# Patient Record
Sex: FEMALE | NOT HISPANIC OR LATINO | Employment: OTHER | URBAN - METROPOLITAN AREA
[De-identification: names, ages, dates, MRNs, and addresses within clinical notes are randomized per-mention and may not be internally consistent; named-entity substitution may affect disease eponyms.]

---

## 2021-02-13 ENCOUNTER — IMMUNIZATION (OUTPATIENT)
Dept: VACCINE CLINIC | Facility: HOSPITAL | Age: 81
End: 2021-02-13

## 2021-02-13 PROCEDURE — 91300 HC SARSCOV02 VAC 30MCG/0.3ML IM: CPT | Performed by: INTERNAL MEDICINE

## 2021-02-13 PROCEDURE — 0001A: CPT | Performed by: INTERNAL MEDICINE

## 2021-03-06 ENCOUNTER — IMMUNIZATION (OUTPATIENT)
Dept: VACCINE CLINIC | Facility: HOSPITAL | Age: 81
End: 2021-03-06

## 2021-03-06 PROCEDURE — 0002A: CPT | Performed by: INTERNAL MEDICINE

## 2021-03-06 PROCEDURE — 91300 HC SARSCOV02 VAC 30MCG/0.3ML IM: CPT | Performed by: INTERNAL MEDICINE

## 2023-10-05 ENCOUNTER — OFFICE VISIT (OUTPATIENT)
Dept: NEUROSURGERY | Facility: CLINIC | Age: 83
End: 2023-10-05
Payer: MEDICARE

## 2023-10-05 VITALS
TEMPERATURE: 96.6 F | HEIGHT: 61 IN | DIASTOLIC BLOOD PRESSURE: 82 MMHG | OXYGEN SATURATION: 95 % | WEIGHT: 164 LBS | SYSTOLIC BLOOD PRESSURE: 135 MMHG | BODY MASS INDEX: 30.96 KG/M2 | HEART RATE: 64 BPM

## 2023-10-05 DIAGNOSIS — M48.062 SPINAL STENOSIS OF LUMBAR REGION WITH NEUROGENIC CLAUDICATION: Primary | ICD-10-CM

## 2023-10-05 DIAGNOSIS — M43.16 SPONDYLOLISTHESIS OF LUMBAR REGION: ICD-10-CM

## 2023-10-05 PROBLEM — M48.061 LUMBAR SPINAL STENOSIS: Status: ACTIVE | Noted: 2023-10-05

## 2023-10-05 PROCEDURE — 1160F RVW MEDS BY RX/DR IN RCRD: CPT | Performed by: NEUROLOGICAL SURGERY

## 2023-10-05 PROCEDURE — 99204 OFFICE O/P NEW MOD 45 MIN: CPT | Performed by: NEUROLOGICAL SURGERY

## 2023-10-05 PROCEDURE — 1159F MED LIST DOCD IN RCRD: CPT | Performed by: NEUROLOGICAL SURGERY

## 2023-10-05 RX ORDER — ACETAMINOPHEN 500 MG
500 TABLET ORAL
COMMUNITY

## 2023-10-05 RX ORDER — FOLIC ACID 1 MG/1
TABLET ORAL
COMMUNITY

## 2023-10-05 RX ORDER — SIMVASTATIN 40 MG
1 TABLET ORAL DAILY
COMMUNITY

## 2023-10-05 RX ORDER — ALPRAZOLAM 0.5 MG/1
TABLET ORAL
COMMUNITY

## 2023-10-05 RX ORDER — ESCITALOPRAM OXALATE 10 MG/1
1 TABLET ORAL DAILY
COMMUNITY

## 2023-10-05 RX ORDER — METOPROLOL TARTRATE AND HYDROCHLOROTHIAZIDE 100; 25 MG/1; MG/1
1 TABLET ORAL DAILY
COMMUNITY

## 2023-10-05 RX ORDER — ASPIRIN 81 MG/1
TABLET ORAL
COMMUNITY

## 2023-10-05 RX ORDER — AMOXICILLIN 500 MG
CAPSULE ORAL
COMMUNITY

## 2023-10-05 NOTE — PROGRESS NOTES
"Subjective   Patient ID: Kristyn Lugo is a 83 y.o. female is being seen for consultation today at the request of Sean Fisher MD for spinal stenosis lumbar region. Patient had a MRI L-spine on 08/24/2023 @ [facility]    Treatment: No recent treatment    Today patient states that she has low back pain that radiates to B/L legs, along with numbness in B/L Feet. Patient states that she has issues issues with standing    History of Present Illness    This patient has been having pain in her back with radiation down her legs and numbness and tingling particularly in her feet for about 5 years now.  The problem has been getting steadily worse.  She was told 5 years ago that she would be a candidate for surgery but did not have time for it at that time.  Subsequent to that she has gotten some numbness and tingling particularly in her feet.  She has no difficulty with bowel bladder control but she does have difficulty standing and walking.    The following portions of the patient's history were reviewed and updated as appropriate: allergies, current medications, past family history, past medical history, past social history, past surgical history, and problem list.    Review of Systems   Constitutional:  Negative for chills and fever.   HENT:  Negative for congestion.    Genitourinary:  Negative for difficulty urinating and dysuria.   Musculoskeletal:  Positive for back pain, gait problem and myalgias.   Neurological:  Positive for numbness. Negative for weakness.     I reviewed the review of systems listed by the patient and discussed by my MA    Objective     Vitals:    10/05/23 1505   BP: 135/82   Cuff Size: Adult   Pulse: 64   Temp: 96.6 °F (35.9 °C)   SpO2: 95%   Weight: 74.4 kg (164 lb)   Height: 154.9 cm (61\")     Body mass index is 30.99 kg/m².    Tobacco Use: Medium Risk    Smoking Tobacco Use: Former    Smokeless Tobacco Use: Never    Passive Exposure: Not on file          Physical " Exam  Constitutional:       Appearance: She is well-developed.   HENT:      Head: Normocephalic and atraumatic.   Eyes:      Extraocular Movements: EOM normal.      Conjunctiva/sclera: Conjunctivae normal.      Pupils: Pupils are equal, round, and reactive to light.   Neck:      Vascular: No carotid bruit.   Neurological:      Mental Status: She is oriented to person, place, and time.      Coordination: Finger-Nose-Finger Test and Heel to Shin Test normal.      Gait: Gait is intact.      Deep Tendon Reflexes:      Reflex Scores:       Tricep reflexes are 2+ on the right side and 2+ on the left side.       Bicep reflexes are 2+ on the right side and 2+ on the left side.       Brachioradialis reflexes are 2+ on the right side and 2+ on the left side.       Patellar reflexes are 2+ on the right side and 2+ on the left side.       Achilles reflexes are 2+ on the right side and 2+ on the left side.  Psychiatric:         Speech: Speech normal.     Neurologic Exam     Mental Status   Oriented to person, place, and time.   Registration of memory: Good recent and remote memory.   Attention: normal. Concentration: normal.   Speech: speech is normal   Level of consciousness: alert  Knowledge: consistent with education.     Cranial Nerves     CN II   Visual fields full to confrontation.   Visual acuity: normal    CN III, IV, VI   Pupils are equal, round, and reactive to light.  Extraocular motions are normal.     CN V   Facial sensation intact.   Right corneal reflex: normal  Left corneal reflex: normal    CN VII   Facial expression full, symmetric.   Right facial weakness: none  Left facial weakness: none    CN VIII   Hearing: intact    CN IX, X   Palate: symmetric    CN XI   Right sternocleidomastoid strength: normal  Left sternocleidomastoid strength: normal    CN XII   Tongue: not atrophic  Tongue deviation: none    Motor Exam   Muscle bulk: normal  Right arm tone: normal  Left arm tone: normal  Right leg tone: normal  Left  leg tone: normal    Strength   Strength 5/5 except as noted.     Sensory Exam   Light touch normal.     Gait, Coordination, and Reflexes     Gait  Gait: normal    Coordination   Finger to nose coordination: normal  Heel to shin coordination: normal    Reflexes   Right brachioradialis: 2+  Left brachioradialis: 2+  Right biceps: 2+  Left biceps: 2+  Right triceps: 2+  Left triceps: 2+  Right patellar: 2+  Left patellar: 2+  Right achilles: 2+  Left achilles: 2+  Right : 2+  Left : 2+        Assessment & Plan   Independent Review of Radiographic Studies:      I personally reviewed the images from the following studies.    I reviewed an MRI of the lumbar spine done in August of this year.  This shows a widely patent canal and neuroforamina at L5-S1.  There is a grade 2 spondylolisthesis at L4-5 with severe stenosis.  L3-4 is fairly open with just a little narrowing centrally.  L2-3 shows some right lateral recess stenosis and foraminal stenosis and some central narrowing but not severe at L1-2 is widely open and the conus ends above the level.    Medical Decision Making:      I told the patient and her daughter that any surgery would be quite a major surgery and she is in her mid 80s and morbidly obese.  Consequently she is not a great candidate for surgery.  I recommended that we try an epidural block just to see if that helps calm things down.  I will have her call the office once that has been completed.    Diagnoses and all orders for this visit:    1. Spinal stenosis of lumbar region with neurogenic claudication (Primary)  -     Epidural Block    2. Spondylolisthesis of lumbar region      Return for Recheck and call after treatment or consultation.

## 2023-10-16 ENCOUNTER — HOSPITAL ENCOUNTER (OUTPATIENT)
Dept: PAIN MEDICINE | Facility: HOSPITAL | Age: 83
Discharge: HOME | End: 2023-10-16
Payer: MEDICARE

## 2023-10-16 ENCOUNTER — HOSPITAL ENCOUNTER (OUTPATIENT)
Dept: GENERAL RADIOLOGY | Facility: HOSPITAL | Age: 83
Discharge: HOME | End: 2023-10-16
Payer: MEDICARE

## 2023-10-16 ENCOUNTER — ANESTHESIA EVENT (OUTPATIENT)
Dept: PAIN MEDICINE | Facility: HOSPITAL | Age: 83
End: 2023-10-16
Payer: MEDICARE

## 2023-10-16 ENCOUNTER — ANESTHESIA (OUTPATIENT)
Dept: PAIN MEDICINE | Facility: HOSPITAL | Age: 83
End: 2023-10-16
Payer: MEDICARE

## 2023-10-16 VITALS
RESPIRATION RATE: 16 BRPM | SYSTOLIC BLOOD PRESSURE: 139 MMHG | WEIGHT: 165 LBS | HEIGHT: 61 IN | OXYGEN SATURATION: 95 % | DIASTOLIC BLOOD PRESSURE: 70 MMHG | BODY MASS INDEX: 31.15 KG/M2 | HEART RATE: 60 BPM

## 2023-10-16 DIAGNOSIS — M48.062 SPINAL STENOSIS OF LUMBAR REGION WITH NEUROGENIC CLAUDICATION: Primary | ICD-10-CM

## 2023-10-16 DIAGNOSIS — R52 PAIN: ICD-10-CM

## 2023-10-16 PROCEDURE — 77003 FLUOROGUIDE FOR SPINE INJECT: CPT

## 2023-10-16 PROCEDURE — 25010000002 MIDAZOLAM PER 1 MG: Performed by: ANESTHESIOLOGY

## 2023-10-16 PROCEDURE — 25010000002 METHYLPREDNISOLONE PER 80 MG: Performed by: ANESTHESIOLOGY

## 2023-10-16 RX ORDER — IOPAMIDOL 408 MG/ML
10 INJECTION, SOLUTION INTRATHECAL
Status: DISCONTINUED | OUTPATIENT
Start: 2023-10-16 | End: 2023-10-17 | Stop reason: HOSPADM

## 2023-10-16 RX ORDER — METHYLPREDNISOLONE ACETATE 80 MG/ML
80 INJECTION, SUSPENSION INTRA-ARTICULAR; INTRALESIONAL; INTRAMUSCULAR; SOFT TISSUE ONCE
Status: COMPLETED | OUTPATIENT
Start: 2023-10-16 | End: 2023-10-16

## 2023-10-16 RX ORDER — LIDOCAINE HYDROCHLORIDE 10 MG/ML
1 INJECTION, SOLUTION INFILTRATION; PERINEURAL ONCE
Status: DISCONTINUED | OUTPATIENT
Start: 2023-10-16 | End: 2023-10-17 | Stop reason: HOSPADM

## 2023-10-16 RX ORDER — SODIUM CHLORIDE 0.9 % (FLUSH) 0.9 %
10 SYRINGE (ML) INJECTION AS NEEDED
Status: DISCONTINUED | OUTPATIENT
Start: 2023-10-16 | End: 2023-10-17 | Stop reason: HOSPADM

## 2023-10-16 RX ORDER — SODIUM CHLORIDE 9 MG/ML
40 INJECTION, SOLUTION INTRAVENOUS AS NEEDED
Status: DISCONTINUED | OUTPATIENT
Start: 2023-10-16 | End: 2023-10-17 | Stop reason: HOSPADM

## 2023-10-16 RX ORDER — SODIUM CHLORIDE 0.9 % (FLUSH) 0.9 %
10 SYRINGE (ML) INJECTION EVERY 12 HOURS SCHEDULED
Status: DISCONTINUED | OUTPATIENT
Start: 2023-10-16 | End: 2023-10-17 | Stop reason: HOSPADM

## 2023-10-16 RX ORDER — MIDAZOLAM HYDROCHLORIDE 1 MG/ML
1 INJECTION INTRAMUSCULAR; INTRAVENOUS ONCE AS NEEDED
Status: COMPLETED | OUTPATIENT
Start: 2023-10-16 | End: 2023-10-16

## 2023-10-16 RX ORDER — FENTANYL CITRATE 50 UG/ML
50 INJECTION, SOLUTION INTRAMUSCULAR; INTRAVENOUS ONCE
Status: DISCONTINUED | OUTPATIENT
Start: 2023-10-16 | End: 2023-10-17 | Stop reason: HOSPADM

## 2023-10-16 RX ADMIN — MIDAZOLAM 1 MG: 1 INJECTION INTRAMUSCULAR; INTRAVENOUS at 10:03

## 2023-10-16 RX ADMIN — METHYLPREDNISOLONE ACETATE 80 MG: 80 INJECTION, SUSPENSION INTRA-ARTICULAR; INTRALESIONAL; INTRAMUSCULAR; SOFT TISSUE at 10:10

## 2023-10-16 NOTE — ANESTHESIA PROCEDURE NOTES
PAIN Epidural block    Pre-sedation assessment completed: 10/16/2023 9:49 AM    Patient reassessed immediately prior to procedure    Patient location during procedure: pain clinic  Start Time: 10/16/2023 9:49 AM  Stop Time: 10/16/2023 10:15 AM  Indication:procedure for pain  Performed By  Anesthesiologist: Ria Augustine MD  Preanesthetic Checklist  Completed: patient identified, IV checked, site marked, risks and benefits discussed, surgical consent, monitors and equipment checked, pre-op evaluation and timeout performed  Additional Notes  Fluoro used.    Prep:  Pt Position:prone  Sterile Tech:cap, gloves, mask and sterile barrier  Prep:chlorhexidine gluconate and isopropyl alcohol  Monitoring:blood pressure monitoring, continuous pulse oximetry and EKG  Procedure:Sedation: yes     Approach:midline  Guidance: fluoroscopy  Location:lumbar  Level:L5-S1  Needle Type:Tuohy  Needle Gauge:20  Aspiration:negative  Medications:  Preservative Free Saline:3mL  Comments:No dye d/t CRI  Dx: lumbar spinal stenosis w/ neurogenic claudication  Attempted access via LPM approach @ L4/5 w/o success  Sedation times: 1003-1015Depomedrol:80  Post Assessment:  Dressing:occlusive dressing applied  Pt Tolerance:patient tolerated the procedure well with no apparent complications  Complications:no

## 2023-10-16 NOTE — DISCHARGE INSTRUCTIONS

## 2023-10-16 NOTE — H&P
"  [facility]    History and Physical    Patient Name: Kristyn Lugo  :  1940  MRN:  [id number]  Date of Admission: 10/16/2023    Subjective     Patient is a 83 y.o. female presents with chief complaint of chronic, moderate low back, foot: bilateral, and leg: bilateral pain.  + numbness/tingling b/l feet.  Onset of symptoms was gradual starting several years ago.  Symptoms are associated/aggravated by nothing in particular or activity. Symptoms improve with nothing.  Ms. Lugo has been seen by Dr. Barr & referred for LESI which she presents for today.    Per Dr. Barr's note:   \"I reviewed an MRI of the lumbar spine done in August of this year.  This shows a widely patent canal and neuroforamina at L5-S1.  There is a grade 2 spondylolisthesis at L4-5 with severe stenosis.  L3-4 is fairly open with just a little narrowing centrally.  L2-3 shows some right lateral recess stenosis and foraminal stenosis and some central narrowing but not severe at L1-2 is widely open and the conus ends above the level.\"     The following portions of the patients history were reviewed and updated as appropriate: current medications, allergies, past medical history, past surgical history, past family history, past social history, and problem list                Objective     Past Medical History:   Past Medical History:   Diagnosis Date   • Aortic valve regurgitation    • Chronic kidney disease    • Low back pain      Past Surgical History: History reviewed. No pertinent surgical history.  Family History: History reviewed. No pertinent family history.  Social History:   Social History     Socioeconomic History   • Marital status:    Tobacco Use   • Smoking status: Former     Types: Cigarettes   • Smokeless tobacco: Never   Substance and Sexual Activity   • Drug use: Never   • Sexual activity: Defer       Vital Signs Range for the last 24 hours  Temperature:     Temp Source:     BP: BP: (180)/(85) 180/85 " "  Pulse: Heart Rate:  [61] 61   Respirations: Resp:  [16] 16   SPO2: SpO2:  [97 %] 97 %   O2 Amount (l/min):     O2 Devices Device (Oxygen Therapy): room air   Weight: Weight:  [74.8 kg (165 lb)] 74.8 kg (165 lb)     Flowsheet Rows      Flowsheet Row First Filed Value   Admission Height 154.9 cm (61\") Documented at 10/16/2023 0945   Admission Weight 74.8 kg (165 lb) Documented at 10/16/2023 0945            --------------------------------------------------------------------------------    Current Outpatient Medications   Medication Sig Dispense Refill   • aspirin 81 MG EC tablet aspirin 81 mg tablet,delayed release   Take 1 tablet every day by oral route.     • Omega-3 Fatty Acids (fish oil) 1200 MG capsule capsule Fish Oil     • acetaminophen (TYLENOL) 500 MG tablet Take 1 tablet by mouth.     • ALPRAZolam (XANAX) 0.5 MG tablet Xanax 0.5 mg tablet   1- 2 tabs po 30 minutes before procedure     • escitalopram (LEXAPRO) 10 MG tablet Take 1 tablet by mouth Daily.     • folic acid (FOLVITE) 1 MG tablet Take 1 tablet every day by oral route.     • metoprolol-hydrochlorothiazide (LOPRESSOR HCT) 100-25 MG per tablet Take 1 tablet by mouth Daily.     • simvastatin (ZOCOR) 40 MG tablet Take 1 tablet by mouth Daily.       Current Facility-Administered Medications   Medication Dose Route Frequency Provider Last Rate Last Admin   • fentaNYL citrate (PF) (SUBLIMAZE) injection 50 mcg  50 mcg Intravenous Once Ria Augustine MD       • iopamidol (ISOVUE-M 200) injection 41%  10 mL Epidural Once in imaging Ria Augustine MD       • lidocaine (XYLOCAINE) 1 % injection 1 mL  1 mL Intradermal Once Ria Augustine MD       • methylPREDNISolone acetate (DEPO-medrol) injection 80 mg  80 mg Epidural Once Ria Augustine MD       • midazolam (VERSED) injection 1 mg  1 mg Intravenous Once PRN Protzer, Ria Curiel MD     "       --------------------------------------------------------------------------------  Assessment & Plan      Anesthesia Evaluation     Patient summary reviewed and Nursing notes reviewed   no history of anesthetic complications:                Airway   Mallampati: II  Dental      Pulmonary - negative pulmonary ROS   Cardiovascular     (+) valvular problems/murmurs AI      Neuro/Psych- negative ROS  GI/Hepatic/Renal/Endo    (+) renal disease CRI    Musculoskeletal     (+) back pain, chronic pain, radiculopathy Right lower extremity and Left lower extremity  Abdominal    Substance History - negative use     OB/GYN negative ob/gyn ROS         Other               Diagnosis and Plan    Treatment Plan  ASA 3      Procedures: Lumbar Epidural Steroid Injection(LESI), With fluoroscopy,      Anesthetic plan and risks discussed with patient.      Diagnosis     * Spondylolisthesis, lumbar region [M43.16]     * Lumbar radiculopathy [M54.16]     * Spinal stenosis of lumbar region with neurogenic claudication [M48.062]

## 2023-10-30 ENCOUNTER — TELEPHONE (OUTPATIENT)
Dept: NEUROSURGERY | Facility: CLINIC | Age: 83
End: 2023-10-30
Payer: MEDICARE

## 2023-10-30 NOTE — TELEPHONE ENCOUNTER
"  Caller: Kristyn Lugo    Relationship: Self    Best call back number: [phone]    What is the best time to reach you: anytime before end of day Wednesday; out for [employer] 12-1p daily; ok to lvm @ home #.     Who are you requesting to speak with (clinical staff, provider,  specific staff member): DR BERNARD CLINICAL/ANY CLINICAL     What was the call regarding: NEW ALLERGY ADDED TO PATIENT CHART W/ NOTE 'PATIENT UNAWARE' AND PATIENT READ THE SUBSTANCE TO ME, \"fenofibrate?\"     ADDED TO ALLERGIES BY P.M. ANESTHESIOLOGY INJECTION PROVIDER: \"FenofibrateNot SpecifiedUnknown - High SeverityPt is unaware \"    PATIENT IS NEEDING TO KNOW WHAT THIS ALLERGY COULD POSSIBLY MEAN OR WHAT IT EVEN IS. IF SHE NEEDS TO BE AWARE OF IT FOR FUTURE, WHY IT WAS ADDED? SHE THOUGHT EVERYTHING WENT 'NORMALLY.'     I ATTEMPTED TO CONNECT PATIENT W/ HOSPITAL PAIN MGMT, WHICH STATED UNABLE TO CONNECT TO UNLESS SHE'S ESTABLISHED W/ PAIN MEDICINE SPECIALTY; SO REQUESTING C/B FROM CLINICAL RE NEW, UNDISCLOSED ALLERGY ID'D AT INJECTION.     Is it okay if the provider responds through [employer]: NO - 'DON'T USE COMPUTERS OR SMARTPHONE LIKE THAT.'  "

## 2023-10-30 NOTE — TELEPHONE ENCOUNTER
LM for patient explaining that our office did not add that allergy to her chart, it was added on 07/27/2022 and on that date it looks like she was seen T Dr. Walsh's office and she may want to call their office and fine out why it was placed in her chart

## 2023-11-22 ENCOUNTER — OFFICE VISIT (OUTPATIENT)
Dept: NEUROSURGERY | Facility: CLINIC | Age: 83
End: 2023-11-22
Payer: MEDICARE

## 2023-11-22 DIAGNOSIS — M48.062 SPINAL STENOSIS OF LUMBAR REGION WITH NEUROGENIC CLAUDICATION: Primary | ICD-10-CM

## 2023-11-22 DIAGNOSIS — M43.16 SPONDYLOLISTHESIS OF LUMBAR REGION: ICD-10-CM

## 2023-11-22 PROCEDURE — 1160F RVW MEDS BY RX/DR IN RCRD: CPT | Performed by: NEUROLOGICAL SURGERY

## 2023-11-22 PROCEDURE — 99213 OFFICE O/P EST LOW 20 MIN: CPT | Performed by: NEUROLOGICAL SURGERY

## 2023-11-22 PROCEDURE — 1159F MED LIST DOCD IN RCRD: CPT | Performed by: NEUROLOGICAL SURGERY

## 2023-11-22 NOTE — PROGRESS NOTES
Subjective   Patient ID: Kristyn Lugo is a 83 y.o. female is here today for follow-up post COCO.    Today patient states that she had an COCO October 2023, but she is having continuous numbness in her feet, she is also mild low back pain. Patient also has L leg pain     History of Present Illness    This patient had an epidural block on October 16.  This did help her back pain some.  It also got rid of the pain in her leg but then the pain in the left leg came back.  She still has numbness in her feet.  She has no other particular complaints at this point.  Does feel that the pain is tolerable as it is now.    The following portions of the patient's history were reviewed and updated as appropriate: allergies, current medications, past family history, past medical history, past social history, past surgical history, and problem list.    Review of Systems   Constitutional:  Negative for chills and fever.   HENT:  Negative for congestion.    Musculoskeletal:  Positive for back pain, gait problem and myalgias.   Neurological:  Positive for weakness and numbness.       I reviewed the review of systems listed by the patient and discussed by my MA    Objective     There were no vitals filed for this visit.  There is no height or weight on file to calculate BMI.    Tobacco Use: Medium Risk (11/22/2023)    Patient History     Smoking Tobacco Use: Former     Smokeless Tobacco Use: Never     Passive Exposure: Not on file          Physical Exam  Neurological:      Mental Status: She is alert and oriented to person, place, and time.       Neurologic Exam     Mental Status   Oriented to person, place, and time.           Assessment & Plan   Independent Review of Radiographic Studies:      I personally reviewed the images from the following studies.    And reviewed her MRI from August.  This does show severe stenosis at L4-5.  The other levels are relatively open but somewhat narrowed in a couple of spots.    Medical  Decision Making:      I told the patient I would leave this alone at this point.  There is nothing dangerous on the MRI and at 84 years old her chances of having a complication from surgery are far greater than her chances of having a complication from not doing surgery.  She agrees and will call if anything gets worse.    Diagnoses and all orders for this visit:    1. Spinal stenosis of lumbar region with neurogenic claudication (Primary)    2. Spondylolisthesis of lumbar region      Return if symptoms worsen or fail to improve.

## 2024-01-22 ENCOUNTER — ANESTHESIA EVENT (OUTPATIENT)
Dept: PAIN MEDICINE | Facility: HOSPITAL | Age: 84
End: 2024-01-22

## 2024-01-23 ENCOUNTER — ANESTHESIA (OUTPATIENT)
Dept: PAIN MEDICINE | Facility: HOSPITAL | Age: 84
End: 2024-01-23

## 2024-01-25 ENCOUNTER — TELEPHONE (OUTPATIENT)
Dept: NEUROSURGERY | Facility: CLINIC | Age: 84
End: 2024-01-25
Payer: MEDICARE

## 2024-01-25 NOTE — TELEPHONE ENCOUNTER
Caller: Kristyn Lugo    Relationship: Self    Best call back number: [phone]    Additional information or concerns: PATIENT RECEIVED A LETTER DATED 1/17/24 FROM [employer] ON BEHALF OF MEDICARE, REGARDING AUTHORIZATION FOR PREVIOUS EPIDURAL INJECTION ON 10/16/23 @ [facility]. SHE DID NOT UNDERGO 2ND INJECTION ON 1/23/24, AS SHE IS CONCERNED IT WILL NOT BE COVERED BY INSURANCE.    PER PATIENT, THE LETTER STATES IT IS A COPY OF AN APPEAL DECISION LETTER PROVIDED TO  (DATE OF APPEAL 12/15/23). SHE HAS SPOKEN WITH THE PAIN CLINIC AND BILLING OFFICE, BUT THEY ARE UNABLE TO GIVE HER ANY ANSWERS ABOUT WHY SHE RECEIVED THIS LETTER, OR WHAT TO DO MOVING FORWARD (LETTER STATES NO ACTION IS NEEDED ON HER PART, BUT PATIENT IS WORRIED FUTURE INJECTIONS WILL NOT BE COVERED).    I ADVISED BASED ON THE TEXT OF THE LETTER THAT THIS SOUNDS LIKE AN ISSUE BETWEEN  AND MEDICARE, BUT THAT I CANNOT BE CERTAIN. PLEASE CALL PATIENT TO ADVISE ON POSSIBLE NEXT STEPS.

## 2024-01-31 NOTE — TELEPHONE ENCOUNTER
Called and spoke with patient regarding situation. Patient stated she received a sedation at last minute due to not truly understanding what they would be doing to her for procedure. This is what was not authorized for patient. I explained to patient I sent email to our Revenue Integrity Department to appeal. She understood and thankful for help. I told her once I hear back I will contact her to let her know what was going on with   outcome.

## 2024-02-01 NOTE — TELEPHONE ENCOUNTER
Called patient to give update on situation. Spoke to Appeals this morning (Tammy Escalante). She stated first appeal denied now sent to nurse review and will be sending out a second appeal on behalf of patient and hospital. Tammy explained if bill does get accepted portion will be 80/20 with 20% being billed to patient's secondary medicare supplement [place].I explained to patient that we will be waiting sometime for second appeal due to many that are in front of her appeal. At this time patient does not have a bill due to this being in the appeal process. Patient understood and will be setting up second injection without sedation.

## 2024-02-12 ENCOUNTER — ANESTHESIA EVENT (OUTPATIENT)
Dept: PAIN MEDICINE | Facility: HOSPITAL | Age: 84
End: 2024-02-12
Payer: MEDICARE

## 2024-02-12 ENCOUNTER — ANESTHESIA (OUTPATIENT)
Dept: PAIN MEDICINE | Facility: HOSPITAL | Age: 84
End: 2024-02-12
Payer: MEDICARE

## 2024-02-12 ENCOUNTER — HOSPITAL ENCOUNTER (OUTPATIENT)
Dept: GENERAL RADIOLOGY | Facility: HOSPITAL | Age: 84
Discharge: HOME | End: 2024-02-12
Payer: MEDICARE

## 2024-02-12 ENCOUNTER — HOSPITAL ENCOUNTER (OUTPATIENT)
Dept: PAIN MEDICINE | Facility: HOSPITAL | Age: 84
Discharge: HOME | End: 2024-02-12
Payer: MEDICARE

## 2024-02-12 VITALS
HEART RATE: 64 BPM | SYSTOLIC BLOOD PRESSURE: 202 MMHG | OXYGEN SATURATION: 96 % | RESPIRATION RATE: 16 BRPM | DIASTOLIC BLOOD PRESSURE: 93 MMHG

## 2024-02-12 DIAGNOSIS — R52 PAIN: ICD-10-CM

## 2024-02-12 DIAGNOSIS — M43.16 SPONDYLOLISTHESIS OF LUMBAR REGION: Primary | ICD-10-CM

## 2024-02-12 DIAGNOSIS — M48.062 SPINAL STENOSIS OF LUMBAR REGION WITH NEUROGENIC CLAUDICATION: ICD-10-CM

## 2024-02-12 PROCEDURE — 25010000002 METHYLPREDNISOLONE PER 80 MG: Performed by: STUDENT IN AN ORGANIZED HEALTH CARE EDUCATION/TRAINING PROGRAM

## 2024-02-12 PROCEDURE — 77003 FLUOROGUIDE FOR SPINE INJECT: CPT

## 2024-02-12 RX ORDER — METHYLPREDNISOLONE ACETATE 80 MG/ML
80 INJECTION, SUSPENSION INTRA-ARTICULAR; INTRALESIONAL; INTRAMUSCULAR; SOFT TISSUE ONCE
Status: COMPLETED | OUTPATIENT
Start: 2024-02-12 | End: 2024-02-12

## 2024-02-12 RX ORDER — IOPAMIDOL 408 MG/ML
10 INJECTION, SOLUTION INTRATHECAL
Status: DISCONTINUED | OUTPATIENT
Start: 2024-02-12 | End: 2024-02-13 | Stop reason: HOSPADM

## 2024-02-12 RX ORDER — LIDOCAINE HYDROCHLORIDE 10 MG/ML
1 INJECTION, SOLUTION INFILTRATION; PERINEURAL ONCE
Status: DISCONTINUED | OUTPATIENT
Start: 2024-02-12 | End: 2024-02-13 | Stop reason: HOSPADM

## 2024-02-12 RX ADMIN — METHYLPREDNISOLONE ACETATE 80 MG: 80 INJECTION, SUSPENSION INTRA-ARTICULAR; INTRALESIONAL; INTRAMUSCULAR; SOFT TISSUE at 15:53

## 2024-05-15 ENCOUNTER — TELEPHONE (OUTPATIENT)
Dept: NEUROSURGERY | Facility: CLINIC | Age: 84
End: 2024-05-15
Payer: MEDICARE

## 2024-05-15 NOTE — TELEPHONE ENCOUNTER
LM for patient informing that since her injection is already scheduled, more than likely its already been approved through medicare and  pain management already has the order    HUB ok to give message

## 2024-05-15 NOTE — TELEPHONE ENCOUNTER
Provider: DR BERNARD    Caller: ESTRELLA COATES      Phone Number: [phone]    Reason for Call: PT IS CALLING ABOUT HER EPIDURAL INJECTION SCHEDULED FOR 6-4-24.  SHE IS WONDERING WHO NEEDED TO ORDER THAT.  SHE STATES THERE IS A LOT OF CONFUSION ABOUT THESE INJECTIONS.  I STATED I THINK THEY MUST HAVE THE ORDER IF THE BLOCK IS SCHEDULED.  SHE WANTS TO BE SURE EVERYTHING IS GOING TO BE PAID BY MEDICARE.    When was the patient last seen: 11-22-23    PLEASE CALL PATIENT TO DISCUSS.

## 2024-06-04 ENCOUNTER — ANESTHESIA EVENT (OUTPATIENT)
Dept: PAIN MEDICINE | Facility: HOSPITAL | Age: 84
End: 2024-06-04
Payer: MEDICARE

## 2024-06-04 ENCOUNTER — HOSPITAL ENCOUNTER (OUTPATIENT)
Dept: GENERAL RADIOLOGY | Facility: HOSPITAL | Age: 84
Discharge: HOME | End: 2024-06-04
Payer: MEDICARE

## 2024-06-04 ENCOUNTER — HOSPITAL ENCOUNTER (OUTPATIENT)
Dept: PAIN MEDICINE | Facility: HOSPITAL | Age: 84
Discharge: HOME | End: 2024-06-04
Payer: MEDICARE

## 2024-06-04 ENCOUNTER — ANESTHESIA (OUTPATIENT)
Dept: PAIN MEDICINE | Facility: HOSPITAL | Age: 84
End: 2024-06-04
Payer: MEDICARE

## 2024-06-04 VITALS
DIASTOLIC BLOOD PRESSURE: 85 MMHG | SYSTOLIC BLOOD PRESSURE: 159 MMHG | OXYGEN SATURATION: 93 % | RESPIRATION RATE: 14 BRPM | TEMPERATURE: 97.8 F | HEART RATE: 62 BPM

## 2024-06-04 DIAGNOSIS — R52 PAIN: ICD-10-CM

## 2024-06-04 DIAGNOSIS — M48.062 SPINAL STENOSIS OF LUMBAR REGION WITH NEUROGENIC CLAUDICATION: Primary | ICD-10-CM

## 2024-06-04 PROCEDURE — 25010000002 METHYLPREDNISOLONE PER 80 MG: Performed by: ANESTHESIOLOGY

## 2024-06-04 PROCEDURE — 77003 FLUOROGUIDE FOR SPINE INJECT: CPT

## 2024-06-04 RX ORDER — MIDAZOLAM HYDROCHLORIDE 1 MG/ML
1 INJECTION INTRAMUSCULAR; INTRAVENOUS
Status: DISCONTINUED | OUTPATIENT
Start: 2024-06-04 | End: 2024-06-05 | Stop reason: HOSPADM

## 2024-06-04 RX ORDER — FENTANYL CITRATE 50 UG/ML
50 INJECTION, SOLUTION INTRAMUSCULAR; INTRAVENOUS AS NEEDED
Status: DISCONTINUED | OUTPATIENT
Start: 2024-06-04 | End: 2024-06-05 | Stop reason: HOSPADM

## 2024-06-04 RX ORDER — LIDOCAINE HYDROCHLORIDE 10 MG/ML
1 INJECTION, SOLUTION INFILTRATION; PERINEURAL ONCE
Status: DISCONTINUED | OUTPATIENT
Start: 2024-06-04 | End: 2024-06-05 | Stop reason: HOSPADM

## 2024-06-04 RX ORDER — IOPAMIDOL 408 MG/ML
3 INJECTION, SOLUTION INTRATHECAL
Status: DISCONTINUED | OUTPATIENT
Start: 2024-06-04 | End: 2024-06-05 | Stop reason: HOSPADM

## 2024-06-04 RX ORDER — METHYLPREDNISOLONE ACETATE 80 MG/ML
80 INJECTION, SUSPENSION INTRA-ARTICULAR; INTRALESIONAL; INTRAMUSCULAR; SOFT TISSUE ONCE
Status: COMPLETED | OUTPATIENT
Start: 2024-06-04 | End: 2024-06-04

## 2024-06-04 RX ADMIN — METHYLPREDNISOLONE ACETATE 80 MG: 80 INJECTION, SUSPENSION INTRA-ARTICULAR; INTRALESIONAL; INTRAMUSCULAR; SOFT TISSUE at 09:21

## 2024-06-04 NOTE — ANESTHESIA PROCEDURE NOTES
PAIN Epidural block    Pre-sedation assessment completed: 6/4/2024 9:17 AM    Patient reassessed immediately prior to procedure    Patient location during procedure: pain clinic  Start Time: 6/4/2024 9:17 AM  Stop Time: 6/4/2024 9:25 AM  Indication:at surgeon's request and procedure for pain  Performed By  Anesthesiologist: Anish Ayala MD  Preanesthetic Checklist  Completed: patient identified, IV checked, site marked, risks and benefits discussed, surgical consent, monitors and equipment checked, pre-op evaluation and timeout performed  Additional Notes  Dx:  Post-Op Diagnosis Codes:     * Spinal stenosis of lumbar region with neurogenic claudication [M48.062]     * Spondylolisthesis of lumbar region [M43.16]  80 mg depomedrol in epid    Plan : return to clinic as needed  Prep:  Pt Position:prone (prone)  Sterile Tech:cap, gloves, mask and sterile barrier  Prep:chlorhexidine gluconate and isopropyl alcohol  Monitoring:blood pressure monitoring, EKG and continuous pulse oximetry  Procedure:Sedation: no     Approach:midline  Guidance: fluoroscopy and c arm pa and lat and loss of resistance  Location:lumbar  Level:L5-S1 (interlaminar)  Needle Type:[employer]  Needle Gauge:20  Aspiration:negative  Medications:  Preservative Free Saline:3mL  Comments:No dye - renal pathologyDepomedrol:80 mg  Post Assessment:  Pt Tolerance:patient tolerated the procedure well with no apparent complications  Complications:no

## 2024-06-04 NOTE — DISCHARGE INSTRUCTIONS

## 2024-06-04 NOTE — H&P
INTERVAL HISTORY:    The patient returns for another Lumbar epidural steroid injection 3 today.  They have received 55% improvement since their last injection with a pain level of 5/10 at its worst recently.    Conservative measures tried in the interim. Daily activities are still affected by the pain.    Radiology reports and/or previous notes have been reviewed and are consistent with their diagnosis of Post-Op Diagnosis Codes:     * Spinal stenosis of lumbar region with neurogenic claudication [M48.062]     * Spondylolisthesis of lumbar region [M43.16]    Alert and oriented  MP - 2  Lungs - clear  CV - rrr    Diagnosis:  Post-Op Diagnosis Codes:     * Spinal stenosis of lumbar region with neurogenic claudication [M48.062]     * Spondylolisthesis of lumbar region [M43.16]      Plan:  Lumbar epidural steroid injection under fluoroscopic guidance        Target : L5S1    I have encouraged them to continue:  1.  Physical therapy exercises at home as prescribed by physical therapy or from the pain clinic handout (given to the patient).  Continuation of these exercises every day, or multiple times per week, even when the patient has good pain relief, was stressed to the patient as a preventative measure to decrease the frequency and severity of future pain episodes.  2.  Continue pain medicines as already prescribed.  If patient not currently taking any, it is recommended to begin Acetaminophen 1000 mg po q 8 hours.  If other medicines containing Acetaminophen are currently prescribed, maintain daily dose at 3000mg.    3.  If they can tolerate NSAIDS, it is recommended to take Ibuprofen 600 mg po q 6 hours for 7 days during pain exacerbations.   Alternatively, they may substitute an NSAID of their choice (e.g. Aleve)  4.  Heat and ice to the affected area as tolerated for pain control.  It was discussed that heating pads can cause burns.  5.  Low impact exercise such as walking or water exercise was recommended to  maintain overall health and aid in weight control.   6.  Follow up as needed for subsequent injections.  7.  Patient was counseled to abstain from tobacco products.    Time :  21  min

## 2024-09-09 ENCOUNTER — ANESTHESIA EVENT (OUTPATIENT)
Dept: PAIN MEDICINE | Facility: HOSPITAL | Age: 84
End: 2024-09-09
Payer: MEDICARE

## 2024-09-09 ENCOUNTER — ANESTHESIA (OUTPATIENT)
Dept: PAIN MEDICINE | Facility: HOSPITAL | Age: 84
End: 2024-09-09
Payer: MEDICARE

## 2024-09-09 ENCOUNTER — HOSPITAL ENCOUNTER (OUTPATIENT)
Dept: GENERAL RADIOLOGY | Facility: HOSPITAL | Age: 84
Discharge: HOME | End: 2024-09-09
Payer: MEDICARE

## 2024-09-09 ENCOUNTER — HOSPITAL ENCOUNTER (OUTPATIENT)
Dept: PAIN MEDICINE | Facility: HOSPITAL | Age: 84
Discharge: HOME | End: 2024-09-09
Payer: MEDICARE

## 2024-09-09 VITALS
OXYGEN SATURATION: 94 % | HEART RATE: 58 BPM | SYSTOLIC BLOOD PRESSURE: 155 MMHG | RESPIRATION RATE: 16 BRPM | TEMPERATURE: 97 F | DIASTOLIC BLOOD PRESSURE: 88 MMHG

## 2024-09-09 DIAGNOSIS — M48.062 SPINAL STENOSIS OF LUMBAR REGION WITH NEUROGENIC CLAUDICATION: ICD-10-CM

## 2024-09-09 DIAGNOSIS — R52 PAIN: ICD-10-CM

## 2024-09-09 PROCEDURE — 77003 FLUOROGUIDE FOR SPINE INJECT: CPT

## 2024-09-09 PROCEDURE — 25010000002 METHYLPREDNISOLONE PER 80 MG: Performed by: ANESTHESIOLOGY

## 2024-09-09 RX ORDER — FENTANYL CITRATE 50 UG/ML
50 INJECTION, SOLUTION INTRAMUSCULAR; INTRAVENOUS ONCE
Status: DISCONTINUED | OUTPATIENT
Start: 2024-09-09 | End: 2024-09-10 | Stop reason: HOSPADM

## 2024-09-09 RX ORDER — METHYLPREDNISOLONE ACETATE 80 MG/ML
80 INJECTION, SUSPENSION INTRA-ARTICULAR; INTRALESIONAL; INTRAMUSCULAR; SOFT TISSUE ONCE
Status: COMPLETED | OUTPATIENT
Start: 2024-09-09 | End: 2024-09-09

## 2024-09-09 RX ORDER — LIDOCAINE HYDROCHLORIDE 10 MG/ML
1 INJECTION, SOLUTION INFILTRATION; PERINEURAL ONCE
Status: DISCONTINUED | OUTPATIENT
Start: 2024-09-09 | End: 2024-09-10 | Stop reason: HOSPADM

## 2024-09-09 RX ORDER — IOPAMIDOL 408 MG/ML
10 INJECTION, SOLUTION INTRATHECAL
Status: DISCONTINUED | OUTPATIENT
Start: 2024-09-09 | End: 2024-09-10 | Stop reason: HOSPADM

## 2024-09-09 RX ORDER — MIDAZOLAM HYDROCHLORIDE 1 MG/ML
1 INJECTION INTRAMUSCULAR; INTRAVENOUS ONCE AS NEEDED
Status: DISCONTINUED | OUTPATIENT
Start: 2024-09-09 | End: 2024-09-10 | Stop reason: HOSPADM

## 2024-09-09 RX ADMIN — METHYLPREDNISOLONE ACETATE 80 MG: 80 INJECTION, SUSPENSION INTRA-ARTICULAR; INTRALESIONAL; INTRAMUSCULAR; SOFT TISSUE at 09:34

## 2024-09-09 NOTE — H&P
[facility]    History and Physical    Patient Name: Kristyn Lugo  :  1940  MRN:  [id number]  Date of Admission: 2024    Subjective     Patient is a 84 y.o. female presents with chief complaint of chronic low back pain.  Onset of symptoms was gradual starting unknown years ago.  Symptoms are associated/aggravated by nothing in particular, activity, or standing. Symptoms improve with injection    The following portions of the patients history were reviewed and updated as appropriate: current medications, allergies, past medical history, past surgical history, past family history, past social history, and problem list    She reports about 80% relief of back pain from her last injection.  This lasted about 2 months.  She has low back pain that radiates into her upper buttocks.  She also reports a numbness sensation in her buttocks that radiates down into her legs.  She has saddle numbness.  She also says that she is slightly incontinent of urine but that is not new.  She does not have any obvious focal motor weakness.  She says she is a little bit unbalanced at times but that is also not a new symptom.    She has seen Dr. Barr in the past and was told any surgical intervention would likely be a fairly significant surgery which he would advise against.  She seems to be doing fairly well with epidural injections.  I did tell her that she should contact neurosurgery if she ever notices any obvious weakness or has difficulty ambulating.        Objective     Past Medical History:   Past Medical History:   Diagnosis Date    Aortic valve regurgitation     Chronic kidney disease     Low back pain      Past Surgical History: History reviewed. No pertinent surgical history.  Family History: History reviewed. No pertinent family history.  Social History:   Social History     Socioeconomic History    Marital status:    Tobacco Use    Smoking status: Former     Types: Cigarettes    Smokeless  tobacco: Never   Substance and Sexual Activity    Drug use: Never    Sexual activity: Defer       Vital Signs Range for the last 24 hours  Temperature: Temp:  [36.1 °C (97 °F)] 36.1 °C (97 °F)   Temp Source: Temp src: Temporal   BP: BP: (171)/(91) 171/91   Pulse: Heart Rate:  [59] 59   Respirations: Resp:  [16] 16   SPO2: SpO2:  [97 %] 97 %   O2 Amount (l/min):     O2 Devices Device (Oxygen Therapy): room air   Weight:           --------------------------------------------------------------------------------    Current Outpatient Medications   Medication Sig Dispense Refill    acetaminophen (TYLENOL) 500 MG tablet Take 1 tablet by mouth.      aspirin 81 MG EC tablet aspirin 81 mg tablet,delayed release   Take 1 tablet every day by oral route.      escitalopram (LEXAPRO) 10 MG tablet Take 1 tablet by mouth Daily.      folic acid (FOLVITE) 1 MG tablet Take 1 tablet every day by oral route.      metoprolol-hydrochlorothiazide (LOPRESSOR HCT) 100-25 MG per tablet Take 1 tablet by mouth Daily.      Omega-3 Fatty Acids (fish oil) 1200 MG capsule capsule Fish Oil      simvastatin (ZOCOR) 40 MG tablet Take 1 tablet by mouth Daily.       Current Facility-Administered Medications   Medication Dose Route Frequency Provider Last Rate Last Admin    fentaNYL citrate (PF) (SUBLIMAZE) injection 50 mcg  50 mcg Intravenous Once RenderAnmol MD        iopamidol (ISOVUE-M 200) injection 41%  10 mL Epidural Once in imaging Anmol Pierson MD        lidocaine (XYLOCAINE) 1 % injection 1 mL  1 mL Intradermal Once RenderAnmol MD        methylPREDNISolone acetate (DEPO-medrol) injection 80 mg  80 mg Epidural Once RenderAnmol MD        midazolam (VERSED) injection 1 mg  1 mg Intravenous Once PRN Anmol Pierson MD           --------------------------------------------------------------------------------  Assessment & Plan      Anesthesia Evaluation           Pain impairs ability to perform ADLs: Ambulation and  "Exercise/Activity  Modalities previously tried to control pain with limited effectiveness within the last 4-6 weeks: OTC medications     Airway   Mallampati: II  TM distance: >3 FB  Neck ROM: full  No difficulty expected  Dental      Pulmonary    (+) COPD,  (-) wheezes  Cardiovascular     Rhythm: regular    (+) valvular problems/murmurs AI  (-) murmur      Neuro/Psych  (-) normal sensory deficit, left straight leg raise test, right straight leg raise test    PE Comment: She reports mild numbness and tingling in her saddle area as well as her upper buttocks.  It is not insensate to touch however  GI/Hepatic/Renal/Endo    (+) renal disease-    Musculoskeletal         PE comment: After flexors, dorsiflexors, quadriceps and hamstring muscles AB and adductor's in her lower extremities are intact without any obvious focal motor weakness.  Abdominal    Substance History      OB/GYN          Other                   Diagnosis and Plan    Treatment Plan  ASA 2   Patient has had previous injection/procedure with % improvement.   Procedures: Lumbar Epidural Steroid Injection(LESI), With fluoroscopy,      Anesthetic plan and risks discussed with patient.      Discussed with patient risk and benefits of COCO including but not limited to : Bleeding, infection, PDPH, inadvertant spinal anesthetic, worsening pain, hyperglycemia, hypertension, CHF, nerve damage, steroid \"toxicity\" and AVN of hips.  Pt agrees to proceed.  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

## 2024-09-09 NOTE — DISCHARGE INSTRUCTIONS

## 2024-09-09 NOTE — ANESTHESIA PROCEDURE NOTES
PAIN Epidural block    Pre-sedation assessment completed: 9/9/2024 9:33 AM    Patient reassessed immediately prior to procedure    Start Time: 9/9/2024 9:34 AM  Stop Time: 9/9/2024 9:39 AM  Indication:at surgeon's request and procedure for pain  Performed By  Anesthesiologist: Anmol Pierson MD  Preanesthetic Checklist  Completed: patient identified, risks and benefits discussed, surgical consent, monitors and equipment checked, pre-op evaluation and timeout performed  Additional Notes  Post-Op Diagnosis Codes:     * Spinal stenosis of lumbar region without neurogenic claudication [M48.061]     * 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

## 2024-09-12 ENCOUNTER — TELEPHONE (OUTPATIENT)
Dept: NEUROSURGERY | Facility: CLINIC | Age: 84
End: 2024-09-12
Payer: MEDICARE

## 2024-09-12 NOTE — TELEPHONE ENCOUNTER
PATIENT CALLED IN AND STATES PAIN MANAGEMENT IS TELLING HER SHE CANNOT GET ANY MORE INJECTIONS UNTIL SHE SEES DR. BERNARD AGAIN.  SHE HAS HAD 4 INJECTIONS AND THEY SEEM TO HELP PER PATIENT.     PATIENT DOES NOT KNOW WHAT TO DO NEXT, LAST INJECTION LASTED 2 MONTHS AND STILL HAS TROUBLE WHEN WANTING TO GET UP OR BEND DOWN     PATIENT STATES SHE STARTED HAVING URINARY INCONTINENCE SINCE FALL OF 2023 AND IT HAS WORSENED      ATTEMPTED WT TO THE OFFICE AFTER SHE MENTIONED URINARY INCONTINENCE - PER QING SENDING T/E     PATIENT ALSO MENTIONED HAVING NUMBNESS AROUND HER BUTTOCKS AREA AND FEET     PLEASE CALL PATIENT WITH ANY ADVICE     [phone] (home)     THANK YOU!

## 2024-09-13 NOTE — TELEPHONE ENCOUNTER
LM for patient to call the office and schedule an appt with Dr. Barr, advise to ask for me to get scheduled sooner rather than later    HUB ok to give message

## 2024-09-13 NOTE — TELEPHONE ENCOUNTER
PATIENT RETURNED REBEKA'S CALL, TRIED TO W/T TO OFFICE, NO ANSWER.  PLEASE CALL PATIENT TO SCHEDULE.  SHE IS IN FOR THE REST OF THE DAY.

## 2024-10-07 NOTE — PROGRESS NOTES
Subjective   Patient ID: Kristyn Lugo is a 84 y.o. female is here today for follow-up post COCO x4.    Today patient states that she has mild bladder incontinence, along with B/L leg numbness and weakness   History of Present Illness    This patient was last here in November of last year.  She had had an epidural block in October and it got rid of the pain in her leg but then it came back.  Currently she has numbness in both of her legs as well as weakness.  She has had further epidural blocks most recent 1 in early September.  She says for the most part she is tolerating her pain.  She says that the epidural blocks helped for a good while although not the full 3 months.  She does control her pain fairly well with Advil and Tylenol.    The following portions of the patient's history were reviewed and updated as appropriate: allergies, current medications, past family history, past medical history, past social history, past surgical history, and problem list.    Review of Systems   Constitutional:  Negative for chills and fever.   HENT:  Negative for congestion.    Genitourinary:  Positive for urgency. Negative for difficulty urinating and dysuria.   Musculoskeletal:  Negative for back pain.   Neurological:  Positive for weakness and numbness.        B/L leg weakness       I reviewed the review of systems listed by the patient and discussed by my MA    Objective     There were no vitals filed for this visit.  There is no height or weight on file to calculate BMI.    Tobacco Use: Medium Risk (10/8/2024)    Patient History     Smoking Tobacco Use: Former     Smokeless Tobacco Use: Never     Passive Exposure: Not on file          Physical Exam  Eyes:      Extraocular Movements: EOM normal.      Pupils: Pupils are equal, round, and reactive to light.   Neurological:      Mental Status: She is alert and oriented to person, place, and time.      Coordination: Finger-Nose-Finger Test and Heel to Shin Test normal.       Gait: Gait is intact.      Deep Tendon Reflexes:      Reflex Scores:       Tricep reflexes are 2+ on the right side and 2+ on the left side.       Bicep reflexes are 2+ on the right side and 2+ on the left side.       Brachioradialis reflexes are 2+ on the right side and 2+ on the left side.       Patellar reflexes are 2+ on the right side and 2+ on the left side.       Achilles reflexes are 2+ on the right side and 2+ on the left side.  Psychiatric:         Speech: Speech normal.       Neurologic Exam     Mental Status   Oriented to person, place, and time.   Registration of memory: Good recent and remote memory.   Attention: normal. Concentration: normal.   Speech: speech is normal   Level of consciousness: alert  Knowledge: consistent with education.     Cranial Nerves     CN II   Visual fields full to confrontation.   Visual acuity: normal    CN III, IV, VI   Pupils are equal, round, and reactive to light.  Extraocular motions are normal.     CN V   Facial sensation intact.   Right corneal reflex: normal  Left corneal reflex: normal    CN VII   Facial expression full, symmetric.   Right facial weakness: none  Left facial weakness: none    CN VIII   Hearing: intact    CN IX, X   Palate: symmetric    CN XI   Right sternocleidomastoid strength: normal  Left sternocleidomastoid strength: normal    CN XII   Tongue: not atrophic  Tongue deviation: none    Motor Exam   Muscle bulk: normal  Right arm tone: normal  Left arm tone: normal  Right leg tone: normal  Left leg tone: normal    Strength   Strength 5/5 except as noted.     Sensory Exam   Light touch normal.     Gait, Coordination, and Reflexes     Gait  Gait: normal    Coordination   Finger to nose coordination: normal  Heel to shin coordination: normal    Reflexes   Right brachioradialis: 2+  Left brachioradialis: 2+  Right biceps: 2+  Left biceps: 2+  Right triceps: 2+  Left triceps: 2+  Right patellar: 2+  Left patellar: 2+  Right achilles: 2+  Left  achilles: 2+  Right : 2+  Left : 2+          Assessment & Plan   Independent Review of Radiographic Studies:      I personally reviewed the images from the following studies.    I reviewed an MRI of her lumbar spine from August of last year.  This shows severe stenosis at L4-5.  The other levels are mostly open.    Medical Decision Making:      I told the patient and her daughter that we could certainly consider a myelogram and a  minimally invasive laminectomy but since she is able to control everything pretty well with just nonnarcotic medications I think the best bet here is just to continue on with the injections.  I recommended trying a new round of epidural blocks.    Diagnoses and all orders for this visit:    1. Spinal stenosis of lumbar region with neurogenic claudication (Primary)  -     Epidural Block      Return if symptoms worsen or fail to improve.

## 2024-10-08 ENCOUNTER — OFFICE VISIT (OUTPATIENT)
Dept: NEUROSURGERY | Facility: CLINIC | Age: 84
End: 2024-10-08
Payer: MEDICARE

## 2024-10-08 DIAGNOSIS — M48.062 SPINAL STENOSIS OF LUMBAR REGION WITH NEUROGENIC CLAUDICATION: Primary | ICD-10-CM

## 2024-10-08 PROCEDURE — 1160F RVW MEDS BY RX/DR IN RCRD: CPT | Performed by: NEUROLOGICAL SURGERY

## 2024-10-08 PROCEDURE — 99213 OFFICE O/P EST LOW 20 MIN: CPT | Performed by: NEUROLOGICAL SURGERY

## 2024-10-08 PROCEDURE — 1159F MED LIST DOCD IN RCRD: CPT | Performed by: NEUROLOGICAL SURGERY

## 2024-11-03 ENCOUNTER — APPOINTMENT (OUTPATIENT)
Dept: GENERAL RADIOLOGY | Facility: HOSPITAL | Age: 84
DRG: 853 | End: 2024-11-03
Payer: MEDICARE

## 2024-11-03 ENCOUNTER — ANESTHESIA (OUTPATIENT)
Dept: PERIOP | Facility: HOSPITAL | Age: 84
End: 2024-11-03
Payer: MEDICARE

## 2024-11-03 ENCOUNTER — HOSPITAL ENCOUNTER (INPATIENT)
Facility: HOSPITAL | Age: 84
LOS: 12 days | Discharge: NURSING, REHAB OR LONG-TERM CARE | DRG: 853 | End: 2024-11-15
Attending: EMERGENCY MEDICINE | Admitting: INTERNAL MEDICINE
Payer: MEDICARE

## 2024-11-03 ENCOUNTER — APPOINTMENT (OUTPATIENT)
Dept: CT IMAGING | Facility: HOSPITAL | Age: 84
DRG: 853 | End: 2024-11-03
Payer: MEDICARE

## 2024-11-03 ENCOUNTER — ANESTHESIA EVENT (OUTPATIENT)
Dept: PERIOP | Facility: HOSPITAL | Age: 84
End: 2024-11-03
Payer: MEDICARE

## 2024-11-03 DIAGNOSIS — A41.9 SEPTIC SHOCK: Primary | ICD-10-CM

## 2024-11-03 DIAGNOSIS — J96.01 ACUTE RESPIRATORY FAILURE WITH HYPOXIA: ICD-10-CM

## 2024-11-03 DIAGNOSIS — N17.9 ACUTE KIDNEY INJURY: ICD-10-CM

## 2024-11-03 DIAGNOSIS — N10 ACUTE PYELONEPHRITIS: ICD-10-CM

## 2024-11-03 DIAGNOSIS — R65.21 SHOCK, SEPTIC: ICD-10-CM

## 2024-11-03 DIAGNOSIS — A41.9 SHOCK, SEPTIC: ICD-10-CM

## 2024-11-03 DIAGNOSIS — R19.5 OCCULT GI BLEEDING: ICD-10-CM

## 2024-11-03 DIAGNOSIS — R65.21 SEPTIC SHOCK: Primary | ICD-10-CM

## 2024-11-03 DIAGNOSIS — I50.32 CHRONIC DIASTOLIC CHF (CONGESTIVE HEART FAILURE): ICD-10-CM

## 2024-11-03 DIAGNOSIS — N18.31 STAGE 3A CHRONIC KIDNEY DISEASE: ICD-10-CM

## 2024-11-03 DIAGNOSIS — N20.0 KIDNEY STONE ON RIGHT SIDE: ICD-10-CM

## 2024-11-03 LAB
ABO GROUP BLD: NORMAL
ALBUMIN SERPL-MCNC: 3.1 G/DL (ref 3.5–5.2)
ALBUMIN/GLOB SERPL: 1.7 G/DL
ALP SERPL-CCNC: 53 U/L (ref 39–117)
ALT SERPL W P-5'-P-CCNC: 18 U/L (ref 1–33)
ANION GAP SERPL CALCULATED.3IONS-SCNC: 13.1 MMOL/L (ref 5–15)
ARTERIAL PATENCY WRIST A: ABNORMAL
ARTERIAL PATENCY WRIST A: POSITIVE
AST SERPL-CCNC: 33 U/L (ref 1–32)
ATMOSPHERIC PRESS: 749.7 MMHG
ATMOSPHERIC PRESS: 750.7 MMHG
BACTERIA UR QL AUTO: ABNORMAL /HPF
BASE EXCESS BLDA CALC-SCNC: -5.9 MMOL/L (ref 0–2)
BASE EXCESS BLDA CALC-SCNC: -9.1 MMOL/L (ref 0–2)
BASOPHILS # BLD MANUAL: 0 10*3/MM3 (ref 0–0.2)
BASOPHILS NFR BLD MANUAL: 0 % (ref 0–1.5)
BDY SITE: ABNORMAL
BDY SITE: ABNORMAL
BILIRUB SERPL-MCNC: 0.4 MG/DL (ref 0–1.2)
BILIRUB UR QL STRIP: NEGATIVE
BLD GP AB SCN SERPL QL: NEGATIVE
BUN SERPL-MCNC: 30 MG/DL (ref 8–23)
BUN/CREAT SERPL: 13.8 (ref 7–25)
CALCIUM SPEC-SCNC: 8.4 MG/DL (ref 8.6–10.5)
CHLORIDE SERPL-SCNC: 107 MMOL/L (ref 98–107)
CLARITY UR: ABNORMAL
CO2 BLDA-SCNC: 17.7 MMOL/L (ref 23–27)
CO2 BLDA-SCNC: 19.1 MMOL/L (ref 23–27)
CO2 SERPL-SCNC: 19.9 MMOL/L (ref 22–29)
COLOR UR: YELLOW
CREAT SERPL-MCNC: 2.17 MG/DL (ref 0.57–1)
D-LACTATE SERPL-SCNC: 4.3 MMOL/L (ref 0.5–2)
D-LACTATE SERPL-SCNC: 4.9 MMOL/L (ref 0.5–2)
DEPRECATED RDW RBC AUTO: 43.5 FL (ref 37–54)
DEVICE COMMENT: ABNORMAL
DEVICE COMMENT: ABNORMAL
EGFRCR SERPLBLD CKD-EPI 2021: 21.8 ML/MIN/1.73
EOSINOPHIL # BLD MANUAL: 0 10*3/MM3 (ref 0–0.4)
EOSINOPHIL NFR BLD MANUAL: 0 % (ref 0.3–6.2)
ERYTHROCYTE [DISTWIDTH] IN BLOOD BY AUTOMATED COUNT: 13.3 % (ref 12.3–15.4)
EXPIRATION DATE: ABNORMAL
FECAL OCCULT BLOOD SCREEN, POC: POSITIVE
GAS FLOW AIRWAY: 3 LPM
GEN 5 2HR TROPONIN T REFLEX: 230 NG/L
GLOBULIN UR ELPH-MCNC: 1.8 GM/DL
GLUCOSE SERPL-MCNC: 97 MG/DL (ref 65–99)
GLUCOSE UR STRIP-MCNC: NEGATIVE MG/DL
HCO3 BLDA-SCNC: 16.7 MMOL/L (ref 22–28)
HCO3 BLDA-SCNC: 18.2 MMOL/L (ref 22–28)
HCT VFR BLD AUTO: 32.3 % (ref 34–46.6)
HEMODILUTION: NO
HEMODILUTION: NO
HGB BLD-MCNC: 10.9 G/DL (ref 12–15.9)
HGB UR QL STRIP.AUTO: NEGATIVE
HYALINE CASTS UR QL AUTO: ABNORMAL /LPF
INHALED O2 CONCENTRATION: 100 %
INR PPP: 1.24 (ref 0.9–1.1)
KETONES UR QL STRIP: ABNORMAL
LEUKOCYTE ESTERASE UR QL STRIP.AUTO: ABNORMAL
LYMPHOCYTES # BLD MANUAL: 0.17 10*3/MM3 (ref 0.7–3.1)
LYMPHOCYTES NFR BLD MANUAL: 5 % (ref 5–12)
Lab: 271
Lab: ABNORMAL
MAGNESIUM SERPL-MCNC: 1.5 MG/DL (ref 1.6–2.4)
MCH RBC QN AUTO: 29.9 PG (ref 26.6–33)
MCHC RBC AUTO-ENTMCNC: 33.7 G/DL (ref 31.5–35.7)
MCV RBC AUTO: 88.7 FL (ref 79–97)
METAMYELOCYTES NFR BLD MANUAL: 2 % (ref 0–0)
MODALITY: ABNORMAL
MODALITY: ABNORMAL
MONOCYTES # BLD: 0.86 10*3/MM3 (ref 0.1–0.9)
NEGATIVE CONTROL: NEGATIVE
NEUTROPHILS # BLD AUTO: 15.85 10*3/MM3 (ref 1.7–7)
NEUTROPHILS NFR BLD MANUAL: 92 % (ref 42.7–76)
NITRITE UR QL STRIP: NEGATIVE
NOTIFIED WHO: ABNORMAL
NRBC BLD AUTO-RTO: 0 /100 WBC (ref 0–0.2)
NT-PROBNP SERPL-MCNC: 5785 PG/ML (ref 0–1800)
O2 A-A PPRESDIFF RESPIRATORY: 0.4 MMHG
PCO2 BLDA: 29.6 MM HG (ref 35–45)
PCO2 BLDA: 34.7 MM HG (ref 35–45)
PEEP RESPIRATORY: 5 CM[H2O]
PH BLDA: 7.29 PH UNITS (ref 7.35–7.45)
PH BLDA: 7.4 PH UNITS (ref 7.35–7.45)
PH UR STRIP.AUTO: <=5 [PH] (ref 5–8)
PLAT MORPH BLD: NORMAL
PLATELET # BLD AUTO: 158 10*3/MM3 (ref 140–450)
PMV BLD AUTO: 11.9 FL (ref 6–12)
PO2 BLD: 299 MM[HG] (ref 0–500)
PO2 BLDA: 298.5 MM HG (ref 80–100)
PO2 BLDA: 88.1 MM HG (ref 80–100)
POSITIVE CONTROL: POSITIVE
POTASSIUM SERPL-SCNC: 3.6 MMOL/L (ref 3.5–5.2)
PROCALCITONIN SERPL-MCNC: 93.6 NG/ML (ref 0–0.25)
PROT SERPL-MCNC: 4.9 G/DL (ref 6–8.5)
PROT UR QL STRIP: ABNORMAL
PROTHROMBIN TIME: 15.8 SECONDS (ref 11.7–14.2)
QT INTERVAL: 375 MS
QTC INTERVAL: 487 MS
RBC # BLD AUTO: 3.64 10*6/MM3 (ref 3.77–5.28)
RBC # UR STRIP: ABNORMAL /HPF
RBC MORPH BLD: NORMAL
READ BACK: YES
REF LAB TEST METHOD: ABNORMAL
RH BLD: POSITIVE
SAO2 % BLDCOA: 96.9 % (ref 92–98.5)
SAO2 % BLDCOA: 99.9 % (ref 92–98.5)
SET MECH RESP RATE: 14
SODIUM SERPL-SCNC: 140 MMOL/L (ref 136–145)
SP GR UR STRIP: 1.01 (ref 1–1.03)
SQUAMOUS #/AREA URNS HPF: ABNORMAL /HPF
T&S EXPIRATION DATE: NORMAL
TOTAL RATE: 22 BREATHS/MINUTE
TOTAL RATE: 36 BREATHS/MINUTE
TROPONIN T DELTA: -44 NG/L
TROPONIN T SERPL HS-MCNC: 274 NG/L
UROBILINOGEN UR QL STRIP: ABNORMAL
VARIANT LYMPHS NFR BLD MANUAL: 1 % (ref 19.6–45.3)
VENTILATOR MODE: AC
VT ON VENT VENT: 400 ML
WBC # UR STRIP: ABNORMAL /HPF
WBC MORPH BLD: NORMAL
WBC NRBC COR # BLD AUTO: 17.23 10*3/MM3 (ref 3.4–10.8)

## 2024-11-03 PROCEDURE — 84484 ASSAY OF TROPONIN QUANT: CPT | Performed by: EMERGENCY MEDICINE

## 2024-11-03 PROCEDURE — 71045 X-RAY EXAM CHEST 1 VIEW: CPT

## 2024-11-03 PROCEDURE — 76000 FLUOROSCOPY <1 HR PHYS/QHP: CPT

## 2024-11-03 PROCEDURE — 94799 UNLISTED PULMONARY SVC/PX: CPT

## 2024-11-03 PROCEDURE — 36600 WITHDRAWAL OF ARTERIAL BLOOD: CPT | Performed by: EMERGENCY MEDICINE

## 2024-11-03 PROCEDURE — 85025 COMPLETE CBC W/AUTO DIFF WBC: CPT | Performed by: EMERGENCY MEDICINE

## 2024-11-03 PROCEDURE — 25010000002 MAGNESIUM SULFATE 2 GM/50ML SOLUTION: Performed by: EMERGENCY MEDICINE

## 2024-11-03 PROCEDURE — 87077 CULTURE AEROBIC IDENTIFY: CPT | Performed by: EMERGENCY MEDICINE

## 2024-11-03 PROCEDURE — 0BH17EZ INSERTION OF ENDOTRACHEAL AIRWAY INTO TRACHEA, VIA NATURAL OR ARTIFICIAL OPENING: ICD-10-PCS | Performed by: ANESTHESIOLOGY

## 2024-11-03 PROCEDURE — 85007 BL SMEAR W/DIFF WBC COUNT: CPT | Performed by: EMERGENCY MEDICINE

## 2024-11-03 PROCEDURE — 87186 SC STD MICRODIL/AGAR DIL: CPT | Performed by: EMERGENCY MEDICINE

## 2024-11-03 PROCEDURE — 87154 CUL TYP ID BLD PTHGN 6+ TRGT: CPT | Performed by: EMERGENCY MEDICINE

## 2024-11-03 PROCEDURE — C1758 CATHETER, URETERAL: HCPCS | Performed by: UROLOGY

## 2024-11-03 PROCEDURE — 83880 ASSAY OF NATRIURETIC PEPTIDE: CPT | Performed by: EMERGENCY MEDICINE

## 2024-11-03 PROCEDURE — 84145 PROCALCITONIN (PCT): CPT | Performed by: EMERGENCY MEDICINE

## 2024-11-03 PROCEDURE — 86901 BLOOD TYPING SEROLOGIC RH(D): CPT | Performed by: EMERGENCY MEDICINE

## 2024-11-03 PROCEDURE — 25810000003 SODIUM CHLORIDE 0.9 % SOLUTION: Performed by: EMERGENCY MEDICINE

## 2024-11-03 PROCEDURE — 93010 ELECTROCARDIOGRAM REPORT: CPT | Performed by: INTERNAL MEDICINE

## 2024-11-03 PROCEDURE — 80053 COMPREHEN METABOLIC PANEL: CPT | Performed by: EMERGENCY MEDICINE

## 2024-11-03 PROCEDURE — 93005 ELECTROCARDIOGRAM TRACING: CPT | Performed by: STUDENT IN AN ORGANIZED HEALTH CARE EDUCATION/TRAINING PROGRAM

## 2024-11-03 PROCEDURE — 93005 ELECTROCARDIOGRAM TRACING: CPT | Performed by: EMERGENCY MEDICINE

## 2024-11-03 PROCEDURE — 82803 BLOOD GASES ANY COMBINATION: CPT

## 2024-11-03 PROCEDURE — 82270 OCCULT BLOOD FECES: CPT | Performed by: EMERGENCY MEDICINE

## 2024-11-03 PROCEDURE — 82803 BLOOD GASES ANY COMBINATION: CPT | Performed by: EMERGENCY MEDICINE

## 2024-11-03 PROCEDURE — 36415 COLL VENOUS BLD VENIPUNCTURE: CPT | Performed by: EMERGENCY MEDICINE

## 2024-11-03 PROCEDURE — 94002 VENT MGMT INPAT INIT DAY: CPT

## 2024-11-03 PROCEDURE — 0T768DZ DILATION OF RIGHT URETER WITH INTRALUMINAL DEVICE, VIA NATURAL OR ARTIFICIAL OPENING ENDOSCOPIC: ICD-10-PCS | Performed by: UROLOGY

## 2024-11-03 PROCEDURE — 86900 BLOOD TYPING SEROLOGIC ABO: CPT | Performed by: EMERGENCY MEDICINE

## 2024-11-03 PROCEDURE — C1769 GUIDE WIRE: HCPCS | Performed by: UROLOGY

## 2024-11-03 PROCEDURE — 25810000003 LACTATED RINGERS PER 1000 ML: Performed by: ANESTHESIOLOGY

## 2024-11-03 PROCEDURE — 81001 URINALYSIS AUTO W/SCOPE: CPT | Performed by: EMERGENCY MEDICINE

## 2024-11-03 PROCEDURE — 51702 INSERT TEMP BLADDER CATH: CPT

## 2024-11-03 PROCEDURE — 25010000002 CEFTRIAXONE PER 250 MG: Performed by: EMERGENCY MEDICINE

## 2024-11-03 PROCEDURE — 83605 ASSAY OF LACTIC ACID: CPT | Performed by: EMERGENCY MEDICINE

## 2024-11-03 PROCEDURE — 25010000002 SUCCINYLCHOLINE PER 20 MG: Performed by: ANESTHESIOLOGY

## 2024-11-03 PROCEDURE — 25010000002 LIDOCAINE 2% SOLUTION: Performed by: ANESTHESIOLOGY

## 2024-11-03 PROCEDURE — 5A1945Z RESPIRATORY VENTILATION, 24-96 CONSECUTIVE HOURS: ICD-10-PCS | Performed by: INTERNAL MEDICINE

## 2024-11-03 PROCEDURE — 87040 BLOOD CULTURE FOR BACTERIA: CPT | Performed by: EMERGENCY MEDICINE

## 2024-11-03 PROCEDURE — 99291 CRITICAL CARE FIRST HOUR: CPT

## 2024-11-03 PROCEDURE — 25010000002 PROPOFOL 10 MG/ML EMULSION: Performed by: ANESTHESIOLOGY

## 2024-11-03 PROCEDURE — 86850 RBC ANTIBODY SCREEN: CPT | Performed by: EMERGENCY MEDICINE

## 2024-11-03 PROCEDURE — C2617 STENT, NON-COR, TEM W/O DEL: HCPCS | Performed by: UROLOGY

## 2024-11-03 PROCEDURE — 25810000003 SODIUM CHLORIDE 0.9 % SOLUTION: Performed by: INTERNAL MEDICINE

## 2024-11-03 PROCEDURE — 74176 CT ABD & PELVIS W/O CONTRAST: CPT

## 2024-11-03 PROCEDURE — 83735 ASSAY OF MAGNESIUM: CPT | Performed by: EMERGENCY MEDICINE

## 2024-11-03 PROCEDURE — 85610 PROTHROMBIN TIME: CPT | Performed by: EMERGENCY MEDICINE

## 2024-11-03 DEVICE — URETERAL STENT
Type: IMPLANTABLE DEVICE | Site: URETER | Status: FUNCTIONAL
Brand: CONTOUR™

## 2024-11-03 RX ORDER — ASPIRIN 81 MG/1
81 TABLET ORAL DAILY
Status: DISCONTINUED | OUTPATIENT
Start: 2024-11-03 | End: 2024-11-04

## 2024-11-03 RX ORDER — NITROGLYCERIN 0.4 MG/1
0.4 TABLET SUBLINGUAL
Status: DISCONTINUED | OUTPATIENT
Start: 2024-11-03 | End: 2024-11-15 | Stop reason: HOSPADM

## 2024-11-03 RX ORDER — PHENYLEPHRINE HCL IN 0.9% NACL 0.5 MG/5ML
.5-3 SYRINGE (ML) INTRAVENOUS
Status: DISCONTINUED | OUTPATIENT
Start: 2024-11-03 | End: 2024-11-08

## 2024-11-03 RX ORDER — ACETAMINOPHEN 325 MG/1
650 TABLET ORAL EVERY 4 HOURS PRN
Status: DISCONTINUED | OUTPATIENT
Start: 2024-11-03 | End: 2024-11-15 | Stop reason: HOSPADM

## 2024-11-03 RX ORDER — PROPOFOL 10 MG/ML
VIAL (ML) INTRAVENOUS AS NEEDED
Status: DISCONTINUED | OUTPATIENT
Start: 2024-11-03 | End: 2024-11-03 | Stop reason: SURG

## 2024-11-03 RX ORDER — SODIUM CHLORIDE 9 MG/ML
40 INJECTION, SOLUTION INTRAVENOUS AS NEEDED
Status: DISCONTINUED | OUTPATIENT
Start: 2024-11-03 | End: 2024-11-15 | Stop reason: HOSPADM

## 2024-11-03 RX ORDER — NAPROXEN SODIUM 220 MG/1
220 TABLET, FILM COATED ORAL DAILY PRN
COMMUNITY
End: 2024-11-15 | Stop reason: HOSPADM

## 2024-11-03 RX ORDER — SUCCINYLCHOLINE CHLORIDE 20 MG/ML
INJECTION INTRAMUSCULAR; INTRAVENOUS AS NEEDED
Status: DISCONTINUED | OUTPATIENT
Start: 2024-11-03 | End: 2024-11-03 | Stop reason: SURG

## 2024-11-03 RX ORDER — SODIUM CHLORIDE, SODIUM LACTATE, POTASSIUM CHLORIDE, CALCIUM CHLORIDE 600; 310; 30; 20 MG/100ML; MG/100ML; MG/100ML; MG/100ML
INJECTION, SOLUTION INTRAVENOUS CONTINUOUS PRN
Status: DISCONTINUED | OUTPATIENT
Start: 2024-11-03 | End: 2024-11-03 | Stop reason: SURG

## 2024-11-03 RX ORDER — SODIUM CHLORIDE 9 MG/ML
75 INJECTION, SOLUTION INTRAVENOUS CONTINUOUS
Status: DISCONTINUED | OUTPATIENT
Start: 2024-11-03 | End: 2024-11-04

## 2024-11-03 RX ORDER — LIDOCAINE HYDROCHLORIDE 20 MG/ML
INJECTION, SOLUTION INFILTRATION; PERINEURAL AS NEEDED
Status: DISCONTINUED | OUTPATIENT
Start: 2024-11-03 | End: 2024-11-03 | Stop reason: SURG

## 2024-11-03 RX ORDER — NOREPINEPHRINE BITARTRATE 0.03 MG/ML
.02-.3 INJECTION, SOLUTION INTRAVENOUS
Status: DISCONTINUED | OUTPATIENT
Start: 2024-11-04 | End: 2024-11-08

## 2024-11-03 RX ORDER — SODIUM CHLORIDE 0.9 % (FLUSH) 0.9 %
10 SYRINGE (ML) INJECTION EVERY 12 HOURS SCHEDULED
Status: DISCONTINUED | OUTPATIENT
Start: 2024-11-03 | End: 2024-11-15 | Stop reason: HOSPADM

## 2024-11-03 RX ORDER — SODIUM CHLORIDE 0.9 % (FLUSH) 0.9 %
10 SYRINGE (ML) INJECTION AS NEEDED
Status: DISCONTINUED | OUTPATIENT
Start: 2024-11-03 | End: 2024-11-15 | Stop reason: HOSPADM

## 2024-11-03 RX ORDER — PHENYLEPHRINE HCL IN 0.9% NACL 0.5 MG/5ML
SYRINGE (ML) INTRAVENOUS
Status: COMPLETED
Start: 2024-11-03 | End: 2024-11-03

## 2024-11-03 RX ORDER — MAGNESIUM SULFATE HEPTAHYDRATE 40 MG/ML
2 INJECTION, SOLUTION INTRAVENOUS ONCE
Status: COMPLETED | OUTPATIENT
Start: 2024-11-03 | End: 2024-11-03

## 2024-11-03 RX ORDER — CHOLECALCIFEROL (VITAMIN D3) 25 MCG
1000 TABLET ORAL DAILY
COMMUNITY

## 2024-11-03 RX ORDER — LANOLIN ALCOHOL/MO/W.PET/CERES
400 CREAM (GRAM) TOPICAL DAILY
Status: DISCONTINUED | OUTPATIENT
Start: 2024-11-03 | End: 2024-11-04

## 2024-11-03 RX ORDER — ESCITALOPRAM OXALATE 10 MG/1
10 TABLET ORAL DAILY
Status: DISCONTINUED | OUTPATIENT
Start: 2024-11-03 | End: 2024-11-04

## 2024-11-03 RX ADMIN — SODIUM CHLORIDE 2244 ML: 9 INJECTION, SOLUTION INTRAVENOUS at 15:15

## 2024-11-03 RX ADMIN — MUPIROCIN 1 APPLICATION: 20 OINTMENT TOPICAL at 20:18

## 2024-11-03 RX ADMIN — Medication 0.5 MCG/KG/MIN: at 22:28

## 2024-11-03 RX ADMIN — PROPOFOL INJECTABLE EMULSION 30 MCG/KG/MIN: 10 INJECTION, EMULSION INTRAVENOUS at 22:21

## 2024-11-03 RX ADMIN — Medication 0.02 MCG/KG/MIN: at 23:57

## 2024-11-03 RX ADMIN — PROPOFOL 50 MG: 10 INJECTION, EMULSION INTRAVENOUS at 21:54

## 2024-11-03 RX ADMIN — SODIUM CHLORIDE 75 ML/HR: 9 INJECTION, SOLUTION INTRAVENOUS at 20:18

## 2024-11-03 RX ADMIN — SODIUM CHLORIDE 1000 ML: 9 INJECTION, SOLUTION INTRAVENOUS at 16:52

## 2024-11-03 RX ADMIN — SODIUM CHLORIDE, POTASSIUM CHLORIDE, SODIUM LACTATE AND CALCIUM CHLORIDE: 600; 310; 30; 20 INJECTION, SOLUTION INTRAVENOUS at 21:45

## 2024-11-03 RX ADMIN — CEFTRIAXONE 2000 MG: 2 INJECTION, POWDER, FOR SOLUTION INTRAMUSCULAR; INTRAVENOUS at 16:57

## 2024-11-03 RX ADMIN — MAGNESIUM SULFATE HEPTAHYDRATE 2 G: 40 INJECTION, SOLUTION INTRAVENOUS at 16:59

## 2024-11-03 RX ADMIN — Medication 10 ML: at 20:18

## 2024-11-03 RX ADMIN — PROPOFOL 30 MCG/KG/MIN: 10 INJECTION, EMULSION INTRAVENOUS at 22:11

## 2024-11-03 RX ADMIN — PROPOFOL INJECTABLE EMULSION 30 MCG/KG/MIN: 10 INJECTION, EMULSION INTRAVENOUS at 23:44

## 2024-11-03 RX ADMIN — SUCCINYLCHOLINE CHLORIDE 120 MG: 20 INJECTION, SOLUTION INTRAMUSCULAR; INTRAVENOUS; PARENTERAL at 21:54

## 2024-11-03 RX ADMIN — Medication 1.1 MCG/KG/MIN: at 22:50

## 2024-11-03 RX ADMIN — LIDOCAINE HYDROCHLORIDE 100 MG: 20 INJECTION, SOLUTION INFILTRATION; PERINEURAL at 21:54

## 2024-11-03 NOTE — ED NOTES
..Nursing report ED to floor  Kristyn Lugo  84 y.o.  female    HPI :  HPI  Stated Reason for Visit: dizziness, n.v.d  History Obtained From: EMS    Chief Complaint  Chief Complaint   Patient presents with    Dizziness    Nausea    Vomiting    Diarrhea       Admitting doctor:   Juan Antonio Lee MD    Admitting diagnosis:   The primary encounter diagnosis was Septic shock. Diagnoses of Acute kidney injury, Occult GI bleeding, Acute respiratory failure with hypoxia, Acute pyelonephritis, and Kidney stone on right side, distal UVJ with hydronephrosis were also pertinent to this visit.    Code status:   Current Code Status       Date Active Code Status Order ID Comments User Context       Not on file            Allergies:   Fenofibrate and Penicillins    Isolation:   No active isolations    Intake and Output    Intake/Output Summary (Last 24 hours) at 11/3/2024 1810  Last data filed at 11/3/2024 1745  Gross per 24 hour   Intake 3044 ml   Output --   Net 3044 ml       Weight:       11/03/24  1429   Weight: 74.8 kg (164 lb 14.5 oz)       Most recent vitals:   Vitals:    11/03/24 1702 11/03/24 1716 11/03/24 1746 11/03/24 1801   BP: 90/64 (!) 87/55 99/59 105/61   Pulse: 103 101 99 98   Resp:       Temp:       SpO2: 96% 96% 96% 96%   Weight:       Height:           Active LDAs/IV Access:   Lines, Drains & Airways       Active LDAs       Name Placement date Placement time Site Days    Peripheral IV 11/03/24 1426 Anterior;Left;Proximal Forearm 11/03/24  1426  Forearm  less than 1    Peripheral IV 11/03/24 1545 Right Antecubital 11/03/24  1545  Antecubital  less than 1    Urethral Catheter Silicone 16 Fr. 11/03/24  1741  -- less than 1                    Labs (abnormal labs have a star):   Labs Reviewed   BLOOD GAS, ARTERIAL - Abnormal; Notable for the following components:       Result Value    pCO2, Arterial 29.6 (*)     HCO3, Arterial 18.2 (*)     Base Excess, Arterial -5.9 (*)     CO2 Content 19.1 (*)     All other  components within normal limits   COMPREHENSIVE METABOLIC PANEL - Abnormal; Notable for the following components:    BUN 30 (*)     Creatinine 2.17 (*)     CO2 19.9 (*)     Calcium 8.4 (*)     Total Protein 4.9 (*)     Albumin 3.1 (*)     AST (SGOT) 33 (*)     eGFR 21.8 (*)     All other components within normal limits    Narrative:     GFR Normal >60  Chronic Kidney Disease <60  Kidney Failure <15    The GFR formula is only valid for adults with stable renal function between ages 18 and 70.   PROTIME-INR - Abnormal; Notable for the following components:    Protime 15.8 (*)     INR 1.24 (*)     All other components within normal limits   URINALYSIS W/ MICROSCOPIC IF INDICATED (NO CULTURE) - Abnormal; Notable for the following components:    Appearance, UA Cloudy (*)     Ketones, UA Trace (*)     Protein, UA 30 mg/dL (1+) (*)     Leuk Esterase, UA Trace (*)     All other components within normal limits   BNP (IN-HOUSE) - Abnormal; Notable for the following components:    proBNP 5,785.0 (*)     All other components within normal limits    Narrative:     This assay is used as an aid in the diagnosis of individuals suspected of having heart failure. It can be used as an aid in the diagnosis of acute decompensated heart failure (ADHF) in patients presenting with signs and symptoms of ADHF to the emergency department (ED). In addition, NT-proBNP of <300 pg/mL indicates ADHF is not likely.    Age Range Result Interpretation  NT-proBNP Concentration (pg/mL:      <50             Positive            >450                   Gray                 300-450                    Negative             <300    50-75           Positive            >900                  Gray                300-900                  Negative            <300      >75             Positive            >1800                  Gray                300-1800                  Negative            <300   TROPONIN - Abnormal; Notable for the following components:    HS  "Troponin T 274 (*)     All other components within normal limits    Narrative:     High Sensitive Troponin T Reference Range:  <14.0 ng/L- Negative Female for AMI  <22.0 ng/L- Negative Male for AMI  >=14 - Abnormal Female indicating possible myocardial injury.  >=22 - Abnormal Male indicating possible myocardial injury.   Clinicians would have to utilize clinical acumen, EKG, Troponin, and serial changes to determine if it is an Acute Myocardial Infarction or myocardial injury due to an underlying chronic condition.        LACTIC ACID, PLASMA - Abnormal; Notable for the following components:    Lactate 4.9 (*)     All other components within normal limits   PROCALCITONIN - Abnormal; Notable for the following components:    Procalcitonin 93.60 (*)     All other components within normal limits    Narrative:     As a Marker for Sepsis (Non-Neonates):    1. <0.5 ng/mL represents a low risk of severe sepsis and/or septic shock.  2. >2 ng/mL represents a high risk of severe sepsis and/or septic shock.    As a Marker for Lower Respiratory Tract Infections that require antibiotic therapy:    PCT on Admission    Antibiotic Therapy       6-12 Hrs later    >0.5                Strongly Recommended  >0.25 - <0.5        Recommended   0.1 - 0.25          Discouraged              Remeasure/reassess PCT  <0.1                Strongly Discouraged     Remeasure/reassess PCT    As 28 day mortality risk marker: \"Change in Procalcitonin Result\" (>80% or <=80%) if Day 0 (or Day 1) and Day 4 values are available. Refer to http://[email or web address]-pct-calculator.com    Change in PCT <=80%  A decrease of PCT levels below or equal to 80% defines a positive change in PCT test result representing a higher risk for 28-day all-cause mortality of patients diagnosed with severe sepsis for septic shock.    Change in PCT >80%  A decrease of PCT levels of more than 80% defines a negative change in PCT result representing a lower risk for 28-day all-cause " mortality of patients diagnosed with severe sepsis or septic shock.      MAGNESIUM - Abnormal; Notable for the following components:    Magnesium 1.5 (*)     All other components within normal limits   CBC WITH AUTO DIFFERENTIAL - Abnormal; Notable for the following components:    WBC 17.23 (*)     RBC 3.64 (*)     Hemoglobin 10.9 (*)     Hematocrit 32.3 (*)     All other components within normal limits   MANUAL DIFFERENTIAL - Abnormal; Notable for the following components:    Neutrophil % 92.0 (*)     Lymphocyte % 1.0 (*)     Eosinophil % 0.0 (*)     Metamyelocyte % 2.0 (*)     Neutrophils Absolute 15.85 (*)     Lymphocytes Absolute 0.17 (*)     All other components within normal limits   POCT OCCULT BLOOD STOOL (ED ONLY) - Abnormal; Notable for the following components:    Fecal Occult Blood Positive (*)     All other components within normal limits   BLOOD CULTURE   BLOOD CULTURE   BLOOD GAS, VENOUS   LACTIC ACID, REFLEX   HIGH SENSITIVITIY TROPONIN T 2HR   URINALYSIS, MICROSCOPIC ONLY   ELECTROLYTES + H&H   TYPE AND SCREEN   CBC AND DIFFERENTIAL    Narrative:     The following orders were created for panel order CBC & Differential.  Procedure                               Abnormality         Status                     ---------                               -----------         ------                     CBC Auto Differential[[id number]]        Abnormal            Final result                 Please view results for these tests on the individual orders.       EKG:   ECG 12 Lead Other; Hypoxia   Final Result   HEART FJTK=614  bpm   RR Gseiqoax=155  ms   NC Digkloeg=971  ms   P Horizontal Axis=-20  deg   P Front Axis=65  deg   QRSD Kpaltlih=097  ms   QT Nycrnyvx=992  ms   VAcE=554  ms   QRS Axis=-57  deg   T Wave Axis=-2  deg   - ABNORMAL ECG -   Sinus tachycardia   Nonspecific  IVCD with LAD   LVH with secondary repolarization abnormality   No Prior Tracing for Comparison   Electronically Signed By: Alexis Mueller  (CHIKIS) ([facility]) 2024-11-03 17:30:13   Date and Time of Study:2024-11-03 17:23:48          Meds given in ED:   Medications   sodium chloride 0.9 % bolus 2,244 mL (0 mL Intravenous Stopped 11/3/24 1629)   cefTRIAXone (ROCEPHIN) 2,000 mg in sodium chloride 0.9 % 100 mL MBP (0 mg Intravenous Stopped 11/3/24 1727)   sodium chloride 0.9 % bolus 1,000 mL (1,000 mL Intravenous New Bag 11/3/24 1652)   magnesium sulfate 2g/50 mL (PREMIX) infusion (0 g Intravenous Stopped 11/3/24 1745)       Imaging results:  XR Chest 1 View    Result Date: 11/3/2024  No focal pulmonary consolidation. Cardiomegaly. Follow-up as clinical indications persist.  This report was finalized on 11/3/2024 3:33 PM by Dr. Jose Hardy M.D on Workstation: [employer]       Ambulatory status:   - bedrest    Social issues:   Social History     Socioeconomic History    Marital status:    Tobacco Use    Smoking status: Former     Types: Cigarettes    Smokeless tobacco: Never   Substance and Sexual Activity    Drug use: Never    Sexual activity: Defer       Peripheral Neurovascular  Peripheral Neurovascular (Adult)  Peripheral Neurovascular WDL: WDL    Neuro Cognitive  Neuro Cognitive (Adult)  Cognitive/Neuro/Behavioral WDL: .WDL except  Additional Documentation: Dizziness Assessment (Group)  Dizziness Assessment  Dizziness Reported Symptoms: weak, intermittent  Dizziness Occurs With: no activity    Learning  Learning Assessment  Learning Readiness and Ability: no barriers identified    Respiratory  Respiratory WDL  Respiratory WDL: WDL    Abdominal Pain       Pain Assessments  Pain (Adult)  (0-10) Pain Rating: Rest: 0    NIH Stroke Scale       Wendy Vo RN  11/03/24 18:10 EST

## 2024-11-03 NOTE — H&P
History and Physical    Patient Name: Kristyn Lugo  Age/Sex: 84 y.o. female  : 1940  MRN: [id number]    Date of Admission: 11/3/2024  Date of Encounter Visit: 24  Encounter Provider: Juan Antonio Lee MD  Place of Service: [facility]   Patient Care Team:  Sean Fisher MD as PCP - General (Internal Medicine)      Subjective:     Chief Complaint: Hypotensive shock    History of Present Illness:  Kristyn Lugo is a 84 y.o. female with no prior history of any chronic kidney disease except having a kidney nodule/cyst for which she was seen by Dr. Carrillo in the past and she sees him once a year.  But she is not aware of any problem with kidney failure  Not aware of any problem with kidney stone before, she does have problem with chronic constipation for which she has to take chronic laxative and had to deal with some occasional diarrhea as a result of that  She does have chronic diastolic heart failure, echocardiogram from [facility] cannot be retrieved on Care Everywhere  She came in because she was not feeling well, she was having diarrhea for the last 24 hours, patient is a poor historian initially denies any problem but after getting the picture from the CAT scan and after talking to her she did admit having some right sided abdominal pain  She denies any dysuria or hematuria but she did notice that she was not making as much urine  She had no fever but she had episodes when she was having chills and she attributed that to the cold temperature in the room  She had some dry heaves and occasional nausea and 1 episode of vomiting but no hematemesis, she denies any blood in the stool  The diarrhea was watery but the last bowel movement was having some formed particles  Patient was found to be hypotensive, she had so for more than 2-1/2 L of crystalloids and she was still in the 80s systolic  She was given Rocephin IV and CT of the abdomen was given, we do not see  "any obvious GI issues however patient does have dilated right kidney with hydro nephrosis and a stone in the right ureter probably causing the above  We were unable to get a urinalysis to check for UTI because patient has been dehydrated and hypotensive.    Pulmonary Functions Testing Results:    No results found for: \"FEV1\", \"FVC\", \"DNL8KHZ\", \"TLC\", \"DLCO\"    Review of Systems:   Review of Systems  As per above    Past Medical History:  Past Medical History:   Diagnosis Date    Aortic valve regurgitation     Chronic kidney disease     Low back pain        No past surgical history on file.    Home Medications:   (Not in a hospital admission)      Inpatient Medications:  Scheduled Meds:cefTRIAXone, 2,000 mg, Intravenous, Once  magnesium sulfate, 2 g, Intravenous, Once  sodium chloride, 1,000 mL, Intravenous, Once      Continuous Infusions:   PRN Meds:.    Allergies:  Allergies   Allergen Reactions    Fenofibrate Unknown - High Severity     Pt is unaware    Penicillins Unknown - High Severity       Past Social History:  Social History     Socioeconomic History    Marital status:    Tobacco Use    Smoking status: Former     Types: Cigarettes    Smokeless tobacco: Never   Substance and Sexual Activity    Drug use: Never    Sexual activity: Defer       Past Family History:  No family history on file.        Objective:   Temp:  [97.7 °F (36.5 °C)] 97.7 °F (36.5 °C)  Heart Rate:  [78-95] 95  Resp:  [16] 16  BP: ()/(52-65) 98/65   SpO2:  [88 %-98 %] 98 %  on  Flow (L/min) (Oxygen Therapy):  [4] 4 Device (Oxygen Therapy): nasal cannula    Intake/Output Summary (Last 24 hours) at 11/3/2024 1648  Last data filed at 11/3/2024 1629  Gross per 24 hour   Intake 2894 ml   Output --   Net 2894 ml     Body mass index is 31.17 kg/m².      11/03/24  1429   Weight: 74.8 kg (164 lb 14.5 oz)     Weight change:     Physical Exam:   Physical Exam   General:  Sick looking lady, alert and oriented x4, pleasant                   " "Head:    Normocephalic, atraumatic.   Eyes:          Conjunctivae and sclerae normal, no icterus, PERRLA   Throat:   No oral lesions, no thrush, oral mucosa moist.    Neck:   Supple, trachea midline.  After 2 and half liter of IV crystalloids her neck veins are slightly visible but I would not call the jugular venous distention yet   Lungs:     Normal chest on inspection, faint crackles over the bases bilaterally no wheezes. Respirations regular, even and unlabored.     Heart:    Regular rhythm and normal rate.  No murmurs, gallops, or rubs noted.   Abdomen:     Soft, nontender, nondistended, positive bowel sounds.    Extremities:   No clubbing, cyanosis, trace edema   Pulses:   Pulses palpable and equal bilaterally.    Skin:   No bleeding or rash.   Neuro:   Non-focal.  Moves all extremities well.    Psychiatric:   Normal mood and affect.     Lab Review:   Results from last 7 days   Lab Units 11/03/24  1546   SODIUM mmol/L 140   POTASSIUM mmol/L 3.6   CHLORIDE mmol/L 107   CO2 mmol/L 19.9*   BUN mg/dL 30*   CREATININE mg/dL 2.17*   GLUCOSE mg/dL 97   CALCIUM mg/dL 8.4*   AST (SGOT) U/L 33*   ALT (SGPT) U/L 18   ALBUMIN g/dL 3.1*     Results from last 7 days   Lab Units 11/03/24  1546   HSTROP T ng/L 274*     Results from last 7 days   Lab Units 11/03/24  1546   WBC 10*3/mm3 17.23*   HEMOGLOBIN g/dL 10.9*   HEMATOCRIT % 32.3*   PLATELETS 10*3/mm3 158   MCV fL 88.7   MCH pg 29.9   MCHC g/dL 33.7   RDW % 13.3   RDW-SD fl 43.5   MPV fL 11.9   NRBC /100 WBC 0.0     Results from last 7 days   Lab Units 11/03/24  1546   INR  1.24*     Results from last 7 days   Lab Units 11/03/24  1546   MAGNESIUM mg/dL 1.5*           Invalid input(s): \"LDLCALC\"  Results from last 7 days   Lab Units 11/03/24  1546   PROBNP pg/mL 5,785.0*         Results from last 7 days   Lab Units 11/03/24  1558   PH, ARTERIAL pH units 7.396   PCO2, ARTERIAL mm Hg 29.6*   PO2 ART mm Hg 88.1   HCO3 ART mmol/L 18.2*     Results from last 7 days   Lab Units " 11/03/24  1546   LACTATE mmol/L 4.9*                               Imaging:  Imaging Results (Most Recent)       Procedure Component Value Units Date/Time    XR Chest 1 View [[id number]] Collected: 11/03/24 1532     Updated: 11/03/24 1536    Narrative:      XR CHEST 1 VW-     HISTORY: Female who is 84 years-old, hypoxia     TECHNIQUE: Frontal view of the chest     COMPARISON: None available     FINDINGS: The heart is enlarged. Aorta is tortuous, calcified. Pulmonary  vasculature is unremarkable. No focal pulmonary consolidation, pleural  effusion, or pneumothorax. No acute osseous process.       Impression:      No focal pulmonary consolidation. Cardiomegaly. Follow-up as  clinical indications persist.     This report was finalized on 11/3/2024 3:33 PM by Dr. Jose Hardy M.D on Workstation: [id number]               I personally viewed and interpreted the patient's imaging studies.    Assessment:     Right hydronephrosis with nephrolithiasis  Acute renal failure  Possible UTI  Septic versus hypertensive/hypovolemic shock  Diarrhea  Diastolic congestive heart failure      Plan:     Patient already got IV hydration, we will go ahead and consult urology since she has urinary obstruction with sepsis and kidney stone and will assess for any potential need for urgent stenting.  Meanwhile she is unable to give us any urine sample to assess for UTI we will go ahead and insert a Booth catheter  Patient is already on Rocephin which will be continued she has no history of any resistant passages to consider another antibiotic for the UTI  The lung windows show this can be interpreted as early pneumonia but to me this is more hydrostatic and the Rocephin should be more than adequate to start with.  Patient has some minimal cough but I do not doubt any clinical pneumonia based on the history  Will consult her nephrologist, she does have kidney cyst and right hydroureter with hydronephrosis and will consider further  recommendation depending on her response to the IV fluid  Patient has diastolic heart failure, will monitor for any sign of decompensation since she already have some signs of that on physical exam.  Will start IV pressors if she is still hypotensive despite the initial fluid bolus  In case the patient is still hypotensive after the fluid bolus she will need to be on IV pressors and may need a central line  Meanwhile we will discontinue her Aleve and any other nonsteroidal anti-inflammatory medication, and patient will be taken off the hydrochlorothiazide and of course the metoprolol until her blood pressure is better  Okay to continue the aspirin, she is going to have a stent placed by urology and unless requested otherwise we will continue with it.    Addendum:  Patient will be seen by urology, might need to go to the operating room later this evening, we will admit to the ICU and monitor and stabilize while awaiting further management from neurology, appreciate all the input from the ER team and the urology team      Time: Critical care 42 min    Juan Antonio Lee MD  [facility]   11/03/24  16:48 EST    Dictated utilizing Dragon dictation

## 2024-11-03 NOTE — ED PROVIDER NOTES
" EMERGENCY DEPARTMENT ENCOUNTER    Room Number:  40/40  Date of encounter:  11/3/2024  PCP: Sean Fisher MD  Historian: Patient and daughter  Relevant information and history provided by sources other than the patient will be included below and in the ED Course.  Review of pertinent past medical records may also be included in record below and ED Course.    HPI:  Chief Complaint: Vomiting and diarrhea, weakness \"I feel crappy\"  A complete HPI/ROS/PMH/PSH/SH/FH are unobtainable due to: Not applicable  Context: Kristyn Lugo is a 84 y.o. female who presents to the ED c/o patient started to feel bad on Friday night.  Started with some vomiting.  She vomited several times.  And then yesterday mainly dry heaves.  Is only had a very small amount to eat since Friday night.  She had a little bit of cookies 2 and a little bit of banana and maybe a little bit of crackers.  Not much liquid at all.  She then had a large amount of watery diarrhea on Saturday morning.  There was no obvious blood in the diarrhea.  Then the diarrhea became more loose.  She again is not certain that she sees any blood or any definitive black stool.  She does take an aspirin and is not on any other anticoagulants.  She has had a little bit of chills and just generalized weakness and no energy today.  As she states to me \"I feel crappy\".  She called her daughter to come over.  Daughter stated that she appeared tired and weak and a little less responsive and so she called the paramedics and brought her over here.  With EMS her blood pressure was in the 80s systolic.  They started an IV.  She reports no shortness of breath.  Denies any chest pain.  Has some mild crampy abdominal pain with the diarrhea and vomiting.  Currently no pain.  She chronically has urinary frequency but denies any dysuria.  Denies any headache.  She has a chronic cough that she does not feel has changed from what is normal for her.        Previous Episodes: " No  Current Symptoms: See above    MEDICAL HISTORY REVIEWED    I reviewed some records on this patient.  She has a history of chronic back pain chronic kidney disease aortic valve regurgitation.  Did review her medicine list.      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  Did review her medicine list from that office visit.  She is on aspirin otherwise no anticoagulant's.  Former tobacco user history of aortic regurgitation.  When I try to look at her echo from October 30, 2024 it says that it is not an attachment that is not available for me to look at.  I reviewed the CT angiogram of the chest report that was done October 30, 2024 there was no focal consolidation pleural effusions or pneumothorax she has bilateral pulmonary nodules that were seen.  She has dilatation of her ascending aorta at 4.8 cm small hiatal hernia.    PAST MEDICAL HISTORY  Active Ambulatory Problems     Diagnosis Date Noted    Lumbar spinal stenosis 10/05/2023    Spondylolisthesis of lumbar region 10/05/2023     Resolved Ambulatory Problems     Diagnosis Date Noted    No Resolved Ambulatory Problems     Past Medical History:   Diagnosis Date    Aortic valve regurgitation     Chronic kidney disease     Low back pain          PAST SURGICAL HISTORY  No past surgical history on file.      FAMILY HISTORY  No family history on file.      SOCIAL HISTORY  Social History     Socioeconomic History    Marital status:    Tobacco Use    Smoking status: Former     Types: Cigarettes    Smokeless tobacco: Never   Substance and Sexual Activity    Drug use: Never    Sexual activity: Defer         ALLERGIES  Fenofibrate and Penicillins        REVIEW OF SYSTEMS  Review of Systems     All systems reviewed and negative except for those discussed in HPI.       PHYSICAL  EXAM    I have reviewed the triage vital signs and nursing notes.    ED Triage Vitals [11/03/24 1429]   Temp Heart Rate Resp BP SpO2   97.7 °F (36.5 °C) 78 16 94/52 94 %      Temp src Heart Rate Source Patient Position BP Location FiO2 (%)   -- -- -- -- --       GENERAL: This patient looks tired and weak.  Is ill-appearing.  .Vital signs on my initial evaluation afebrile.  Blood pressure is 90 to low 100s.  Oxygen saturation when she first arrived was in the mid to low 90 percentile range on room air.  And then dropped into the mid to upper 80 percentile range on room air.  She placed upon 4 L of oxygen.  Her oxygen saturation is 94% on 3 L of oxygen.  Heart rate is normal.  HENT: nares patent  Head/neck/ face are symmetric without gross deformity, signs of trauma, or swelling  EYES: no scleral icterus, conjunctival pallor.  Pupils equal round reactive light extraocular muscles intact  NECK: Supple, no meningismus  CV: regular rhythm, regular rate with intact distal pulses.  Systolic murmur soft 2 out of 6  RESPIRATORY: normal effort and no respiratory distress.  Lungs grossly clear.  Not taking deep breaths.  ABDOMEN: soft and nontender.  Obese.  Normal inspection.  No significant tenderness on exam.  Rectal exam.  No gross blood no melena.  She has liquidy loose green stool that is Heema prompt positive.  Back exam is unremarkable.  Chronic low back pain which is at baseline.  Intact femoral pulses that are equal and symmetric  MUSCULOSKELETAL: no deformity.  Equal symmetric distal pulses.  Some mild coolness to the distal extremities at the distal feet and toes and fingers.  No cyanosis.  NEURO: alert and appropriate, moves all extremities, follows commands.  No focal motor or sensory changes generalized weakness.  SKIN: warm, dry    Vital signs and nursing notes reviewed.        LAB RESULTS  Recent Results (from the past 24 hours)   POC Occult Blood Stool    Collection Time: 11/03/24  3:09 PM    Specimen: Stool    Result Value Ref Range    Fecal Occult Blood Positive (A) Negative    Lot Number [id number]     Expiration Date Right 28 2025     Positive Control Positive Positive    Negative Control Negative Negative   Comprehensive Metabolic Panel    Collection Time: 11/03/24  3:46 PM    Specimen: Blood   Result Value Ref Range    Glucose 97 65 - 99 mg/dL    BUN 30 (H) 8 - 23 mg/dL    Creatinine 2.17 (H) 0.57 - 1.00 mg/dL    Sodium 140 136 - 145 mmol/L    Potassium 3.6 3.5 - 5.2 mmol/L    Chloride 107 98 - 107 mmol/L    CO2 19.9 (L) 22.0 - 29.0 mmol/L    Calcium 8.4 (L) 8.6 - 10.5 mg/dL    Total Protein 4.9 (L) 6.0 - 8.5 g/dL    Albumin 3.1 (L) 3.5 - 5.2 g/dL    ALT (SGPT) 18 1 - 33 U/L    AST (SGOT) 33 (H) 1 - 32 U/L    Alkaline Phosphatase 53 39 - 117 U/L    Total Bilirubin 0.4 0.0 - 1.2 mg/dL    Globulin 1.8 gm/dL    A/G Ratio 1.7 g/dL    BUN/Creatinine Ratio 13.8 7.0 - 25.0    Anion Gap 13.1 5.0 - 15.0 mmol/L    eGFR 21.8 (L) >60.0 mL/min/1.73   Protime-INR    Collection Time: 11/03/24  3:46 PM    Specimen: Blood   Result Value Ref Range    Protime 15.8 (H) 11.7 - 14.2 Seconds    INR 1.24 (H) 0.90 - 1.10   BNP    Collection Time: 11/03/24  3:46 PM    Specimen: Blood   Result Value Ref Range    proBNP 5,785.0 (H) 0.0 - 1,800.0 pg/mL   High Sensitivity Troponin T    Collection Time: 11/03/24  3:46 PM    Specimen: Blood   Result Value Ref Range    HS Troponin T 274 (C) <14 ng/L   Lactic Acid, Plasma    Collection Time: 11/03/24  3:46 PM    Specimen: Blood   Result Value Ref Range    Lactate 4.9 (C) 0.5 - 2.0 mmol/L   Magnesium    Collection Time: 11/03/24  3:46 PM    Specimen: Blood   Result Value Ref Range    Magnesium 1.5 (L) 1.6 - 2.4 mg/dL   Type & Screen    Collection Time: 11/03/24  3:46 PM    Specimen: Blood   Result Value Ref Range    ABO Type O     RH type Positive     Antibody Screen Negative     T&S Expiration Date 11/6/2024 11:59:59 PM    CBC Auto Differential    Collection Time: 11/03/24  3:46 PM    Specimen: Blood    Result Value Ref Range    WBC 17.23 (H) 3.40 - 10.80 10*3/mm3    RBC 3.64 (L) 3.77 - 5.28 10*6/mm3    Hemoglobin 10.9 (L) 12.0 - 15.9 g/dL    Hematocrit 32.3 (L) 34.0 - 46.6 %    MCV 88.7 79.0 - 97.0 fL    MCH 29.9 26.6 - 33.0 pg    MCHC 33.7 31.5 - 35.7 g/dL    RDW 13.3 12.3 - 15.4 %    RDW-SD 43.5 37.0 - 54.0 fl    MPV 11.9 6.0 - 12.0 fL    Platelets 158 140 - 450 10*3/mm3    nRBC 0.0 0.0 - 0.2 /100 WBC   Manual Differential    Collection Time: 11/03/24  3:46 PM    Specimen: Blood   Result Value Ref Range    Neutrophil % 92.0 (H) 42.7 - 76.0 %    Lymphocyte % 1.0 (L) 19.6 - 45.3 %    Monocyte % 5.0 5.0 - 12.0 %    Eosinophil % 0.0 (L) 0.3 - 6.2 %    Basophil % 0.0 0.0 - 1.5 %    Metamyelocyte % 2.0 (H) 0.0 - 0.0 %    Neutrophils Absolute 15.85 (H) 1.70 - 7.00 10*3/mm3    Lymphocytes Absolute 0.17 (L) 0.70 - 3.10 10*3/mm3    Monocytes Absolute 0.86 0.10 - 0.90 10*3/mm3    Eosinophils Absolute 0.00 0.00 - 0.40 10*3/mm3    Basophils Absolute 0.00 0.00 - 0.20 10*3/mm3    RBC Morphology Normal Normal    WBC Morphology Normal Normal    Platelet Morphology Normal Normal   Blood Gas, Arterial -    Collection Time: 11/03/24  3:58 PM    Specimen: Arterial Blood   Result Value Ref Range    Site Right Radial     William's Test Positive     pH, Arterial 7.396 7.350 - 7.450 pH units    pCO2, Arterial 29.6 (L) 35.0 - 45.0 mm Hg    pO2, Arterial 88.1 80.0 - 100.0 mm Hg    HCO3, Arterial 18.2 (L) 22.0 - 28.0 mmol/L    Base Excess, Arterial -5.9 (L) 0.0 - 2.0 mmol/L    O2 Saturation, Arterial 96.9 92.0 - 98.5 %    CO2 Content 19.1 (L) 23 - 27 mmol/L    Barometric Pressure for Blood Gas 750.7000 mmHg    Modality Cannula     Flow Rate 3.0000 lpm    Rate 22 Breaths/minute    Hemodilution No     Device Comment sat 99%    ECG 12 Lead Other; Hypoxia    Collection Time: 11/03/24  5:23 PM   Result Value Ref Range    QT Interval 375 ms    QTC Interval 487 ms       Ordered the above labs and independently reviewed the  results.        RADIOLOGY  CT Abdomen Pelvis Without Contrast    Result Date: 11/3/2024  CT ABDOMEN AND PELVIS WITHOUT IV CONTRAST  HISTORY: 84-year-old female with leukocytosis and hypotension. Vomiting and diarrhea.  TECHNIQUE: Radiation dose reduction techniques were utilized, including automated exposure control and exposure modulation based on body size. 3 mm images were obtained through the abdomen and pelvis without the administration of IV contrast. No priors for comparison.  FINDINGS: 1. There is a 5 mm stone within the distal right ureter just proximal to the UVJ with mild-moderate right hydroureteronephrosis. There is moderate bilateral perinephric stranding. There are no left renal or ureteral stones and there is no hydronephrosis on the left. The perinephric stranding may be secondary to acute UTI pyelonephritis. Urinary bladder is collapsed. Please correlate clinically.  Many phleboliths are noted. There is a 5 cm right renal cyst.  2. Noncontrasted liver, gallbladder, spleen, pancreas, and right adrenal appear unremarkable. There is nodular hyperplasia of the left adrenal gland.  3. There is no acute bowel abnormality. There is no evidence for colitis or appendicitis. Noncontrasted uterus and adnexa appear unremarkable. There are moderate abdominal aortic atherosclerotic changes without aneurysmal dilatation.  4. There is pulmonary vascular congestion and dependent atelectatic changes at the lung bases.      XR Chest 1 View    Result Date: 11/3/2024  XR CHEST 1 VW-  HISTORY: Female who is 84 years-old, hypoxia  TECHNIQUE: Frontal view of the chest  COMPARISON: None available  FINDINGS: The heart is enlarged. Aorta is tortuous, calcified. Pulmonary vasculature is unremarkable. No focal pulmonary consolidation, pleural effusion, or pneumothorax. No acute osseous process.      No focal pulmonary consolidation. Cardiomegaly. Follow-up as clinical indications persist.  This report was finalized on  11/3/2024 3:33 PM by Dr. Jose Hardy M.D on Workstation: [email or web address]       I ordered the above noted radiological studies. Reviewed by me and discussed with radiologist.  See dictation for official radiology interpretation.      PROCEDURES    Critical Care    Performed by: Talha Robin MD  Authorized by: Juan Antonio Lee MD    Critical care provider statement:     Critical care time (minutes): 65.    Critical care time was exclusive of:  Separately billable procedures and treating other patients    Critical care was necessary to treat or prevent imminent or life-threatening deterioration of the following conditions:  Circulatory failure, shock, sepsis, renal failure and respiratory failure    Critical care was time spent personally by me on the following activities:  Blood draw for specimens, development of treatment plan with patient or surrogate, discussions with consultants, evaluation of patient's response to treatment, examination of patient, obtaining history from patient or surrogate, review of old charts, re-evaluation of patient's condition, pulse oximetry, ordering and review of radiographic studies, ordering and review of laboratory studies and ordering and performing treatments and interventions    I assumed direction of critical care for this patient from another provider in my specialty: no      Care discussed with: admitting provider          MEDICATIONS GIVEN IN ER    Medications   sodium chloride 0.9 % bolus 2,244 mL (0 mL Intravenous Stopped 11/3/24 1629)   cefTRIAXone (ROCEPHIN) 2,000 mg in sodium chloride 0.9 % 100 mL MBP (0 mg Intravenous Stopped 11/3/24 1727)   sodium chloride 0.9 % bolus 1,000 mL (1,000 mL Intravenous New Bag 11/3/24 1652)   magnesium sulfate 2g/50 mL (PREMIX) infusion (0 g Intravenous Stopped 11/3/24 1745)         All labs have been independently reviewed by me.  All radiology studies have been reviewed by me and I discussed with radiologist dictating the report when  indicated below.  All EKG's independently viewed and interpreted by me.  Discussion below represents my analysis of pertinent findings related to patient's condition, differential diagnosis, treatment plan and final disposition.        PROGRESS, DATA ANALYSIS, CONSULTS, AND MEDICAL DECISION MAKING    This is a patient that presents with decreased p.o. intake with nausea vomiting and diarrhea who is weak very likely from hypotension.  I think there is probably multi factors at play here.  I think she is volume contracted.  She is Heema prompt positive but I do not see any melena or gross blood.  Patient does not report any gross blood on exam.  She is pale.  She could be pale because of the slightly low blood pressure.  Will get a give her a fluid bolus.  She clinically looks dry to me.  She is not on any diuretics and has no fluid restriction.  Organ to check lab work.  She will need to be admitted to the hospital.  Discussed with the patient and daughter my concerns that she is seriously ill my initial treatment plan and test that we will order.  All questions answered.      ED Course as of 11/03/24 1746   Sun Nov 03, 2024   1610 pH, Arterial: 7.396 [MM]   1610 pCO2, Arterial(!): 29.6 [MM]   1610 WBC(!): 17.23 [MM]   1610 Hemoglobin(!): 10.9  No old hemoglobin that I can see. [MM]   1611 I independently looked at the chest x-ray.  I did not see any active disease.  No focal pulmonary consolidation no pneumothorax no pleural effusions. [MM]   1611 I reviewed the radiologist report of the chest x-ray.  No active disease seen. [MM]   1619 On my reevaluation of the patient.  She states that she is feeling a little bit better.  Blood pressure is 98/80.  Heart rate is normal.  Oxygen saturation is now 96% on 2 L.  Informed her and the daughter of initial results of the test.  All questions answered [MM]   1627 Lactate(!!): 4.9 [MM]   1627 Talked with the patient as well as daughter again.  She has received her 2.2 L  fluid bolus.  Again blood pressure is 98/80.  She really does not have any pain at this time.  Denies again any dysuria or urinary frequency.  I did reiterate the serious condition of the patient.  She is a full code at this time. [MM]   1628 I will go ahead and give her another fluid bolus. [MM]   1629 Creatinine(!): 2.17  2 years ago patient had a normal creatinine. [MM]   1629 CO2(!): 19.9 [MM]   1629 AST (SGOT)(!): 33  My own independent interpretation of the EKG that was done at 5:23 PM reveals a rate of 101 it is a sinus tachycardia there is some intraventricular conduction delay left axis deviation with signs of LVH.  Q waves in V1 and V2 I do not see any definitive acute injury pattern QT looks normal  No old EKG to compare with [MM]   1631 Still waiting on urine to be collected.  Patient does not have to urinate.  Probably does not have to urinate because of volume contraction. [MM]   1632 I am concerned her source of hypotension is  [MM]   1632 Likely related to some volume contraction and dehydration.  She has got acute kidney injury.  There is also the concern that this potentially could be from an infectious etiology.  Currently has no pain.  I am going to do a CT scan of her abdomen pelvis without.  I do not see any obvious pneumonia.  Urinalysis as mentioned above is pending. [MM]   1635 proBNP(!): 5,785.0  She has no signs of congestive heart failure on the x-ray.  No pleural effusions. [MM]   1640 HS Troponin T(!!): 274  She has no chest pain.  I think it is elevated secondary to her hypotension and acute medical condition rather than from a direct coronary artery blockage. [MM]   1645 BP: 98/65  I did discuss the case with Dr. Lee who is on for the intensive care unit.  He is going to come down and see the patient.  I informed him of the patient's pending symptoms results of the tests and my initial treatment plan.  All questions answered [MM]   1719 I reviewed the CT scan of the abdomen pelvis  report.  There is a 5 mm stone within the distal right ureter proximal to the UVJ with mild to moderate right-sided hydronephrosis moderate bilateral perinephric stranding this could be secondary to a UTI or pyelonephritis.  Urinary bladder is collapsed.  No acute bowel abnormality appreciated.  I reviewed the radiologist interpretation of the CT scan.  Please see radiologist complete dictation. [MM]   1720 When I first saw and evaluated this patient and reevaluated she said she really and have any pain.  But after results of the CT scan she states that she has been having some right flank pain.  She has been having maybe some urinary urgency.  I think that she has an acute kidney stone causing hydronephrosis and concerned about acute pyelonephritis because of septic shock. [MM]   1720 Dr. Lee came down and saw her.  He reviewed the CT scan.  Will take her in the unit we will get a consult urology. [MM]   1734 I discussed the case with urologist Dr. Ledesma.  Right now there is a Coda in the operating room.  His earliest time is 8 PM to get her to the OR.  Will get the patient to the intensive care unit and then as soon as he can he is can to take the patient to the operating room. [MM]   1745 Talked with daughter and family at length.  Informed her about the results of the CT scan and lab work and the conversation with the intensive care unit physician and the urologist.  Informed her of her serious condition and the treatment plan. [MM]      ED Course User Index  [MM] Talha Robin MD       AS OF 17:46 EST VITALS:    BP - (!) 87/55  HR - 101  TEMP - 97.7 °F (36.5 °C)  02 SATS - 96%    SOCIAL DETERMINANTS OF HEALTH THAT IMPACT OR LIMIT CARE (For example..Homelessness,safe discharge, inability to obtain care, follow up, or prescriptions):      DIAGNOSIS  Final diagnoses:   Septic shock   Acute kidney injury   Occult GI bleeding   Acute respiratory failure with hypoxia   Acute pyelonephritis   Kidney stone on right  side, distal UVJ with hydronephrosis         DISPOSITION  Patient will be mated to the intensive care unit          DICTATED UTILIZING DRAGON DICTATION    Note Disclaimer: At [facility], we believe that sharing information builds trust and better relationships. You are receiving this note because you recently visited [facility]. It is possible you will see health information before a provider has talked with you about it. This kind of information can be easy to misunderstand. To help you fully understand what it means for your health, we urge you to discuss this note with your provider.       Talha Robin MD  11/03/24 2443

## 2024-11-03 NOTE — PROGRESS NOTES
Clinical Pharmacy Services: Medication History    Kristyn Lugo is a 84 y.o. female presenting to [facility] for Acute pyelonephritis [N10]    She  has a past medical history of Aortic valve regurgitation, Chronic kidney disease, and Low back pain.    Allergies as of 11/03/2024 - Reviewed 11/03/2024   Allergen Reaction Noted    Fenofibrate Unknown - High Severity 07/27/2022    Penicillins Unknown - High Severity 11/26/2014       Medication information was obtained from: Patient home med list  Pharmacy and Phone Number:     Prior to Admission Medications       Prescriptions Last Dose Informant Patient Reported? Taking?    acetaminophen (TYLENOL) 500 MG tablet 11/2/2024 Self Yes Yes    Take 1 tablet by mouth.    aspirin 81 MG EC tablet 11/2/2024 Self Yes Yes    Take 1 tablet by mouth Daily.    cholecalciferol (Vitamin D, Cholecalciferol,) 25 MCG (1000 UT) tablet 11/2/2024 Self Yes Yes    Take 1 tablet by mouth Daily.    Docusate Calcium (STOOL SOFTENER PO) 11/2/2024 Self Yes Yes    Take 1 tablet by mouth Daily.    folic acid (FOLVITE) 400 MCG tablet 11/2/2024 Self Yes Yes    Take 1 tablet by mouth Daily.    metoprolol-hydrochlorothiazide (LOPRESSOR HCT) 100-25 MG per tablet 11/2/2024 Self Yes Yes    Take 1 tablet by mouth Daily.    multivitamin with minerals (CENTRUM SILVER PO) 11/2/2024 Self Yes Yes    Take 1 tablet by mouth Daily.    naproxen sodium (ALEVE) 220 MG tablet 11/3/2024 Self Yes Yes    Take 1 tablet by mouth Daily As Needed.    Omega-3 Fatty Acids (fish oil) 1200 MG capsule capsule 11/2/2024 Self Yes Yes    Take 1 capsule by mouth Daily With Breakfast.    simvastatin (ZOCOR) 40 MG tablet 11/2/2024 Self Yes Yes    Take 1 tablet by mouth Daily.    escitalopram (LEXAPRO) 10 MG tablet Not Taking  Yes No    Take 1 tablet by mouth Daily.    Patient not taking:  Reported on 11/3/2024              Medication notes: Patient reports she d/c lexapro about 6 months ago and believes it was causing  constipation.     She has not taken any medications today besides one aleve this morning.     This medication list is complete to the best of my knowledge as of 11/3/2024    Please call if questions.    Adonay Pro  Pharmacy Intern  Phone [phone]  11/3/2024 17:28 EST

## 2024-11-03 NOTE — ED NOTES
Lab called to collect second set of blood cultures due to patient being a difficult stick, multiple Rns and ERTs attempted collection.

## 2024-11-03 NOTE — ED NOTES
"   11/03/24 1545   Peripheral IV 11/03/24 1545 Right Antecubital   Placement date: If unknown, DO NOT use \"Add Comment\" note/Placement time: If unknown, DO NOT use \"Add Comment\" note: 11/03/24 1545   Hand Hygiene Completed: Yes  Size (Gauge): 20 G  Orientation: Right  Location: Antecubital  Site Prep: Chlorhexidine  ...   Site Assessment Clean;Dry;Intact   Dressing Type Transparent   Line Status Blood return noted;Saline locked;Flushed   Dressing Status Intact;Dry;Clean   Dressing Intervention New dressing   Phlebitis 0-->no symptoms     .Ultrasound Inserted IV Site: RAC    Catheter Length: 2.5     Diameter: 0.3    Depth: 1      Vascular Access Score= 5  1) Palpable / Visible / Dis  2) Palpable / Viasible / Not Distended  3) Easily Palpable / Not Visibile  4) Poorly Palpable / Visible  5) Poorly / Nonpalpable / NV    "

## 2024-11-03 NOTE — ED NOTES
Patient arrives via [facility] from home for complaints of N/V/D since Friday. Patient states she checked her BP this morning and noted it was lower. Patient has also experienced dizziness. Alert and oriented x4.

## 2024-11-04 ENCOUNTER — APPOINTMENT (OUTPATIENT)
Dept: CARDIOLOGY | Facility: HOSPITAL | Age: 84
DRG: 853 | End: 2024-11-04
Payer: MEDICARE

## 2024-11-04 ENCOUNTER — APPOINTMENT (OUTPATIENT)
Dept: GENERAL RADIOLOGY | Facility: HOSPITAL | Age: 84
DRG: 853 | End: 2024-11-04
Payer: MEDICARE

## 2024-11-04 LAB
ALBUMIN SERPL-MCNC: 2.9 G/DL (ref 3.5–5.2)
ALBUMIN/GLOB SERPL: 1.1 G/DL
ALP SERPL-CCNC: 102 U/L (ref 39–117)
ALT SERPL W P-5'-P-CCNC: 22 U/L (ref 1–33)
ANION GAP SERPL CALCULATED.3IONS-SCNC: 14.5 MMOL/L (ref 5–15)
ANION GAP SERPL CALCULATED.3IONS-SCNC: 17 MMOL/L (ref 5–15)
ANION GAP SERPL CALCULATED.3IONS-SCNC: 17.5 MMOL/L (ref 5–15)
AORTIC DIMENSIONLESS INDEX: 0.6 (DI)
ARTERIAL PATENCY WRIST A: ABNORMAL
ARTERIAL PATENCY WRIST A: ABNORMAL
ASCENDING AORTA: 4.4 CM
AST SERPL-CCNC: 42 U/L (ref 1–32)
ATMOSPHERIC PRESS: 750.8 MMHG
ATMOSPHERIC PRESS: 752 MMHG
BACTERIA BLD CULT: ABNORMAL
BASE EXCESS BLDA CALC-SCNC: -8.5 MMOL/L (ref 0–2)
BASE EXCESS BLDA CALC-SCNC: -9 MMOL/L (ref 0–2)
BASOPHILS # BLD MANUAL: 0 10*3/MM3 (ref 0–0.2)
BASOPHILS NFR BLD MANUAL: 0 % (ref 0–1.5)
BDY SITE: ABNORMAL
BDY SITE: ABNORMAL
BH CV ECHO MEAS - ACS: 1.63 CM
BH CV ECHO MEAS - AI P1/2T: 301.7 MSEC
BH CV ECHO MEAS - AO MAX PG: 25.2 MMHG
BH CV ECHO MEAS - AO MEAN PG: 14 MMHG
BH CV ECHO MEAS - AO ROOT DIAM: 2.7 CM
BH CV ECHO MEAS - AO V2 MAX: 251 CM/SEC
BH CV ECHO MEAS - AO V2 VTI: 41 CM
BH CV ECHO MEAS - AVA(I,D): 1.96 CM2
BH CV ECHO MEAS - EDV(CUBED): 85.2 ML
BH CV ECHO MEAS - EDV(MOD-SP2): 55 ML
BH CV ECHO MEAS - EDV(MOD-SP4): 65 ML
BH CV ECHO MEAS - EF(MOD-BP): 52.4 %
BH CV ECHO MEAS - EF(MOD-SP2): 54.5 %
BH CV ECHO MEAS - EF(MOD-SP4): 49.2 %
BH CV ECHO MEAS - ESV(CUBED): 30.5 ML
BH CV ECHO MEAS - ESV(MOD-SP2): 25 ML
BH CV ECHO MEAS - ESV(MOD-SP4): 33 ML
BH CV ECHO MEAS - FS: 29 %
BH CV ECHO MEAS - IVS/LVPW: 1.33 CM
BH CV ECHO MEAS - IVSD: 1.2 CM
BH CV ECHO MEAS - LAT PEAK E' VEL: 5.4 CM/SEC
BH CV ECHO MEAS - LV DIASTOLIC VOL/BSA (35-75): 36.9 CM2
BH CV ECHO MEAS - LV MASS(C)D: 158.2 GRAMS
BH CV ECHO MEAS - LV MAX PG: 11.2 MMHG
BH CV ECHO MEAS - LV MEAN PG: 6 MMHG
BH CV ECHO MEAS - LV SYSTOLIC VOL/BSA (12-30): 18.7 CM2
BH CV ECHO MEAS - LV V1 MAX: 167 CM/SEC
BH CV ECHO MEAS - LV V1 VTI: 27.7 CM
BH CV ECHO MEAS - LVIDD: 4.4 CM
BH CV ECHO MEAS - LVIDS: 3.1 CM
BH CV ECHO MEAS - LVOT AREA: 2.9 CM2
BH CV ECHO MEAS - LVOT DIAM: 1.92 CM
BH CV ECHO MEAS - LVPWD: 0.9 CM
BH CV ECHO MEAS - MED PEAK E' VEL: 5.2 CM/SEC
BH CV ECHO MEAS - MV A MAX VEL: 152 CM/SEC
BH CV ECHO MEAS - MV DEC SLOPE: 707.5 CM/SEC2
BH CV ECHO MEAS - MV DEC TIME: 0.23 SEC
BH CV ECHO MEAS - MV E MAX VEL: 142 CM/SEC
BH CV ECHO MEAS - MV E/A: 0.93
BH CV ECHO MEAS - MV MAX PG: 11 MMHG
BH CV ECHO MEAS - MV MEAN PG: 5 MMHG
BH CV ECHO MEAS - MV P1/2T: 65.2 MSEC
BH CV ECHO MEAS - MV V2 VTI: 47.7 CM
BH CV ECHO MEAS - MVA(P1/2T): 3.4 CM2
BH CV ECHO MEAS - MVA(VTI): 1.68 CM2
BH CV ECHO MEAS - PA ACC TIME: 0.07 SEC
BH CV ECHO MEAS - PA V2 MAX: 142.5 CM/SEC
BH CV ECHO MEAS - PULM DIAS VEL: 54.9 CM/SEC
BH CV ECHO MEAS - PULM S/D: 1.15
BH CV ECHO MEAS - PULM SYS VEL: 63.3 CM/SEC
BH CV ECHO MEAS - QP/QS: 1.09
BH CV ECHO MEAS - RAP SYSTOLE: 3 MMHG
BH CV ECHO MEAS - RV MAX PG: 2.6 MMHG
BH CV ECHO MEAS - RV V1 MAX: 79.9 CM/SEC
BH CV ECHO MEAS - RV V1 VTI: 13.4 CM
BH CV ECHO MEAS - RVOT DIAM: 2.9 CM
BH CV ECHO MEAS - RVSP: 29 MMHG
BH CV ECHO MEAS - SV(LVOT): 80.3 ML
BH CV ECHO MEAS - SV(MOD-SP2): 30 ML
BH CV ECHO MEAS - SV(MOD-SP4): 32 ML
BH CV ECHO MEAS - SV(RVOT): 87.3 ML
BH CV ECHO MEAS - SVI(LVOT): 45.5 ML/M2
BH CV ECHO MEAS - SVI(MOD-SP2): 17 ML/M2
BH CV ECHO MEAS - SVI(MOD-SP4): 18.1 ML/M2
BH CV ECHO MEAS - TR MAX PG: 26.3 MMHG
BH CV ECHO MEAS - TR MAX VEL: 256.4 CM/SEC
BH CV ECHO MEASUREMENTS AVERAGE E/E' RATIO: 26.79
BH CV XLRA - RV BASE: 2.6 CM
BH CV XLRA - RV LENGTH: 7.4 CM
BH CV XLRA - RV MID: 2.5 CM
BH CV XLRA - TDI S': 18.8 CM/SEC
BILIRUB SERPL-MCNC: 0.6 MG/DL (ref 0–1.2)
BOTTLE TYPE: ABNORMAL
BUN SERPL-MCNC: 36 MG/DL (ref 8–23)
BUN SERPL-MCNC: 37 MG/DL (ref 8–23)
BUN SERPL-MCNC: 39 MG/DL (ref 8–23)
BUN/CREAT SERPL: 14.6 (ref 7–25)
BUN/CREAT SERPL: 14.9 (ref 7–25)
BUN/CREAT SERPL: 18.1 (ref 7–25)
CALCIUM SPEC-SCNC: 7.6 MG/DL (ref 8.6–10.5)
CALCIUM SPEC-SCNC: 7.6 MG/DL (ref 8.6–10.5)
CALCIUM SPEC-SCNC: 7.9 MG/DL (ref 8.6–10.5)
CHLORIDE SERPL-SCNC: 109 MMOL/L (ref 98–107)
CO2 BLDA-SCNC: 18.6 MMOL/L (ref 23–27)
CO2 BLDA-SCNC: 19 MMOL/L (ref 23–27)
CO2 SERPL-SCNC: 15 MMOL/L (ref 22–29)
CO2 SERPL-SCNC: 15.5 MMOL/L (ref 22–29)
CO2 SERPL-SCNC: 17.5 MMOL/L (ref 22–29)
CREAT SERPL-MCNC: 2.15 MG/DL (ref 0.57–1)
CREAT SERPL-MCNC: 2.41 MG/DL (ref 0.57–1)
CREAT SERPL-MCNC: 2.54 MG/DL (ref 0.57–1)
CREAT UR-MCNC: 80.8 MG/DL
D-LACTATE SERPL-SCNC: 2.1 MMOL/L (ref 0.5–2)
D-LACTATE SERPL-SCNC: 2.2 MMOL/L (ref 0.5–2)
D-LACTATE SERPL-SCNC: 2.4 MMOL/L (ref 0.5–2)
DEPRECATED RDW RBC AUTO: 40.9 FL (ref 37–54)
DEPRECATED RDW RBC AUTO: 43.6 FL (ref 37–54)
DEVICE COMMENT: ABNORMAL
DEVICE COMMENT: ABNORMAL
EGFRCR SERPLBLD CKD-EPI 2021: 18.1 ML/MIN/1.73
EGFRCR SERPLBLD CKD-EPI 2021: 19.3 ML/MIN/1.73
EGFRCR SERPLBLD CKD-EPI 2021: 22.1 ML/MIN/1.73
EOSINOPHIL # BLD MANUAL: 0 10*3/MM3 (ref 0–0.4)
EOSINOPHIL NFR BLD MANUAL: 0 % (ref 0.3–6.2)
ERYTHROCYTE [DISTWIDTH] IN BLOOD BY AUTOMATED COUNT: 13.2 % (ref 12.3–15.4)
ERYTHROCYTE [DISTWIDTH] IN BLOOD BY AUTOMATED COUNT: 13.7 % (ref 12.3–15.4)
GLOBULIN UR ELPH-MCNC: 2.6 GM/DL
GLUCOSE BLDC GLUCOMTR-MCNC: 106 MG/DL (ref 70–130)
GLUCOSE BLDC GLUCOMTR-MCNC: 114 MG/DL (ref 70–130)
GLUCOSE BLDC GLUCOMTR-MCNC: 116 MG/DL (ref 70–130)
GLUCOSE BLDC GLUCOMTR-MCNC: 136 MG/DL (ref 70–130)
GLUCOSE SERPL-MCNC: 126 MG/DL (ref 65–99)
GLUCOSE SERPL-MCNC: 128 MG/DL (ref 65–99)
GLUCOSE SERPL-MCNC: 136 MG/DL (ref 65–99)
HCO3 BLDA-SCNC: 17.5 MMOL/L (ref 22–28)
HCO3 BLDA-SCNC: 17.7 MMOL/L (ref 22–28)
HCT VFR BLD AUTO: 34 % (ref 34–46.6)
HCT VFR BLD AUTO: 34.2 % (ref 34–46.6)
HEMODILUTION: NO
HEMODILUTION: NO
HGB BLD-MCNC: 11.6 G/DL (ref 12–15.9)
HGB BLD-MCNC: 11.7 G/DL (ref 12–15.9)
INHALED O2 CONCENTRATION: 40 %
INHALED O2 CONCENTRATION: 40 %
LYMPHOCYTES # BLD MANUAL: 1.06 10*3/MM3 (ref 0.7–3.1)
LYMPHOCYTES NFR BLD MANUAL: 4 % (ref 5–12)
Lab: ABNORMAL
Lab: ABNORMAL
MAGNESIUM SERPL-MCNC: 2 MG/DL (ref 1.6–2.4)
MCH RBC QN AUTO: 29.5 PG (ref 26.6–33)
MCH RBC QN AUTO: 29.9 PG (ref 26.6–33)
MCHC RBC AUTO-ENTMCNC: 34.1 G/DL (ref 31.5–35.7)
MCHC RBC AUTO-ENTMCNC: 34.2 G/DL (ref 31.5–35.7)
MCV RBC AUTO: 86.5 FL (ref 79–97)
MCV RBC AUTO: 87.5 FL (ref 79–97)
MODALITY: ABNORMAL
MODALITY: ABNORMAL
MONOCYTES # BLD: 1.41 10*3/MM3 (ref 0.1–0.9)
NEUTROPHILS # BLD AUTO: 32.75 10*3/MM3 (ref 1.7–7)
NEUTROPHILS NFR BLD MANUAL: 92.9 % (ref 42.7–76)
NOTIFIED WHO: ABNORMAL
NOTIFIED WHO: ABNORMAL
O2 A-A PPRESDIFF RESPIRATORY: 0.4 MMHG
O2 A-A PPRESDIFF RESPIRATORY: 0.4 MMHG
PCO2 BLDA: 36.8 MM HG (ref 35–45)
PCO2 BLDA: 40.3 MM HG (ref 35–45)
PEEP RESPIRATORY: 5 CM[H2O]
PEEP RESPIRATORY: 5 CM[H2O]
PH BLDA: 7.25 PH UNITS (ref 7.35–7.45)
PH BLDA: 7.29 PH UNITS (ref 7.35–7.45)
PHOSPHATE SERPL-MCNC: 3.2 MG/DL (ref 2.5–4.5)
PLAT MORPH BLD: NORMAL
PLATELET # BLD AUTO: 156 10*3/MM3 (ref 140–450)
PLATELET # BLD AUTO: 182 10*3/MM3 (ref 140–450)
PMV BLD AUTO: 12.1 FL (ref 6–12)
PMV BLD AUTO: 12.2 FL (ref 6–12)
PO2 BLD: 229 MM[HG] (ref 0–500)
PO2 BLD: 268 MM[HG] (ref 0–500)
PO2 BLDA: 107.3 MM HG (ref 80–100)
PO2 BLDA: 91.5 MM HG (ref 80–100)
POTASSIUM SERPL-SCNC: 2.9 MMOL/L (ref 3.5–5.2)
POTASSIUM SERPL-SCNC: 3.7 MMOL/L (ref 3.5–5.2)
POTASSIUM SERPL-SCNC: 3.8 MMOL/L (ref 3.5–5.2)
PROT ?TM UR-MCNC: 121.8 MG/DL
PROT SERPL-MCNC: 5.5 G/DL (ref 6–8.5)
PROT/CREAT UR: 1507.4 MG/G CREA (ref 0–200)
QT INTERVAL: 296 MS
QTC INTERVAL: 452 MS
RBC # BLD AUTO: 3.91 10*6/MM3 (ref 3.77–5.28)
RBC # BLD AUTO: 3.93 10*6/MM3 (ref 3.77–5.28)
RBC MORPH BLD: NORMAL
READ BACK: YES
READ BACK: YES
SAO2 % BLDCOA: 95.6 % (ref 92–98.5)
SAO2 % BLDCOA: 97.5 % (ref 92–98.5)
SET MECH RESP RATE: 20
SET MECH RESP RATE: 20
SINUS: 2.9 CM
SMUDGE CELLS BLD QL SMEAR: ABNORMAL
SODIUM SERPL-SCNC: 139 MMOL/L (ref 136–145)
SODIUM SERPL-SCNC: 141 MMOL/L (ref 136–145)
SODIUM SERPL-SCNC: 144 MMOL/L (ref 136–145)
SODIUM UR-SCNC: <20 MMOL/L
STJ: 2.7 CM
TOTAL RATE: 22 BREATHS/MINUTE
TOTAL RATE: 24 BREATHS/MINUTE
TROPONIN T SERPL HS-MCNC: 268 NG/L
VARIANT LYMPHS NFR BLD MANUAL: 3 % (ref 19.6–45.3)
VENTILATOR MODE: ABNORMAL
VENTILATOR MODE: AC
VT ON VENT VENT: 400 ML
VT ON VENT VENT: 400 ML
WBC NRBC COR # BLD AUTO: 32.4 10*3/MM3 (ref 3.4–10.8)
WBC NRBC COR # BLD AUTO: 35.25 10*3/MM3 (ref 3.4–10.8)

## 2024-11-04 PROCEDURE — 84100 ASSAY OF PHOSPHORUS: CPT | Performed by: INTERNAL MEDICINE

## 2024-11-04 PROCEDURE — 25010000002 FENTANYL CITRATE (PF) 50 MCG/ML SOLUTION: Performed by: HOSPITALIST

## 2024-11-04 PROCEDURE — 71045 X-RAY EXAM CHEST 1 VIEW: CPT

## 2024-11-04 PROCEDURE — 84484 ASSAY OF TROPONIN QUANT: CPT | Performed by: INTERNAL MEDICINE

## 2024-11-04 PROCEDURE — 84156 ASSAY OF PROTEIN URINE: CPT | Performed by: INTERNAL MEDICINE

## 2024-11-04 PROCEDURE — 82948 REAGENT STRIP/BLOOD GLUCOSE: CPT

## 2024-11-04 PROCEDURE — 25010000002 CEFTRIAXONE PER 250 MG: Performed by: INTERNAL MEDICINE

## 2024-11-04 PROCEDURE — 94003 VENT MGMT INPAT SUBQ DAY: CPT

## 2024-11-04 PROCEDURE — 93306 TTE W/DOPPLER COMPLETE: CPT

## 2024-11-04 PROCEDURE — 25810000003 SODIUM CHLORIDE 0.9 % SOLUTION: Performed by: INTERNAL MEDICINE

## 2024-11-04 PROCEDURE — 84300 ASSAY OF URINE SODIUM: CPT | Performed by: INTERNAL MEDICINE

## 2024-11-04 PROCEDURE — 99222 1ST HOSP IP/OBS MODERATE 55: CPT | Performed by: INTERNAL MEDICINE

## 2024-11-04 PROCEDURE — 83605 ASSAY OF LACTIC ACID: CPT | Performed by: INTERNAL MEDICINE

## 2024-11-04 PROCEDURE — C1751 CATH, INF, PER/CENT/MIDLINE: HCPCS

## 2024-11-04 PROCEDURE — 25010000002 PROPOFOL 10 MG/ML EMULSION: Performed by: ANESTHESIOLOGY

## 2024-11-04 PROCEDURE — 85025 COMPLETE CBC W/AUTO DIFF WBC: CPT | Performed by: INTERNAL MEDICINE

## 2024-11-04 PROCEDURE — 82803 BLOOD GASES ANY COMBINATION: CPT | Performed by: HOSPITALIST

## 2024-11-04 PROCEDURE — 94761 N-INVAS EAR/PLS OXIMETRY MLT: CPT

## 2024-11-04 PROCEDURE — 93306 TTE W/DOPPLER COMPLETE: CPT | Performed by: INTERNAL MEDICINE

## 2024-11-04 PROCEDURE — 82570 ASSAY OF URINE CREATININE: CPT | Performed by: INTERNAL MEDICINE

## 2024-11-04 PROCEDURE — 94799 UNLISTED PULMONARY SVC/PX: CPT

## 2024-11-04 PROCEDURE — B548ZZA ULTRASONOGRAPHY OF SUPERIOR VENA CAVA, GUIDANCE: ICD-10-PCS | Performed by: HOSPITALIST

## 2024-11-04 PROCEDURE — 80053 COMPREHEN METABOLIC PANEL: CPT

## 2024-11-04 PROCEDURE — 85007 BL SMEAR W/DIFF WBC COUNT: CPT | Performed by: INTERNAL MEDICINE

## 2024-11-04 PROCEDURE — 83735 ASSAY OF MAGNESIUM: CPT | Performed by: INTERNAL MEDICINE

## 2024-11-04 PROCEDURE — 85027 COMPLETE CBC AUTOMATED: CPT | Performed by: INTERNAL MEDICINE

## 2024-11-04 PROCEDURE — 02HV33Z INSERTION OF INFUSION DEVICE INTO SUPERIOR VENA CAVA, PERCUTANEOUS APPROACH: ICD-10-PCS | Performed by: HOSPITALIST

## 2024-11-04 PROCEDURE — 25010000002 FENTANYL CITRATE (PF) 50 MCG/ML SOLUTION

## 2024-11-04 PROCEDURE — 25010000002 POTASSIUM CHLORIDE 10 MEQ/100ML SOLUTION: Performed by: INTERNAL MEDICINE

## 2024-11-04 PROCEDURE — 25510000001 PERFLUTREN 6.52 MG/ML SUSPENSION 2 ML VIAL: Performed by: INTERNAL MEDICINE

## 2024-11-04 PROCEDURE — 82803 BLOOD GASES ANY COMBINATION: CPT | Performed by: INTERNAL MEDICINE

## 2024-11-04 PROCEDURE — 83605 ASSAY OF LACTIC ACID: CPT | Performed by: EMERGENCY MEDICINE

## 2024-11-04 RX ORDER — PANTOPRAZOLE SODIUM 40 MG/10ML
40 INJECTION, POWDER, LYOPHILIZED, FOR SOLUTION INTRAVENOUS
Status: DISCONTINUED | OUTPATIENT
Start: 2024-11-04 | End: 2024-11-07

## 2024-11-04 RX ORDER — FENTANYL CITRATE 50 UG/ML
100 INJECTION, SOLUTION INTRAMUSCULAR; INTRAVENOUS ONCE
Status: COMPLETED | OUTPATIENT
Start: 2024-11-04 | End: 2024-11-04

## 2024-11-04 RX ORDER — FENTANYL CITRATE 50 UG/ML
50 INJECTION, SOLUTION INTRAMUSCULAR; INTRAVENOUS ONCE
Status: COMPLETED | OUTPATIENT
Start: 2024-11-04 | End: 2024-11-04

## 2024-11-04 RX ORDER — FENTANYL CITRATE 50 UG/ML
50 INJECTION, SOLUTION INTRAMUSCULAR; INTRAVENOUS
Status: DISCONTINUED | OUTPATIENT
Start: 2024-11-04 | End: 2024-11-08

## 2024-11-04 RX ORDER — ESCITALOPRAM OXALATE 10 MG/1
10 TABLET ORAL DAILY
Status: DISCONTINUED | OUTPATIENT
Start: 2024-11-05 | End: 2024-11-07

## 2024-11-04 RX ORDER — ASPIRIN 81 MG/1
81 TABLET, CHEWABLE ORAL DAILY
Status: DISCONTINUED | OUTPATIENT
Start: 2024-11-05 | End: 2024-11-13

## 2024-11-04 RX ORDER — LANOLIN ALCOHOL/MO/W.PET/CERES
400 CREAM (GRAM) TOPICAL DAILY
Status: DISCONTINUED | OUTPATIENT
Start: 2024-11-05 | End: 2024-11-13

## 2024-11-04 RX ORDER — CHLORHEXIDINE GLUCONATE ORAL RINSE 1.2 MG/ML
15 SOLUTION DENTAL EVERY 12 HOURS SCHEDULED
Status: DISCONTINUED | OUTPATIENT
Start: 2024-11-04 | End: 2024-11-09

## 2024-11-04 RX ORDER — POTASSIUM CHLORIDE 7.45 MG/ML
10 INJECTION INTRAVENOUS
Status: COMPLETED | OUTPATIENT
Start: 2024-11-04 | End: 2024-11-04

## 2024-11-04 RX ORDER — FENTANYL CITRATE 50 UG/ML
25 INJECTION, SOLUTION INTRAMUSCULAR; INTRAVENOUS
Status: DISCONTINUED | OUTPATIENT
Start: 2024-11-04 | End: 2024-11-04

## 2024-11-04 RX ADMIN — FENTANYL CITRATE 25 MCG: 50 INJECTION, SOLUTION INTRAMUSCULAR; INTRAVENOUS at 10:20

## 2024-11-04 RX ADMIN — ASPIRIN 81 MG: 81 TABLET, COATED ORAL at 08:42

## 2024-11-04 RX ADMIN — ACETAMINOPHEN 325MG 650 MG: 325 TABLET ORAL at 19:11

## 2024-11-04 RX ADMIN — FENTANYL CITRATE 25 MCG: 50 INJECTION, SOLUTION INTRAMUSCULAR; INTRAVENOUS at 12:02

## 2024-11-04 RX ADMIN — ESCITALOPRAM OXALATE 10 MG: 10 TABLET, FILM COATED ORAL at 08:42

## 2024-11-04 RX ADMIN — PERFLUTREN 2 ML: 6.52 INJECTION, SUSPENSION INTRAVENOUS at 18:41

## 2024-11-04 RX ADMIN — FENTANYL CITRATE 25 MCG: 50 INJECTION, SOLUTION INTRAMUSCULAR; INTRAVENOUS at 14:40

## 2024-11-04 RX ADMIN — POTASSIUM CHLORIDE 10 MEQ: 7.46 INJECTION, SOLUTION INTRAVENOUS at 08:40

## 2024-11-04 RX ADMIN — FENTANYL CITRATE 25 MCG: 50 INJECTION, SOLUTION INTRAMUSCULAR; INTRAVENOUS at 20:48

## 2024-11-04 RX ADMIN — FENTANYL CITRATE 25 MCG: 50 INJECTION, SOLUTION INTRAMUSCULAR; INTRAVENOUS at 21:39

## 2024-11-04 RX ADMIN — PROPOFOL INJECTABLE EMULSION 50 MCG/KG/MIN: 10 INJECTION, EMULSION INTRAVENOUS at 23:07

## 2024-11-04 RX ADMIN — PANTOPRAZOLE SODIUM 40 MG: 40 INJECTION, POWDER, FOR SOLUTION INTRAVENOUS at 06:02

## 2024-11-04 RX ADMIN — FENTANYL CITRATE 50 MCG: 50 INJECTION, SOLUTION INTRAMUSCULAR; INTRAVENOUS at 23:26

## 2024-11-04 RX ADMIN — POTASSIUM CHLORIDE 10 MEQ: 7.46 INJECTION, SOLUTION INTRAVENOUS at 06:47

## 2024-11-04 RX ADMIN — Medication 3 MCG/KG/MIN: at 02:48

## 2024-11-04 RX ADMIN — FENTANYL CITRATE 25 MCG: 50 INJECTION, SOLUTION INTRAMUSCULAR; INTRAVENOUS at 09:27

## 2024-11-04 RX ADMIN — FENTANYL CITRATE 25 MCG: 50 INJECTION, SOLUTION INTRAMUSCULAR; INTRAVENOUS at 03:01

## 2024-11-04 RX ADMIN — SODIUM CHLORIDE 75 ML/HR: 9 INJECTION, SOLUTION INTRAVENOUS at 00:10

## 2024-11-04 RX ADMIN — PROPOFOL INJECTABLE EMULSION 40 MCG/KG/MIN: 10 INJECTION, EMULSION INTRAVENOUS at 10:37

## 2024-11-04 RX ADMIN — Medication 400 MCG: at 08:42

## 2024-11-04 RX ADMIN — CHLORHEXIDINE GLUCONATE 15 ML: 1.2 RINSE ORAL at 02:49

## 2024-11-04 RX ADMIN — FENTANYL CITRATE 25 MCG: 50 INJECTION, SOLUTION INTRAMUSCULAR; INTRAVENOUS at 16:34

## 2024-11-04 RX ADMIN — CHLORHEXIDINE GLUCONATE 15 ML: 1.2 RINSE ORAL at 08:42

## 2024-11-04 RX ADMIN — FENTANYL CITRATE 100 MCG: 50 INJECTION, SOLUTION INTRAMUSCULAR; INTRAVENOUS at 06:48

## 2024-11-04 RX ADMIN — Medication 10 ML: at 08:43

## 2024-11-04 RX ADMIN — FENTANYL CITRATE 25 MCG: 50 INJECTION, SOLUTION INTRAMUSCULAR; INTRAVENOUS at 04:58

## 2024-11-04 RX ADMIN — SODIUM BICARBONATE: 84 INJECTION, SOLUTION INTRAVENOUS at 23:27

## 2024-11-04 RX ADMIN — Medication 0.24 MCG/KG/MIN: at 13:22

## 2024-11-04 RX ADMIN — FENTANYL CITRATE 25 MCG: 50 INJECTION, SOLUTION INTRAMUSCULAR; INTRAVENOUS at 19:20

## 2024-11-04 RX ADMIN — FENTANYL CITRATE 25 MCG: 50 INJECTION, SOLUTION INTRAMUSCULAR; INTRAVENOUS at 01:27

## 2024-11-04 RX ADMIN — MUPIROCIN 1 APPLICATION: 20 OINTMENT TOPICAL at 08:42

## 2024-11-04 RX ADMIN — Medication 0.8 MCG/KG/MIN: at 23:27

## 2024-11-04 RX ADMIN — MUPIROCIN 1 APPLICATION: 20 OINTMENT TOPICAL at 21:13

## 2024-11-04 RX ADMIN — Medication 2.5 MCG/KG/MIN: at 10:37

## 2024-11-04 RX ADMIN — PROPOFOL INJECTABLE EMULSION 40 MCG/KG/MIN: 10 INJECTION, EMULSION INTRAVENOUS at 03:58

## 2024-11-04 RX ADMIN — FENTANYL CITRATE 50 MCG: 50 INJECTION, SOLUTION INTRAMUSCULAR; INTRAVENOUS at 22:17

## 2024-11-04 RX ADMIN — SODIUM BICARBONATE: 84 INJECTION, SOLUTION INTRAVENOUS at 10:10

## 2024-11-04 RX ADMIN — Medication 3 MCG/KG/MIN: at 06:02

## 2024-11-04 RX ADMIN — ACETAMINOPHEN 325 MG: 325 SUPPOSITORY RECTAL at 03:31

## 2024-11-04 RX ADMIN — Medication 10 ML: at 21:13

## 2024-11-04 RX ADMIN — PROPOFOL INJECTABLE EMULSION 40 MCG/KG/MIN: 10 INJECTION, EMULSION INTRAVENOUS at 18:20

## 2024-11-04 RX ADMIN — CHLORHEXIDINE GLUCONATE 15 ML: 1.2 RINSE ORAL at 21:13

## 2024-11-04 RX ADMIN — SODIUM BICARBONATE 50 MEQ: 84 INJECTION INTRAVENOUS at 14:38

## 2024-11-04 RX ADMIN — Medication 1.6 MCG/KG/MIN: at 16:24

## 2024-11-04 RX ADMIN — PROPOFOL INJECTABLE EMULSION 40 MCG/KG/MIN: 10 INJECTION, EMULSION INTRAVENOUS at 16:24

## 2024-11-04 RX ADMIN — FENTANYL CITRATE 25 MCG: 50 INJECTION, SOLUTION INTRAMUSCULAR; INTRAVENOUS at 18:28

## 2024-11-04 RX ADMIN — Medication 0.24 MCG/KG/MIN: at 20:39

## 2024-11-04 RX ADMIN — CEFTRIAXONE 2000 MG: 2 INJECTION, POWDER, FOR SOLUTION INTRAMUSCULAR; INTRAVENOUS at 17:53

## 2024-11-04 RX ADMIN — Medication 0.3 MCG/KG/MIN: at 03:58

## 2024-11-04 NOTE — PROCEDURES
Ultrasound Guided Central Venous Catheter Insertion Procedure Note      Indications:  vascular access    Procedure Details   Informed consent was obtained for the procedure, including sedation.  Risks of lung perforation, hemorrhage, arrhythmia, and adverse drug reaction were discussed.     Maximum sterile technique was used including usual patient drapes, antiseptics and physician sterile garments.    Under sterile conditions the skin above the on the left internal jugular vein was prepped with chlorhexidine and covered with a sterile drape. A sterile ultrasound probe was used to localize the target vein. It was clearly visualized. Local anesthesia was applied to the skin and subcutaneous tissues with lidocaine 1%. An 18-gauge needle was then inserted into the vein under ultrasound guidance. A guide wire was then threaded into the vein. A central venous catheter was then inserted into the vessel over the guide wire. The catheter was sutured into place and dressed following sterile protocol with Biopatch placed. All 4 ports were flushed and deemed patent.    Findings:  There were no changes to vital signs. All catheter ports were flushed with saline. Patient did tolerate procedure well.    Recommendations:  CXR ordered to verify placement.   Rene Catherine MD  [facility], [facility]  Pulmonary and Critical Care Medicine, Interventional Pulmonology

## 2024-11-04 NOTE — OP NOTE
CYSTOSCOPY URETERAL CATHETER/STENT INSERTION  Procedure Report    Patient Name:  Kristyn Lugo  YOB: 1940    Date of Surgery:  11/3/2024     Indications:    Righ tureter calculi   UTI/sepsis.       Pre-op Diagnosis:   Right ureter calculi   UTI/sepsis.        Post-op Diagnosis:  Post-Op Diagnosis Codes:   Same    Procedure/CPT® Codes:      Procedure(s):  CYSTOSCOPY URETERAL CATHETER/STENT INSERTION    Staff:  Surgeon(s):  Tony Ledesma MD         Anesthesia: General    Estimated Blood Loss: none    Implants:    Nothing was implanted during the procedure    Specimen:          None        Findings:   Right distal ureter calculi, sten tplacement with pyuria return.     Complications: none    Description of Procedure:   The patient was prepped in draped in lithotomy position  Cystoscopy was performed which revealed normal bladder.   The right UO was cannulated with Sensor wire.   A 7*26 stent was placed under fluoroscopic guidance  Good curls were noted in kidney and bladder.   Drainage of pyuria urine was noted.  A 16 Fr nj was placed.   The patient tolerated the procedure well and was taken to recovery in critical condition intubated due to sepsis.         Tony Ledesma MD     Date: 11/3/2024  Time: 22:22 EST

## 2024-11-04 NOTE — PROGRESS NOTES
"   LOS: 1 day   Patient Care Team:  Sean Fisher MD as PCP - General (Internal Medicine)      Subjective   Interval History: Currently having a central line placed. 2 pressors maxed out.    Objective     ROS   12 POINT NEG ROS PERTINENT IN HPI      Vital Signs  Temp:  [97.7 °F (36.5 °C)-102.9 °F (39.4 °C)] 98.6 °F (37 °C)  Heart Rate:  [] 88  Resp:  [16-36] 23  BP: ()/() 75/42  Arterial Line BP: ()/(43-85) 89/52  FiO2 (%):  [40 %-100 %] 40 %      Intake/Output Summary (Last 24 hours) at 11/4/2024 0747  Last data filed at 11/4/2024 0600  Gross per 24 hour   Intake 5314.16 ml   Output 140 ml   Net 5174.16 ml       Flowsheet Rows      Flowsheet Row First Filed Value   Admission Height 154.9 cm (60.98\") Documented at 11/03/2024 1429   Admission Weight 74.8 kg (164 lb 14.5 oz) Documented at 11/03/2024 1429            Physical Exam:     General appearance: intubated, sedated       Lab Results (all)       Procedure Component Value Units Date/Time    Blood Culture ID, PCR - Blood, Arm, Right [[id number]]  (Abnormal) Collected: 11/03/24 1644    Specimen: Blood from Arm, Right Updated: 11/04/24 0619     BCID, PCR Klebsiella pneumoniae group. Identification by BCID2 PCR.     BOTTLE TYPE Anaerobic Bottle    Narrative:      No resistance genes detected.    Magnesium [[id number]]  (Normal) Collected: 11/04/24 0526    Specimen: Blood Updated: 11/04/24 0605     Magnesium 2.0 mg/dL     Basic Metabolic Panel [[id number]]  (Abnormal) Collected: 11/04/24 0526    Specimen: Blood Updated: 11/04/24 0605     Glucose 126 mg/dL      BUN 36 mg/dL      Creatinine 2.41 mg/dL      Sodium 141 mmol/L      Potassium 2.9 mmol/L      Chloride 109 mmol/L      CO2 15.0 mmol/L      Calcium 7.9 mg/dL      BUN/Creatinine Ratio 14.9     Anion Gap 17.0 mmol/L      eGFR 19.3 mL/min/1.73     Narrative:      GFR Normal >60  Chronic Kidney Disease <60  Kidney Failure <15    The GFR formula is only valid for adults with stable renal " function between ages 18 and 70.    Phosphorus [[id number]]  (Normal) Collected: 11/04/24 0526    Specimen: Blood Updated: 11/04/24 0603     Phosphorus 3.2 mg/dL     CBC (No Diff) [[id number]]  (Abnormal) Collected: 11/04/24 0526    Specimen: Blood Updated: 11/04/24 0546     WBC 32.40 10*3/mm3      RBC 3.93 10*6/mm3      Hemoglobin 11.6 g/dL      Hematocrit 34.0 %      MCV 86.5 fL      MCH 29.5 pg      MCHC 34.1 g/dL      RDW 13.2 %      RDW-SD 40.9 fl      MPV 12.2 fL      Platelets 182 10*3/mm3     STAT Lactic Acid, Reflex [[id number]]  (Abnormal) Collected: 11/04/24 0349    Specimen: Blood Updated: 11/04/24 0521     Lactate 2.4 mmol/L     Blood Culture - Blood, Arm, Right [[id number]]  (Abnormal) Collected: 11/03/24 1644    Specimen: Blood from Arm, Right Updated: 11/04/24 0409     Blood Culture Abnormal Stain     Gram Stain Anaerobic Bottle Gram negative bacilli      Aerobic Bottle Gram negative bacilli    Narrative:      Less than seven (7) mL's of blood was collected.  Insufficient quantity may yield false negative results.    Blood Gas, Arterial -Obtain on: Current Settings [[id number]]  (Abnormal) Collected: 11/04/24 0248    Specimen: Arterial Blood Updated: 11/04/24 0254     Site Arterial Line     William's Test N/A     pH, Arterial 7.251 pH units      pCO2, Arterial 40.3 mm Hg      pO2, Arterial 91.5 mm Hg      HCO3, Arterial 17.7 mmol/L      Base Excess, Arterial -9.0 mmol/L      Comment: Serial Number: [id number]:  [id number]        O2 Saturation, Arterial 95.6 %      A-a DO2 0.4 mmHg      CO2 Content 19.0 mmol/L      Barometric Pressure for Blood Gas 750.8000 mmHg      Modality Adult Vent     FIO2 40 %      Ventilator Mode VC     Set Tidal Volume 400     Set Mech Resp Rate 20     Rate 22 Breaths/minute      PEEP 5     Notified Who Alena Hinds RN     Read Back Yes     Notified Time --     Hemodilution No     Device Comment drawn@0248 Sat 98%     PO2/FIO2 229    Blood Gas, Arterial - [[id number]]  (Abnormal)  Collected: 11/03/24 2248    Specimen: Arterial Blood Updated: 11/03/24 2253     Site Arterial Line     William's Test N/A     pH, Arterial 7.290 pH units      pCO2, Arterial 34.7 mm Hg      pO2, Arterial 298.5 mm Hg      HCO3, Arterial 16.7 mmol/L      Base Excess, Arterial -9.1 mmol/L      Comment: Serial Number: [id number]:  [id number]        O2 Saturation, Arterial 99.9 %      A-a DO2 0.4 mmHg      CO2 Content 17.7 mmol/L      Barometric Pressure for Blood Gas 749.7000 mmHg      Modality Adult Vent     FIO2 100 %      Ventilator Mode AC     Set Tidal Volume 400     Set Mech Resp Rate 14     Rate 36 Breaths/minute      PEEP 5     Notified Who Rosa King RN     Read Back Yes     Notified Time --     Hemodilution No     Device Comment sat 100%     PO2/FIO2 299    High Sensitivity Troponin T 2Hr [[id number]]  (Abnormal) Collected: 11/03/24 1800    Specimen: Blood Updated: 11/03/24 1913     HS Troponin T 230 ng/L      Troponin T Delta -44 ng/L     Narrative:      High Sensitive Troponin T Reference Range:  <14.0 ng/L- Negative Female for AMI  <22.0 ng/L- Negative Male for AMI  >=14 - Abnormal Female indicating possible myocardial injury.  >=22 - Abnormal Male indicating possible myocardial injury.   Clinicians would have to utilize clinical acumen, EKG, Troponin, and serial changes to determine if it is an Acute Myocardial Infarction or myocardial injury due to an underlying chronic condition.         STAT Lactic Acid, Reflex [[id number]]  (Abnormal) Collected: 11/03/24 1800    Specimen: Blood Updated: 11/03/24 1853     Lactate 4.3 mmol/L     Urinalysis, Microscopic Only - Indwelling Urethral Catheter [[id number]]  (Abnormal) Collected: 11/03/24 1745    Specimen: Urine from Indwelling Urethral Catheter Updated: 11/03/24 1816     RBC, UA 0-2 /HPF      WBC, UA 3-5 /HPF      Bacteria, UA None Seen /HPF      Squamous Epithelial Cells, UA 3-6 /HPF      Hyaline Casts, UA Too Numerous to Count /LPF      Methodology Automated  "Microscopy    Blood Culture - Blood, Arm, Right [[id number]] Collected: 11/03/24 1805    Specimen: Blood from Arm, Right Updated: 11/03/24 1811    Urinalysis With Microscopic If Indicated (No Culture) - Indwelling Urethral Catheter [[id number]]  (Abnormal) Collected: 11/03/24 1745    Specimen: Urine from Indwelling Urethral Catheter Updated: 11/03/24 1808     Color, UA Yellow     Appearance, UA Cloudy     pH, UA <=5.0     Specific Gravity, UA 1.014     Glucose, UA Negative     Ketones, UA Trace     Bilirubin, UA Negative     Blood, UA Negative     Protein, UA 30 mg/dL (1+)     Leuk Esterase, UA Trace     Nitrite, UA Negative     Urobilinogen, UA 0.2 E.U./dL    Procalcitonin [[id number]]  (Abnormal) Collected: 11/03/24 1546    Specimen: Blood Updated: 11/03/24 1749     Procalcitonin 93.60 ng/mL     Narrative:      As a Marker for Sepsis (Non-Neonates):    1. <0.5 ng/mL represents a low risk of severe sepsis and/or septic shock.  2. >2 ng/mL represents a high risk of severe sepsis and/or septic shock.    As a Marker for Lower Respiratory Tract Infections that require antibiotic therapy:    PCT on Admission    Antibiotic Therapy       6-12 Hrs later    >0.5                Strongly Recommended  >0.25 - <0.5        Recommended   0.1 - 0.25          Discouraged              Remeasure/reassess PCT  <0.1                Strongly Discouraged     Remeasure/reassess PCT    As 28 day mortality risk marker: \"Change in Procalcitonin Result\" (>80% or <=80%) if Day 0 (or Day 1) and Day 4 values are available. Refer to http://www.[employer]-pct-calculator.com    Change in PCT <=80%  A decrease of PCT levels below or equal to 80% defines a positive change in PCT test result representing a higher risk for 28-day all-cause mortality of patients diagnosed with severe sepsis for septic shock.    Change in PCT >80%  A decrease of PCT levels of more than 80% defines a negative change in PCT result representing a lower risk for 28-day all-cause " mortality of patients diagnosed with severe sepsis or septic shock.       Manual Differential [[id number]]  (Abnormal) Collected: 11/03/24 1546    Specimen: Blood Updated: 11/03/24 1654     Neutrophil % 92.0 %      Lymphocyte % 1.0 %      Monocyte % 5.0 %      Eosinophil % 0.0 %      Basophil % 0.0 %      Metamyelocyte % 2.0 %      Neutrophils Absolute 15.85 10*3/mm3      Lymphocytes Absolute 0.17 10*3/mm3      Monocytes Absolute 0.86 10*3/mm3      Eosinophils Absolute 0.00 10*3/mm3      Basophils Absolute 0.00 10*3/mm3      RBC Morphology Normal     WBC Morphology Normal     Platelet Morphology Normal    High Sensitivity Troponin T [[id number]]  (Abnormal) Collected: 11/03/24 1546    Specimen: Blood Updated: 11/03/24 1639     HS Troponin T 274 ng/L     Narrative:      High Sensitive Troponin T Reference Range:  <14.0 ng/L- Negative Female for AMI  <22.0 ng/L- Negative Male for AMI  >=14 - Abnormal Female indicating possible myocardial injury.  >=22 - Abnormal Male indicating possible myocardial injury.   Clinicians would have to utilize clinical acumen, EKG, Troponin, and serial changes to determine if it is an Acute Myocardial Infarction or myocardial injury due to an underlying chronic condition.         BNP [[id number]]  (Abnormal) Collected: 11/03/24 1546    Specimen: Blood Updated: 11/03/24 1634     proBNP 5,785.0 pg/mL     Narrative:      This assay is used as an aid in the diagnosis of individuals suspected of having heart failure. It can be used as an aid in the diagnosis of acute decompensated heart failure (ADHF) in patients presenting with signs and symptoms of ADHF to the emergency department (ED). In addition, NT-proBNP of <300 pg/mL indicates ADHF is not likely.    Age Range Result Interpretation  NT-proBNP Concentration (pg/mL:      <50             Positive            >450                   Gray                 300-450                    Negative             <300    50-75           Positive             >900                  Singh                300-900                  Negative            <300      >75             Positive            >1800                  Gray                300-1800                  Negative            <300    Protime-INR [[id number]]  (Abnormal) Collected: 11/03/24 1546    Specimen: Blood Updated: 11/03/24 1633     Protime 15.8 Seconds      INR 1.24    Comprehensive Metabolic Panel [[id number]]  (Abnormal) Collected: 11/03/24 1546    Specimen: Blood Updated: 11/03/24 1628     Glucose 97 mg/dL      BUN 30 mg/dL      Creatinine 2.17 mg/dL      Sodium 140 mmol/L      Potassium 3.6 mmol/L      Comment: Slight hemolysis detected by analyzer. Result may be falsely elevated.        Chloride 107 mmol/L      CO2 19.9 mmol/L      Calcium 8.4 mg/dL      Total Protein 4.9 g/dL      Albumin 3.1 g/dL      ALT (SGPT) 18 U/L      AST (SGOT) 33 U/L      Comment: Slight hemolysis detected by analyzer. Result may be falsely elevated.        Alkaline Phosphatase 53 U/L      Total Bilirubin 0.4 mg/dL      Globulin 1.8 gm/dL      A/G Ratio 1.7 g/dL      BUN/Creatinine Ratio 13.8     Anion Gap 13.1 mmol/L      eGFR 21.8 mL/min/1.73     Narrative:      GFR Normal >60  Chronic Kidney Disease <60  Kidney Failure <15    The GFR formula is only valid for adults with stable renal function between ages 18 and 70.    Magnesium [[id number]]  (Abnormal) Collected: 11/03/24 1546    Specimen: Blood Updated: 11/03/24 1628     Magnesium 1.5 mg/dL     Lactic Acid, Plasma [[id number]]  (Abnormal) Collected: 11/03/24 1546    Specimen: Blood Updated: 11/03/24 1626     Lactate 4.9 mmol/L     CBC & Differential [[id number]]  (Abnormal) Collected: 11/03/24 1546    Specimen: Blood Updated: 11/03/24 1604    Narrative:      The following orders were created for panel order CBC & Differential.  Procedure                               Abnormality         Status                     ---------                               -----------         ------                      CBC Auto Differential[[id number]]        Abnormal            Final result                 Please view results for these tests on the individual orders.    CBC Auto Differential [[id number]]  (Abnormal) Collected: 11/03/24 1546    Specimen: Blood Updated: 11/03/24 1604     WBC 17.23 10*3/mm3      RBC 3.64 10*6/mm3      Hemoglobin 10.9 g/dL      Hematocrit 32.3 %      MCV 88.7 fL      MCH 29.9 pg      MCHC 33.7 g/dL      RDW 13.3 %      RDW-SD 43.5 fl      MPV 11.9 fL      Platelets 158 10*3/mm3      nRBC 0.0 /100 WBC     Blood Gas, Arterial - [[id number]]  (Abnormal) Collected: 11/03/24 1558    Specimen: Arterial Blood Updated: 11/03/24 1603     Site Right Radial     William's Test Positive     pH, Arterial 7.396 pH units      pCO2, Arterial 29.6 mm Hg      pO2, Arterial 88.1 mm Hg      HCO3, Arterial 18.2 mmol/L      Base Excess, Arterial -5.9 mmol/L      Comment: Serial Number: [id number]:  [id number]        O2 Saturation, Arterial 96.9 %      CO2 Content 19.1 mmol/L      Barometric Pressure for Blood Gas 750.7000 mmHg      Modality Cannula     Flow Rate 3.0000 lpm      Rate 22 Breaths/minute      Hemodilution No     Device Comment sat 99%    POC Occult Blood Stool [[id number]]  (Abnormal) Collected: 11/03/24 1509    Specimen: Stool Updated: 11/03/24 1510     Fecal Occult Blood Positive     Lot Number [id number]     Expiration Date Right 28 2025     Positive Control Positive     Comment: Positive        Negative Control Negative            Imaging Results (All)       Procedure Component Value Units Date/Time    XR Chest 1 View [[id number]] Resulted: 11/04/24 0724     Updated: 11/04/24 0732    XR Chest 1 View [[id number]] Resulted: 11/04/24 0528     Updated: 11/04/24 0528    XR Chest 1 View [[id number]] Collected: 11/03/24 2300     Updated: 11/03/24 2300    Narrative:        Patient: MARILU COATES  Time Out: 22:59  Exam(s): XR CXR 1 VIEW     EXAM:    XR Chest, 1 View    CLINICAL HISTORY:     Reason for exam:  post op intubation.    TECHNIQUE:    Frontal view of the chest.    COMPARISON:    No relevant prior studies available.    FINDINGS:    Lungs:  Mild pulmonary vascular congestion.  No consolidation.    Pleural space:  Unremarkable.  No pneumothorax.    Heart:  Cardiomegaly.    Mediastinum:  Unremarkable.  Normal mediastinal contour.    Bones joints:  Unremarkable.  No acute fracture.    Tubes, lines and devices:  ET tube is noted with the tip approximately   3.5 cm from the anaya.    IMPRESSION:         Mild pulmonary vascular congestion.      Impression:          Electronically signed by Bulmaro Abraham MD on 11-03-24 at 2259    FL C Arm During Surgery [[id number]] Resulted: 11/03/24 2220     Updated: 11/03/24 2220    Narrative:      This procedure was auto-finalized with no dictation required.    CT Abdomen Pelvis Without Contrast [[id number]] Collected: 11/03/24 1710     Updated: 11/03/24 1710    Narrative:      CT ABDOMEN AND PELVIS WITHOUT IV CONTRAST     HISTORY: 84-year-old female with leukocytosis and hypotension. Vomiting  and diarrhea.     TECHNIQUE: Radiation dose reduction techniques were utilized, including  automated exposure control and exposure modulation based on body size.   3 mm images were obtained through the abdomen and pelvis without the  administration of IV contrast. No priors for comparison.     FINDINGS:  1. There is a 5 mm stone within the distal right ureter just proximal to  the UVJ with mild-moderate right hydroureteronephrosis. There is  moderate bilateral perinephric stranding. There are no left renal or  ureteral stones and there is no hydronephrosis on the left. The  perinephric stranding may be secondary to acute UTI pyelonephritis.  Urinary bladder is collapsed. Please correlate clinically.     Many phleboliths are noted. There is a 5 cm right renal cyst.     2. Noncontrasted liver, gallbladder, spleen, pancreas, and right adrenal  appear unremarkable. There is nodular hyperplasia of the  left adrenal  gland.     3. There is no acute bowel abnormality. There is no evidence for colitis  or appendicitis. Noncontrasted uterus and adnexa appear unremarkable.  There are moderate abdominal aortic atherosclerotic changes without  aneurysmal dilatation.     4. There is pulmonary vascular congestion and dependent atelectatic  changes at the lung bases.       XR Chest 1 View [[id number]] Collected: 11/03/24 1532     Updated: 11/03/24 1536    Narrative:      XR CHEST 1 VW-     HISTORY: Female who is 84 years-old, hypoxia     TECHNIQUE: Frontal view of the chest     COMPARISON: None available     FINDINGS: The heart is enlarged. Aorta is tortuous, calcified. Pulmonary  vasculature is unremarkable. No focal pulmonary consolidation, pleural  effusion, or pneumothorax. No acute osseous process.       Impression:      No focal pulmonary consolidation. Cardiomegaly. Follow-up as  clinical indications persist.     This report was finalized on 11/3/2024 3:33 PM by Dr. Jose Hardy M.D on Workstation: [employer]               Medication Review:   Current Facility-Administered Medications   Medication Dose Route Frequency Provider Last Rate Last Admin    acetaminophen (TYLENOL) tablet 650 mg  650 mg Oral Q4H PRN Juan Antonio Lee MD        Or    acetaminophen (TYLENOL) suppository 325 mg  325 mg Rectal Q4H PRN Juan Antonio Lee MD   325 mg at 11/04/24 0331    aspirin EC tablet 81 mg  81 mg Oral Daily Juan Antonio Lee MD        cefTRIAXone (ROCEPHIN) 2,000 mg in sodium chloride 0.9 % 100 mL MBP  2,000 mg Intravenous Q24H Juan Antonio Lee MD        chlorhexidine (PERIDEX) 0.12 % solution 15 mL  15 mL Mouth/Throat Q12H Joyce Jauregui APRN   15 mL at 11/04/24 0249    escitalopram (LEXAPRO) tablet 10 mg  10 mg Oral Daily Juan Antonio Lee MD        fentaNYL citrate (PF) (SUBLIMAZE) injection 25 mcg  25 mcg Intravenous Q1H PRN Joyce Jauregui APRN   25 mcg at 11/04/24 0458    folic acid (FOLVITE) tablet 400 mcg  400 mcg Oral  Daily Juan Antonio Lee MD        mupirocin (BACTROBAN) 2 % nasal ointment 1 Application  1 Application Each Nare BID Juan Antonio Lee MD   1 Application at 11/03/24 2018    nitroglycerin (NITROSTAT) SL tablet 0.4 mg  0.4 mg Sublingual Q5 Min PRN Juan Antonio Lee MD        norepinephrine (LEVOPHED) 8 mg in 250 mL NS infusion (premix)  0.02-0.3 mcg/kg/min Intravenous Titrated Joyce Jauregui APRN 34.7 mL/hr at 11/04/24 0730 0.24 mcg/kg/min at 11/04/24 0730    pantoprazole (PROTONIX) injection 40 mg  40 mg Intravenous Q AM Joyce Jauregui APRN   40 mg at 11/04/24 0602    phenylephrine (PETAR-SYNEPHRINE) 50 mg in 250 mL NS infusion  0.5-3 mcg/kg/min Intravenous Titrated Ayan Yee MD 69.3 mL/hr at 11/04/24 0602 3 mcg/kg/min at 11/04/24 0602    potassium chloride 10 mEq in 100 mL IVPB  10 mEq Intravenous Q1H Carole Montoya  mL/hr at 11/04/24 0647 10 mEq at 11/04/24 0647    propofol (DIPRIVAN) infusion 10 mg/mL 100 mL  5-50 mcg/kg/min Intravenous Titrated Ayan Yee MD 18.48 mL/hr at 11/04/24 0358 40 mcg/kg/min at 11/04/24 0358    sodium chloride 0.9 % flush 10 mL  10 mL Intravenous Q12H Juan Antonio Lee MD   10 mL at 11/03/24 2018    sodium chloride 0.9 % flush 10 mL  10 mL Intravenous PRN Juan Antonio Lee MD        sodium chloride 0.9 % infusion 40 mL  40 mL Intravenous PRN Juan Antonio Lee MD           Current Facility-Administered Medications:     acetaminophen (TYLENOL) tablet 650 mg, 650 mg, Oral, Q4H PRN **OR** acetaminophen (TYLENOL) suppository 325 mg, 325 mg, Rectal, Q4H PRN, Juan Antonio Lee MD, 325 mg at 11/04/24 0331    aspirin EC tablet 81 mg, 81 mg, Oral, Daily, Juan Antonio Lee MD    cefTRIAXone (ROCEPHIN) 2,000 mg in sodium chloride 0.9 % 100 mL MBP, 2,000 mg, Intravenous, Q24H, Juan Antonio Lee MD    chlorhexidine (PERIDEX) 0.12 % solution 15 mL, 15 mL, Mouth/Throat, Q12H, Joyce Jauregui, APRN, 15 mL at 11/04/24 0249    escitalopram (LEXAPRO) tablet 10 mg, 10 mg, Oral, Daily, Juan Antonio Lee  MD GARCIA    fentaNYL citrate (PF) (SUBLIMAZE) injection 25 mcg, 25 mcg, Intravenous, Q1H PRN, Joyce Jauregui APRN, 25 mcg at 11/04/24 0458    folic acid (FOLVITE) tablet 400 mcg, 400 mcg, Oral, Daily, Juan Antonio Lee MD    mupirocin (BACTROBAN) 2 % nasal ointment 1 Application, 1 Application, Each Nare, BID, Juan Antonio Lee MD, 1 Application at 11/03/24 2018    nitroglycerin (NITROSTAT) SL tablet 0.4 mg, 0.4 mg, Sublingual, Q5 Min PRN, Juan Antonio Lee MD    norepinephrine (LEVOPHED) 8 mg in 250 mL NS infusion (premix), 0.02-0.3 mcg/kg/min, Intravenous, Titrated, Joyce Jauregui APRN, Last Rate: 34.7 mL/hr at 11/04/24 0730, 0.24 mcg/kg/min at 11/04/24 0730    pantoprazole (PROTONIX) injection 40 mg, 40 mg, Intravenous, Q AM, Joyce Jauregui APRN, 40 mg at 11/04/24 0602    phenylephrine (PETAR-SYNEPHRINE) 50 mg in 250 mL NS infusion, 0.5-3 mcg/kg/min, Intravenous, Titrated, Ayan Yee MD, Last Rate: 69.3 mL/hr at 11/04/24 0602, 3 mcg/kg/min at 11/04/24 0602    potassium chloride 10 mEq in 100 mL IVPB, 10 mEq, Intravenous, Q1H, Carole Montoya MD, Last Rate: 100 mL/hr at 11/04/24 0647, 10 mEq at 11/04/24 0647    propofol (DIPRIVAN) infusion 10 mg/mL 100 mL, 5-50 mcg/kg/min, Intravenous, Titrated, Ayan Yee MD, Last Rate: 18.48 mL/hr at 11/04/24 0358, 40 mcg/kg/min at 11/04/24 0358    sodium chloride 0.9 % flush 10 mL, 10 mL, Intravenous, Q12H, Juan Antonio Lee MD, 10 mL at 11/03/24 2018    sodium chloride 0.9 % flush 10 mL, 10 mL, Intravenous, PRNIC, Juan Antonio Lee MD    sodium chloride 0.9 % infusion 40 mL, 40 mL, Intravenous, PRNIC, Juan Antonio Lee MD  Medications Discontinued During This Encounter   Medication Reason    fentaNYL citrate (PF) (SUBLIMAZE) injection 25 mcg     sodium chloride 0.9 % infusion        Assessment & Plan   Urosepsis  Ureteral stone      Acute pyelonephritis        Plan   Remains critically ill  - continue ceftriaxone while awaiting culture data  - we will continue to follow to  stability    Anish Harris Jr., MD  11/04/24  07:47 EST

## 2024-11-04 NOTE — PROGRESS NOTES
"  Daily Progress Note.   [facility] CORONARY CARE  11/4/2024    Patient:  Name:  Kristyn Lugo  MRN:  [id number]  1940  84 y.o.  female         CC: septic shock resp failure on mech vent with pyelonephritis throughout  Summary:  Per Dr Cathreine:  \"84-year-old female who presented with sepsis and found to be in septic shock secondary to acute pyelonephritis and underwent cystoscopy with stent placement for ureteral calculi. Came back to the ICU intubated requiring vasopressor support. Continue antibiotics, Klebsiella noted on blood cultures by PCR. MAP goal greater than 65. Daily sedation holidays, daily ABGs. \"    Interval History:    Admitted overnight with sepsis on mechanical ventilation with respiratory failure on mech vent and vasopressors unable to give any reliable history      Physical Exam:  BP (!) 75/42   Pulse 82   Temp 99 °F (37.2 °C)   Resp 23   Ht 154.9 cm (61\")   Wt 78.4 kg (172 lb 13.5 oz)   SpO2 98%   BMI 32.66 kg/m²   Body mass index is 32.66 kg/m².    Intake/Output Summary (Last 24 hours) at 11/4/2024 1427  Last data filed at 11/4/2024 1200  Gross per 24 hour   Intake 4864.16 ml   Output 350 ml   Net 4514.16 ml   General appearance: ill on mech vent  Eyes: anicteric sclerae,  ;    HENT: Atraumatic; oropharynx +ET tube LIJ CVC  Lungs: javier mech synchronous,   CV: RRR, no rub   Abdomen: obese soft non rigid  Extremities: mild  peripheral edema   Skin: WWP no diffuse visible rash  Psych/neuro sedated on mech vent    Data Review:  Notable Labs:  Results from last 7 days   Lab Units 11/04/24  1203 11/04/24  0526 11/03/24  1546   WBC 10*3/mm3 35.25* 32.40* 17.23*   HEMOGLOBIN g/dL 11.7* 11.6* 10.9*   PLATELETS 10*3/mm3 156 182 158     Results from last 7 days   Lab Units 11/04/24  1203 11/04/24  0526 11/03/24  1546   SODIUM mmol/L 139 141 140   POTASSIUM mmol/L 3.8 2.9* 3.6   CHLORIDE mmol/L 109* 109* 107   CO2 mmol/L 15.5* 15.0* 19.9*   BUN mg/dL 37* 36* 30* "   CREATININE mg/dL 2.54* 2.41* 2.17*   GLUCOSE mg/dL 136* 126* 97   CALCIUM mg/dL 7.6* 7.9* 8.4*   MAGNESIUM mg/dL  --  2.0 1.5*   PHOSPHORUS mg/dL  --  3.2  --    Estimated Creatinine Clearance: 15.3 mL/min (A) (by C-G formula based on SCr of 2.54 mg/dL (H)).    Results from last 7 days   Lab Units 11/04/24  1203 11/04/24  0526 11/04/24  0349 11/03/24  1800 11/03/24  1546   AST (SGOT) U/L 42*  --   --   --  33*   ALT (SGPT) U/L 22  --   --   --  18   PROCALCITONIN ng/mL  --   --   --   --  93.60*   LACTATE mmol/L 2.2*  --  2.4* 4.3* 4.9*   PLATELETS 10*3/mm3 156 182  --   --  158       Results from last 7 days   Lab Units 11/04/24  1207 11/04/24  0248 11/03/24  2248 11/03/24  1558   PH, ARTERIAL pH units 7.285* 7.251* 7.290* 7.396   PCO2, ARTERIAL mm Hg 36.8 40.3 34.7* 29.6*   PO2 ART mm Hg 107.3* 91.5 298.5* 88.1   HCO3 ART mmol/L 17.5* 17.7* 16.7* 18.2*       Imaging:  Reviewed chest images personally from past 3 days    ASSESSMENT  /  PLAN:  Right hydronephrosis with nephrolithiasis  Right distal ureteral calculi s/p cysto with stent insertion 11/3/24  EMMA  Sepsis vs hypovolemic shock  Hypotension  Elevated troponin  Elevated proBNP  Acute hypoxemic respiratory failure  Valvular heart disease  Chronic diastolic heart failure  Anemia  Chronic constipation vs diarrhea  Hx of HTN  Aneurysm of ascending aorta  HLD    Continue vasopressors wean as able  Continue ceftriaxone  Follow blood cultures  Continuous IV fluids  Bicarb drip    Mechanical ventilation reviewed chest x-ray reviewed.  Adjustments as appropriate.    Sig cardiac history trop elevated suspect in light of sepsis, type ii but will get cards opinion.  Discussed with bedside nursing.    Appreciate consultants.  Notes reviewed    No family at bedside      Total critical care time was 45 minutes, excluding any separately billable procedure time.  Time did not overlap with any other provider.      Electronically signed by Neymar Gerber MD, 11/04/24,  2:27 PM [facility] Pulmonary [facility] [place]

## 2024-11-04 NOTE — ANESTHESIA POSTPROCEDURE EVALUATION
Patient: Kristyn Lugo    Procedure Summary       Date: 11/03/24 Room / Location: [facility] OR 01 / [facility] MAIN OR    Anesthesia Start: 2142 Anesthesia Stop: 2222    Procedure: CYSTOSCOPY URETERAL CATHETER/STENT INSERTION (Right) Diagnosis:     Surgeons: Tony Ledesma MD Provider: Ayan Yee MD    Anesthesia Type: general ASA Status: 4 - Emergent            Anesthesia Type: general    Vitals  Vitals Value Taken Time   BP 76/54 11/03/24 2245   Temp 39.1 °C (102.3 °F) 11/03/24 2221   Pulse 121 11/03/24 2259   Resp 31 11/03/24 2223   SpO2 97 % 11/03/24 2257   Vitals shown include unfiled device data.        Post Anesthesia Care and Evaluation    Patient location during evaluation: PACU  Patient participation: complete - patient cannot participate  Level of consciousness: responsive to painful stimuli  Pain management: adequate    Airway patency: patent  Anesthetic complications: No anesthetic complications  PONV Status: controlled  Cardiovascular status: acceptable  Respiratory status: acceptable  Hydration status: acceptable    Comments: Pt brought to PACU intubated due to concern for respiratory failure due to metabolic acidosis and preop hypoxia/suspicion for pulmonary edema. Initially hypotensive on arrival, but improved w/ phenylephrine bolus + infusion. Remains intubated and sedated for now.

## 2024-11-04 NOTE — ANESTHESIA PREPROCEDURE EVALUATION
Anesthesia Evaluation     Patient summary reviewed and Nursing notes reviewed   no history of anesthetic complications:   NPO Solid Status: > 8 hours  NPO Liquid Status: > 2 hours           Airway   Mallampati: II  TM distance: >3 FB  Neck ROM: full  Dental      Pulmonary      ROS comment: Patient on 4L NC  PE comment: Tachypneic on exam   Cardiovascular     ECG reviewed    (+) valvular problems/murmurs AI    ROS comment: ECHO (09/06/2023) demonstrating a left ventricular EF 60-65% with moderate AI, mild MR, and mild TR    Elevated Troponin    Patient tachycardic to 130s-140s appears to be sinus tachycardia. EKG unhelpful given artifact     Neuro/Psych  GI/Hepatic/Renal/Endo    (+) obesity, renal disease- CRI    ROS Comment: Urosepsis, elevated lactate    EMMA    Musculoskeletal     Abdominal    Substance History      OB/GYN          Other   blood dyscrasia anemia,                 Anesthesia Plan    ASA 4 - emergent     general     intravenous induction     Anesthetic plan, risks, benefits, and alternatives have been provided, discussed and informed consent has been obtained with: patient.    CODE STATUS:

## 2024-11-04 NOTE — CONSULTS
Kristyn Lugo  [id number]    Urology Consult note    Referring provider: Juan Antonio Lee MD    CC/reason for consult:   Ureter calcuyli     HPI:   5mm distal right ureter calculi and sepsis   CT with 5mm distal ureter calculi   Hypotension.         Past Medical History:   Diagnosis Date    Aortic valve regurgitation     Chronic kidney disease     Low back pain        History reviewed. No pertinent surgical history.    No current facility-administered medications on file prior to encounter.     Current Outpatient Medications on File Prior to Encounter   Medication Sig Dispense Refill    acetaminophen (TYLENOL) 500 MG tablet Take 1 tablet by mouth.      aspirin 81 MG EC tablet Take 1 tablet by mouth Daily.      cholecalciferol (Vitamin D, Cholecalciferol,) 25 MCG (1000 UT) tablet Take 1 tablet by mouth Daily.      Docusate Calcium (STOOL SOFTENER PO) Take 1 tablet by mouth Daily.      folic acid (FOLVITE) 400 MCG tablet Take 1 tablet by mouth Daily.      metoprolol-hydrochlorothiazide (LOPRESSOR HCT) 100-25 MG per tablet Take 1 tablet by mouth Daily.      multivitamin with minerals (CENTRUM SILVER PO) Take 1 tablet by mouth Daily.      naproxen sodium (ALEVE) 220 MG tablet Take 1 tablet by mouth Daily As Needed.      Omega-3 Fatty Acids (fish oil) 1200 MG capsule capsule Take 1 capsule by mouth Daily With Breakfast.      simvastatin (ZOCOR) 40 MG tablet Take 1 tablet by mouth Daily.      escitalopram (LEXAPRO) 10 MG tablet Take 1 tablet by mouth Daily. (Patient not taking: Reported on 11/3/2024)         Current Facility-Administered Medications   Medication Dose Route Frequency Provider Last Rate Last Admin    acetaminophen (TYLENOL) tablet 650 mg  650 mg Oral Q4H PRN Juan Antonio Lee MD        Or    acetaminophen (TYLENOL) suppository 325 mg  325 mg Rectal Q4H PRN Juan Antonio Lee MD        aspirin EC tablet 81 mg  81 mg Oral Daily Juan Antonio Lee MD        [START ON 11/4/2024] cefTRIAXone (ROCEPHIN) 2,000  mg in sodium chloride 0.9 % 100 mL MBP  2,000 mg Intravenous Q24H Juan Antonio Lee MD        escitalopram (LEXAPRO) tablet 10 mg  10 mg Oral Daily Juan Antonio Lee MD        folic acid (FOLVITE) tablet 400 mcg  400 mcg Oral Daily Juan Antonio Lee MD        mupirocin (BACTROBAN) 2 % nasal ointment 1 Application  1 Application Each Nare BID Juan Antonio Lee MD   1 Application at 11/03/24 2018    nitroglycerin (NITROSTAT) SL tablet 0.4 mg  0.4 mg Sublingual Q5 Min PRN Juan Antonio Lee MD        sodium chloride 0.9 % flush 10 mL  10 mL Intravenous Q12H uJan Antonio Lee MD   10 mL at 11/03/24 2018    sodium chloride 0.9 % flush 10 mL  10 mL Intravenous PRN Juan Antonio Lee MD        sodium chloride 0.9 % infusion 40 mL  40 mL Intravenous PRN Juan Antonio Lee MD        sodium chloride 0.9 % infusion  75 mL/hr Intravenous Continuous Juan Antonio Lee MD 75 mL/hr at 11/03/24 2018 75 mL/hr at 11/03/24 2018       Allergies   Allergen Reactions    Fenofibrate Unknown - High Severity     Pt is unaware    Penicillins Unknown - High Severity       Social History     Socioeconomic History    Marital status:    Tobacco Use    Smoking status: Former     Types: Cigarettes    Smokeless tobacco: Never   Substance and Sexual Activity    Drug use: Never    Sexual activity: Defer       History reviewed. No pertinent family history.      Acutely ill   Tachy   Hypotensions    Labs and imaging reviewed  Pertinent in HPI    Assessment:  5mm distal right ureter calculi and sepsis   CT with 5mm distal ureter calculi   Hypotension.     PLAN:   Cysto right ureter calculi.   Risks, benefits, complication, alternatives discussed but not limited to the following: bleeding, infection, injury to surrounding, need for secondary-staged procedure.   The pt wishes to proceed.       Tony Ledesma MD   [facility]rology  [phone]

## 2024-11-04 NOTE — ANESTHESIA PROCEDURE NOTES
Arterial Line      Patient reassessed immediately prior to procedure    Patient location during procedure: pre-op  Start time: 11/3/2024 9:20 AM  Stop Time:11/3/2024 9:25 AM       Line placed for hemodynamic monitoring and ABGs/Labs/ISTAT.  Performed By   Anesthesiologist: Yann Lee MD   Preanesthetic Checklist  Completed: patient identified, IV checked, site marked, risks and benefits discussed, surgical consent, monitors and equipment checked, pre-op evaluation and timeout performed  Arterial Line Prep    Sterile Tech: gloves, mask, cap and sterile barriers  Prep: ChloraPrep  Patient monitoring: blood pressure monitoring, continuous pulse oximetry and EKG  Arterial Line Procedure   Laterality:right  Location:  radial artery  Catheter size: 20 G   Guidance: ultrasound guided  PROCEDURE NOTE/ULTRASOUND INTERPRETATION.  Using ultrasound guidance the potential vascular sites for insertion of the catheter were visualized to determine the patency of the vessel to be used for vascular access.  After selecting the appropriate site for insertion, the needle was visualized under ultrasound being inserted into the radial artery, followed by ultrasound confirmation of wire and catheter placement. There were no abnormalities seen on ultrasound; an image was taken; and the patient tolerated the procedure with no complications.   Number of attempts: 1  Successful placement: yes Images: still images not obtained  Post Assessment   Dressing Type: occlusive dressing applied, secured with tape and wrist guard applied.   Complications no  Circ/Move/Sens Assessment: unchanged.   Patient Tolerance: patient tolerated the procedure well with no apparent complications  Additional Notes  Ultrasound guidance used to visualize radial artery anatomy, guide needle placement and verify catheter placement

## 2024-11-04 NOTE — CONSULTS
Date of Consultation: 24    Referral Provider: Neymar Gerber MD.    Reason for Consultation: Elevated troponin.    Encounter Provider: Be Estrada MD    Group of Service: [facility]     Patient Name: Kristyn Lugo    :1940    Chief complaint: Diarrhea, malaise.    History of Present Illness:      This is an 84 year-old female with a past medical history significant for aortic valve regurgitation, chronic kidney disease, and chronic diastolic heart failure.  She follows in the office with Dr. Walsh at [facility].  She has a known history of ascending aortic aneurysm and aortic insufficiency.    Her last office visit with Dr. Walsh was on 2024.  At that time she was not having any cardiac symptoms.  He has a known history of aortic aneurysm, and a CT angiogram of the chest on 10/30/2024 showed the ascending aorta to measure 4.8 cm.  She also had an echocardiogram on 10/30/2024 which showed a normal ejection fraction of 60 to 65%, grade 2 diastolic dysfunction, and moderate to severe aortic insufficiency.    She presented to the emergency department on 11/3/2024 with complaints of diarrhea and generalized malaise.  She was found to be hypotensive, and has been diagnosed with septic shock.  She has pyelonephritis, and was found to have hydronephrosis with a right ureteral stone.  She underwent urgent cystoscopy with stent insertion on 11/3/2024.  She is now on José Miguel-Synephrine and Levophed.  Her chest x-ray did show pulmonary vascular congestion     She remains intubated and sedated.  Her troponin was elevated at 274, then 238, and then 268.  This is in the setting of a creatinine of 2.54 and BUN of 37.  She does not have a history of coronary artery disease.  Her daughter was at bedside.      Past Medical History:   Diagnosis Date    Aortic valve regurgitation     Chronic kidney disease     Low back pain          Past Surgical History:   Procedure Laterality  Date    CYSTOSCOPY W/ URETERAL STENT PLACEMENT Right 11/3/2024    Procedure: CYSTOSCOPY URETERAL CATHETER/STENT INSERTION;  Surgeon: Tony Ledesma MD;  Location: [facility];  Service: Urology;  Laterality: Right;         Allergies   Allergen Reactions    Fenofibrate Unknown - High Severity     Pt is unaware    Penicillins Unknown - High Severity         No current facility-administered medications on file prior to encounter.     Current Outpatient Medications on File Prior to Encounter   Medication Sig Dispense Refill    acetaminophen (TYLENOL) 500 MG tablet Take 1 tablet by mouth.      aspirin 81 MG EC tablet Take 1 tablet by mouth Daily.      cholecalciferol (Vitamin D, Cholecalciferol,) 25 MCG (1000 UT) tablet Take 1 tablet by mouth Daily.      Docusate Calcium (STOOL SOFTENER PO) Take 1 tablet by mouth Daily.      folic acid (FOLVITE) 400 MCG tablet Take 1 tablet by mouth Daily.      metoprolol-hydrochlorothiazide (LOPRESSOR HCT) 100-25 MG per tablet Take 1 tablet by mouth Daily.      multivitamin with minerals (CENTRUM SILVER PO) Take 1 tablet by mouth Daily.      naproxen sodium (ALEVE) 220 MG tablet Take 1 tablet by mouth Daily As Needed.      Omega-3 Fatty Acids (fish oil) 1200 MG capsule capsule Take 1 capsule by mouth Daily With Breakfast.      simvastatin (ZOCOR) 40 MG tablet Take 1 tablet by mouth Daily.      escitalopram (LEXAPRO) 10 MG tablet Take 1 tablet by mouth Daily. (Patient not taking: Reported on 11/3/2024)           Social History     Socioeconomic History    Marital status:    Tobacco Use    Smoking status: Former     Types: Cigarettes    Smokeless tobacco: Never   Substance and Sexual Activity    Drug use: Never    Sexual activity: Defer         History reviewed. No pertinent family history.    REVIEW OF SYSTEMS:   The patient is intubated and sedated, and a review of systems is not possible.     Objective:     Vitals:    11/04/24 1015 11/04/24 1030 11/04/24 1109 11/04/24 1537  "  BP:       BP Location:       Patient Position:       Pulse: 97 84 82 81   Resp:   23 26   Temp: 99 °F (37.2 °C) 99 °F (37.2 °C) 99 °F (37.2 °C) 99.1 °F (37.3 °C)   TempSrc:       SpO2: 96% 98% 98% 99%   Weight:       Height:         Body mass index is 32.66 kg/m².  Flowsheet Rows      Flowsheet Row First Filed Value   Admission Height 154.9 cm (60.98\") Documented at 11/03/2024 1429   Admission Weight 74.8 kg (164 lb 14.5 oz) Documented at 11/03/2024 1429             General:    Intubated and sedated.                   Head:    Normocephalic, atraumatic.   Eyes:          Conjunctivae and sclerae normal, no icterus.   Throat:   ET tube in place.   Neck:   Supple, trachea midline.   Lungs:     Scattered rhonchi bilaterally.    Heart:    Regular rhythm and normal rate.  II/VI SM throughout.   Abdomen:     Soft, non-tender, non-distended, positive bowel sounds.    Extremities:   No clubbing, cyanosis, or edema.     Pulses:   Pulses palpable and equal bilaterally.    Skin:   No bleeding or rash.   Neuro:   Non-focal.  Moves all extremities well.    Psychiatric:   Intubated and sedated.         Lab Review:                Results from last 7 days   Lab Units 11/04/24  1203   SODIUM mmol/L 139   POTASSIUM mmol/L 3.8   CHLORIDE mmol/L 109*   CO2 mmol/L 15.5*   BUN mg/dL 37*   CREATININE mg/dL 2.54*   GLUCOSE mg/dL 136*   CALCIUM mg/dL 7.6*     Results from last 7 days   Lab Units 11/04/24  1203 11/03/24  1800 11/03/24  1546   HSTROP T ng/L 268* 230* 274*     Results from last 7 days   Lab Units 11/04/24  1203   WBC 10*3/mm3 35.25*   HEMOGLOBIN g/dL 11.7*   HEMATOCRIT % 34.2   PLATELETS 10*3/mm3 156     Results from last 7 days   Lab Units 11/03/24  1546   INR  1.24*         Results from last 7 days   Lab Units 11/04/24  0526   MAGNESIUM mg/dL 2.0           EKG (reviewed by me personally):              Assessment:   1.  Klebsiella bacteremia and septic shock  2.  Acute pyelonephritis  3.  Right ureteral calculi with " obstruction hydronephrosis, status post cystoscopy and stent placement on 11/3/2024  4.  Acute kidney injury with stage III chronic kidney disease  5.  Acute hypoxic respiratory failure  6.  Type II NSTEMI secondary to #1, #2, and #4.  7.  Chronic diastolic CHF  8.  Moderate to severe aortic insufficiency by echo on 10/30/2024  9.  Ascending aortic aneurysm, 4.8 cm by CTA on 10/30/2024  10.  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  She is on Levophed and José Miguel-Synephrine currently for pressor support.  She is getting gentle hydration because of her renal dysfunction.  Her most recent chest x-ray did not show evidence of congestive heart failure.  I would continue to gently hydrate her as per nephrology.    I strongly suspect the troponin elevation is a type II NSTEMI secondary to the septic shock, pyelonephritis, and acute kidney injury.  The pattern would be consistent with ACS.  She does not have ischemic changes.  She does have a left anterior fascicular block on the EKG.    I am going to recheck an echocardiogram, mainly to reevaluate her ejection fraction and to ensure that she has not developed congestive heart failure or wall motion abnormalities with her current issues.  She is on aspirin 81 mg/day.  She obviously cannot be on beta-blockers because of her blood pressure.  She does have a history of moderate to severe aortic insufficiency on her last echo on 10/30/2024, although she has a history of at least moderate aortic insufficiency for several years prior to this.  Her ejection fraction was normal at the time.    I spoke to her daughter in detail at bedside.    Cardiology will follow.  Thank you very much for this consult.    Jacek Estrada MD

## 2024-11-04 NOTE — CASE MANAGEMENT/SOCIAL WORK
Continued Stay Note  [facility]     Patient Name: Kristyn Lugo  MRN: [id number]  Today's Date: 11/4/2024    Admit Date: 11/3/2024    Plan: Pending clinical progress.Currently on vent.   Discharge Plan       Row Name 11/04/24 1346       Plan    Plan Pending clinical progress.Currently on vent.    Plan Comments Patient is currently on vent, IV abx, bicarb gtt, and possible GI bleed per rounding.                        Nohemi Romo RN

## 2024-11-04 NOTE — ANESTHESIA PROCEDURE NOTES
Airway  Urgency: elective    Date/Time: 11/3/2024 9:55 PM  Airway not difficult    General Information and Staff    Patient location during procedure: OR    Indications and Patient Condition  Indications for airway management: airway protection    Preoxygenated: yes  Mask difficulty assessment: 0 - not attempted    Final Airway Details  Final airway type: endotracheal airway      Successful airway: ETT and Microcuff Subglottic Suctioning ETT  Cuffed: yes   Successful intubation technique: direct laryngoscopy and RSI  Facilitating devices/methods: cricoid pressure and intubating stylet  Endotracheal tube insertion site: oral  Blade: Sam  Blade size: 3  ETT size (mm): 7.0  Cormack-Lehane Classification: grade I - full view of glottis  Placement verified by: chest auscultation and capnometry   Measured from: lips  ETT/EBT  to lips (cm): 21  Number of attempts at approach: 1  Assessment: lips, teeth, and gum same as pre-op and atraumatic intubation

## 2024-11-04 NOTE — H&P
Group: [facility]         History and Physical    Patient Identification:  Kristyn Lugo  84 y.o.  female  1940  [id number]            Requesting physician: Dr Robin    Reason for Consultation: ICU admission    CC: Sepsis, hypotension, acute pyelonephritis, right ureteral calculi s/p cystoscopy with stent    History of Present Illness:  Kristyn Lugo is an 84 year old female with past medical history chronic constipation versus diarrhea, kidney nodule for which she follows with Dr. Montoya once yearly with no prior history of CKD or kidney stones, chronic diastolic heart failure who presented to the ED yesterday after patient complained of diarrhea for the last 24 hours and stated was not feeling well in general.  Patient reportedly a poor historian but denied any dysuria or hematuria.  She did report her urine output seemed to be decreased when compared to usual.  She denied any fever, did complain of some nausea and 1 episode of vomiting.  Upon arrival to ED she was noted to be hypotensive with SBP 80s, she received 3.2 L IVF.  CT abdomen and pelvis revealed dilated right kidney with hydronephrosis and a stone in the right ureter.  UA was not able to be obtained related to dehydration and hypotension.  Urology was consulted and took patient to the OR, she is now status post cystoscopy with stent insertion with Dr Ledesma, per his note after stent placement he did receive pyuria in return.  She has been febrile up to 102.9, tachycardic.  Reportedly prior to OR patient was initially on room air however was found to be hypoxic and placed on nasal cannula and uptitrated to 4L NC, chest x-ray was obtained which revealed no focal pulmonary consolidation with cardiomegaly.  She was noted to have pulmonary vascular congestion and dependent atelectatic changes bilaterally at the lung bases.    Received patient to CCU from PACU, she was brought up intubated, unclear as to why patient  was not extubated in OR/PACU.  She is requiring max Neoand still with low BPs and need for second pressor.  Chest x-ray with some pulmonary vascular congestion.  She had Booth catheter placed with minimal urine output.  She is receiving continuous IVF and has been started on Ceftriaxone.  She was reportedly not receiving any sedation upon arrival to CCU and was somewhat restless but did follow commands.  She was started on propofol.  She has no family at bedside.    Appears follows with Dr. Walsh [facility] cardiology.    Review of Systems  Unable related to intubated/ventilated    Past Medical History:  Past Medical History:   Diagnosis Date    Aortic valve regurgitation     Chronic kidney disease     Low back pain        Past Surgical History:  History reviewed. No pertinent surgical history.     Home Meds:  Medications Prior to Admission   Medication Sig Dispense Refill Last Dose/Taking    acetaminophen (TYLENOL) 500 MG tablet Take 1 tablet by mouth.   11/2/2024    aspirin 81 MG EC tablet Take 1 tablet by mouth Daily.   11/2/2024 Morning    cholecalciferol (Vitamin D, Cholecalciferol,) 25 MCG (1000 UT) tablet Take 1 tablet by mouth Daily.   11/2/2024    Docusate Calcium (STOOL SOFTENER PO) Take 1 tablet by mouth Daily.   11/2/2024    folic acid (FOLVITE) 400 MCG tablet Take 1 tablet by mouth Daily.   11/2/2024 Morning    metoprolol-hydrochlorothiazide (LOPRESSOR HCT) 100-25 MG per tablet Take 1 tablet by mouth Daily.   11/2/2024 Morning    multivitamin with minerals (CENTRUM SILVER PO) Take 1 tablet by mouth Daily.   11/2/2024    naproxen sodium (ALEVE) 220 MG tablet Take 1 tablet by mouth Daily As Needed.   11/3/2024    Omega-3 Fatty Acids (fish oil) 1200 MG capsule capsule Take 1 capsule by mouth Daily With Breakfast.   11/2/2024    simvastatin (ZOCOR) 40 MG tablet Take 1 tablet by mouth Daily.   11/2/2024 Evening    escitalopram (LEXAPRO) 10 MG tablet Take 1 tablet by mouth Daily. (Patient not taking:  "Reported on 11/3/2024)   Not Taking       Allergies:  Allergies   Allergen Reactions    Fenofibrate Unknown - High Severity     Pt is unaware    Penicillins Unknown - High Severity       Social History:   Social History     Socioeconomic History    Marital status:    Tobacco Use    Smoking status: Former     Types: Cigarettes    Smokeless tobacco: Never   Substance and Sexual Activity    Drug use: Never    Sexual activity: Defer       Family History:  History reviewed. No pertinent family history.    Physical Exam:  BP (!) 75/42   Pulse 104   Temp (!) 102 °F (38.9 °C) (Oral)   Resp (!) 35   Ht 154.9 cm (61\")   Wt 77 kg (169 lb 12.1 oz)   SpO2 97%   BMI 32.07 kg/m²  Body mass index is 32.07 kg/m². 97% 77 kg (169 lb 12.1 oz)    Physical Exam  Constitutional: Elderly female lying in bed, sedated on propofol, synchronous with ventilator  Head: NCAT  Eyes: No pallor, anicteric conjunctiva, EOMI. PERRLA.  ENMT: ETT to ventilator, no oral thrush moist MM  NECK: Trachea midline, no thyromegaly, no JVD  Heart: Tachycardic rate, regular rhythm, no pedal edema  Lungs: LORENA +, chemical breath sounds bilaterally otherwise diminished no wheezing  Abdomen: Soft, nondistended.  Obese.  No tenderness, guarding or rigidity. No palpable masses.  Indwelling nj catheter with minimal urine output, urine appears dark/somewhat bloody.  Extremities: Extremities warm and well perfused. No cyanosis/ clubbing.  All pulses palpable.  Right radial arterial line with good waveform.  Neuro: Sedated however prior to this was following commands, moves all remedies spontaneously  Psych: Sedated on ventilator unable    PPE recommended per [facility] infectious disease Isolation protocol for the current clinical scenario(as mentioned below) was followed.    LABS:  No results found for: \"COVID19\"    Lab Results   Component Value Date    CALCIUM 8.4 (L) 11/03/2024     Results from last 7 days   Lab Units 11/03/24  1546   MAGNESIUM " "mg/dL 1.5*   SODIUM mmol/L 140   POTASSIUM mmol/L 3.6   CHLORIDE mmol/L 107   CO2 mmol/L 19.9*   BUN mg/dL 30*   CREATININE mg/dL 2.17*   GLUCOSE mg/dL 97   CALCIUM mg/dL 8.4*   WBC 10*3/mm3 17.23*   HEMOGLOBIN g/dL 10.9*   PLATELETS 10*3/mm3 158   ALT (SGPT) U/L 18   AST (SGOT) U/L 33*   PROBNP pg/mL 5,785.0*   PROCALCITONIN ng/mL 93.60*     Lab Results   Component Value Date    TROPONINT 230 (C) 11/03/2024     Results from last 7 days   Lab Units 11/03/24  1800 11/03/24  1546   HSTROP T ng/L 230* 274*         Results from last 7 days   Lab Units 11/03/24  1800 11/03/24  1546   PROCALCITONIN ng/mL  --  93.60*   LACTATE mmol/L 4.3* 4.9*     Results from last 7 days   Lab Units 11/03/24  2248 11/03/24  1558   PH, ARTERIAL pH units 7.290* 7.396   PCO2, ARTERIAL mm Hg 34.7* 29.6*   PO2 ART mm Hg 298.5* 88.1   O2 SATURATION ART % 99.9* 96.9   FLOW RATE lpm  --  3.0000   MODALITY  Adult Vent Cannula         Results from last 7 days   Lab Units 11/03/24  1546   INR  1.24*         No results found for: \"TSH\"  Estimated Creatinine Clearance: 17.8 mL/min (A) (by C-G formula based on SCr of 2.17 mg/dL (H)).  Results from last 7 days   Lab Units 11/03/24  1745   NITRITE UA  Negative   WBC UA /HPF 3-5*   BACTERIA UA /HPF None Seen   SQUAM EPITHEL UA /HPF 3-6*        Imaging: I personally visualized the images of scans/x-rays performed within last 3 days.      Assessment:  Right hydronephrosis with nephrolithiasis  Right distal ureteral calculi s/p cysto with stent insertion 11/3/24  EMMA  Sepsis vs hypovolemic shock  Hypotension  Elevated troponin  Elevated proBNP  Acute hypoxemic respiratory failure  Valvular heart disease  Chronic diastolic heart failure  Anemia  Chronic constipation vs diarrhea  Hx of HTN  Aneurysm of ascending aorta  HLD    Recommendations:  Sepsis with right distal ureteral calculi, pyuria, status post cystoscopy with stent insertion, EMMA  -Will continue to trend lactate, is downtrending and now 4.3, she is " receiving gentle IVF and will continue these  -Procalcitonin was significantly elevated with leukocytosis, she is receiving IV ceftriaxone  -Blood cultures pending, urine actually with 3-5 WBCs but no bacteria seen, trace leukocytes so no culture was required  -As needed acetaminophen for fever, Tmax 102.9 at this point but improving  -Patient with EMMA with initial creatinine 2.17, nephrology has been consulted.  Recheck AM BMP  -Strict I/O  -Patient requiring pressor support with max José Miguel, now on Levo at 0.1 she has bilateral 20-gauge peripheral IVs, if continue to uptrend on pressor support will consider central line but for now we will hold    2.  Acute hypoxemic respiratory failure  -ABG performed in PACU with metabolic acidosis with worsening bicarb compared to prior but this was at 10:45 PM last night, will repeat now  -Chest x-ray reviewed, ETT in good position, does appear to have vascular congestion bilaterally but no acute infiltrate  -Ventilator bundle, spontaneous breathing trial in a.m.  -Sedation with propofol, as needed fentanyl     3.  Chronic diastolic heart failure, elevated troponin and proBNP  -Prior echo unable to be viewed as they were performed at   -She had elevated troponin but downtrending, EKG with no acute ST changes, will continue to trend  -Does not appear volume overloaded on exam although does have some vascular congestion on chest x-ray, will need to monitor fluid status closely  -Continue ASA 81 mg    4.  History of HTN  -Metoprolol and HCTZ held as patient is hypotensive    SCDs  AM labs  Protonix IVP  Arterial line  Indwelling nj catheter    Joyce Jauregui, JENNIFER  11/4/2024  00:24 EST      Much of this encounter note is an electronic transcription/translation of spoken language to printed text using Dragon Software.

## 2024-11-04 NOTE — CONSULTS
INITIAL CONSULT NOTE      Patient Name: Kristyn Lugo  : 1940  MRN: [id number]  Primary Care Physician: Sean Fisher MD  Date of admission: 11/3/2024    Patient Care Team:  Sean Fisher MD as PCP - General (Internal Medicine)        Reason for Consult:       Acute kidney injury, metabolic acidosis  Subjective   History of Present Illness:   Chief Complaint:   Chief Complaint   Patient presents with    Dizziness    Nausea    Vomiting    Diarrhea     HISTORY:  Kristyn Lugo is a 84 y.o. female with past medical history of chronic diastolic heart failure, chronic constipation and kidney nodule. She presented to the ED with complaints of diarrhea and generalized malaise. Upon arrival, she was hypotensive with SBP in the 80s. CT showed dilated right kidney with hydronephrosis and a stone in the right ureter. Urology was consulted, and she underwent cystoscopy with stent placement. Additionally, she was febrile, tachycardic and hypoxic. The patient continues to require mechanical ventilation.     The patient is also noted to have acute kidney injury. She has a history of chronic kidney disease, most likely due to hypertension and advanced age. Baseline creatinine is around 1.0mg/dL. Initial creatinine yesterday 2.17. She received a total of 3.2L NS bolus yesterday and was then placed on NS at 75cc/hr. Today's creatinine 2.41. Documented urine output yesterday 140cc. UA on arrival with protein, WBC, leukocytes and trace ketones. The patient is also noted to have worsening metabolic acidosis, with initial CO2 19.9 and today's at 15.0 with anion gap of 17.0. Lactic as high as 4.9, now 2.4. She is currently on Rocephin. Potassium also low this am at 2.9. Renal services are requested for acute kidney injury.       Review of systems:   Unable to obtain    ROS was otherwise negative except as mentioned in the [facility].       Personal History:     Past Medical History:   Past Medical History:    Diagnosis Date    Aortic valve regurgitation     Chronic kidney disease     Low back pain        Surgical History:    History reviewed. No pertinent surgical history.    Family History: family history is not on file. Otherwise pertinent FHx was reviewed and unremarkable.     Social History:  reports that she has quit smoking. Her smoking use included cigarettes. She has never used smokeless tobacco. She reports that she does not use drugs.    Medications:  Prior to Admission medications    Medication Sig Start Date End Date Taking? Authorizing Provider   acetaminophen (TYLENOL) 500 MG tablet Take 1 tablet by mouth.   Yes Bruna Braun MD   aspirin 81 MG EC tablet Take 1 tablet by mouth Daily.   Yes Bruna Braun MD   cholecalciferol (Vitamin D, Cholecalciferol,) 25 MCG (1000 UT) tablet Take 1 tablet by mouth Daily.   Yes Bruna Braun MD   Docusate Calcium (STOOL SOFTENER PO) Take 1 tablet by mouth Daily.   Yes Bruna Braun MD   folic acid (FOLVITE) 400 MCG tablet Take 1 tablet by mouth Daily.   Yes Bruna Braun MD   metoprolol-hydrochlorothiazide (LOPRESSOR HCT) 100-25 MG per tablet Take 1 tablet by mouth Daily.   Yes Bruna Braun MD   multivitamin with minerals (CENTRUM SILVER PO) Take 1 tablet by mouth Daily.   Yes Bruna Braun MD   naproxen sodium (ALEVE) 220 MG tablet Take 1 tablet by mouth Daily As Needed.   Yes Bruna Braun MD   Omega-3 Fatty Acids (fish oil) 1200 MG capsule capsule Take 1 capsule by mouth Daily With Breakfast.   Yes Bruna Braun MD   simvastatin (ZOCOR) 40 MG tablet Take 1 tablet by mouth Daily.   Yes Bruna Braun MD   escitalopram (LEXAPRO) 10 MG tablet Take 1 tablet by mouth Daily.  Patient not taking: Reported on 11/3/2024    Bruna Braun MD     Scheduled Meds:aspirin, 81 mg, Oral, Daily  cefTRIAXone, 2,000 mg, Intravenous, Q24H  chlorhexidine, 15 mL, Mouth/Throat, Q12H  escitalopram, 10 mg,  Oral, Daily  folic acid, 400 mcg, Oral, Daily  mupirocin, 1 Application, Each Nare, BID  pantoprazole, 40 mg, Intravenous, Q AM  potassium chloride, 10 mEq, Intravenous, Q1H  sodium chloride, 10 mL, Intravenous, Q12H      Continuous Infusions:norepinephrine, 0.02-0.3 mcg/kg/min, Last Rate: 0.24 mcg/kg/min (11/04/24 0730)  phenylephrine, 0.5-3 mcg/kg/min, Last Rate: 2.5 mcg/kg/min (11/04/24 0800)  propofol, 5-50 mcg/kg/min, Last Rate: 40 mcg/kg/min (11/04/24 0358)      PRN Meds:  acetaminophen **OR** acetaminophen    fentaNYL citrate (PF)    nitroglycerin    sodium chloride    sodium chloride  Allergies:    Allergies   Allergen Reactions    Fenofibrate Unknown - High Severity     Pt is unaware    Penicillins Unknown - High Severity       Objective   Exam:     Vital Signs  Temp:  [97.7 °F (36.5 °C)-102.9 °F (39.4 °C)] 99.1 °F (37.3 °C)  Heart Rate:  [] 88  Resp:  [16-36] 23  BP: ()/() 75/42  Arterial Line BP: ()/(43-85) 89/52  FiO2 (%):  [40 %-100 %] 40 %  SpO2:  [88 %-100 %] 98 %  on  Flow (L/min) (Oxygen Therapy):  [2-4] 4;   Device (Oxygen Therapy): ventilator  Body mass index is 32.66 kg/m².  EXAM  General:  84 yr old  female in no acute distress, acutely ill-appearing    Head:      Normocephalic and atraumatic.    Neck:      No masses, thyromegaly,  trachea central   Lungs:    Clear bilaterally to auscultation.    Heart:      Regular rate and rhythm, no murmur no gallop  Abd:        Soft, nontender, not distended, bowel sounds positive, no shifting dullness.  Msk:        No deformity or scoliosis noted of thoracic or lumbar spine.    Pulses:   Pulses normal in all 4 extremities.    Extremities:        No cyanosis or clubbing--no edema.    Neuro:    sedated, on vent  Skin:       Intact without lesions or rashes.    Psych:    Unable to obtain      Results Review:  I have personally reviewed most recent Data :  MARY ANN @LAB[facility](creatinine:10)  CBC    Results from last 7 days   Lab Units  11/04/24  0526 11/03/24  1546   WBC 10*3/mm3 32.40* 17.23*   HEMOGLOBIN g/dL 11.6* 10.9*   PLATELETS 10*3/mm3 182 158     CMP   Results from last 7 days   Lab Units 11/04/24  0526 11/03/24  1546   SODIUM mmol/L 141 140   POTASSIUM mmol/L 2.9* 3.6   CHLORIDE mmol/L 109* 107   CO2 mmol/L 15.0* 19.9*   BUN mg/dL 36* 30*   CREATININE mg/dL 2.41* 2.17*   GLUCOSE mg/dL 126* 97   ALBUMIN g/dL  --  3.1*   BILIRUBIN mg/dL  --  0.4   ALK PHOS U/L  --  53   AST (SGOT) U/L  --  33*   ALT (SGPT) U/L  --  18     ABG    Results from last 7 days   Lab Units 11/04/24  0248 11/03/24  2248 11/03/24  1558   PH, ARTERIAL pH units 7.251* 7.290* 7.396   PCO2, ARTERIAL mm Hg 40.3 34.7* 29.6*   PO2 ART mm Hg 91.5 298.5* 88.1   O2 SATURATION ART % 95.6 99.9* 96.9   BASE EXCESS ART mmol/L -9.0* -9.1* -5.9*     XR Chest 1 View    Result Date: 11/4/2024   1. Interval removal of NG tube and left IJ catheter. 2. Subtle lucency along the right hemidiaphragm, indeterminate. Questionable for free intraperitoneal air. Clinical correlation with left side down decubitus abdominal radiograph as clinically indicated 3. Cardiomegaly with vascular congestion. 4. Possible airways disease. 5. Heterogeneous basilar opacities  This report was finalized on 11/4/2024 8:37 AM by Dr. Emmanuel Miranda M.D on Workstation: [id number]      XR Chest 1 View    Result Date: 11/4/2024   1. Left IJ catheter tip appears sidewalled against the SVC. No pneumothorax. 2. Interval placement of NG tube. 3. Ill-defined left lung base opacity, stable  This report was finalized on 11/4/2024 8:03 AM by Dr. Emmanuel Miranda M.D on Workstation: [id number]      XR Chest 1 View    Result Date: 11/3/2024  Electronically signed by Bulmaro Abraham MD on 11-03-24 at 2259    XR Chest 1 View    Result Date: 11/3/2024  No focal pulmonary consolidation. Cardiomegaly. Follow-up as clinical indications persist.  This report was finalized on 11/3/2024 3:33 PM by Dr. Jose Hardy M.D on  Workstation: [email or web address]             Assessment & Plan   Assessment and Plan:         Acute pyelonephritis    ASSESSMENT:  Acute kidney injury  Chronic kidney disease, stage 3, with baseline creatinine roughly 1.0mg/dL; felt to be secondary to hypertension and advanced age  Metabolic acidosis  Hypokalemia  Right hydronephrosis with nephrolithiasis  Right distal ureteral calculi, s/p cysto with stent insertion 11/3/24  Sepsis  Acute hypoxemic respiratory failure  Chronic diastolic heart failure      PLAN :     Renal function mildly worsened today but hopefully may be leveling off  EMMA most likely multifactorial and secondary to hemodynamic insults, sepsis and right hydronephrosis, most likely has ATN  Making urine. Will check comprehensive urine studies  Supplement potassium. Repeat labs at noon. Nursing instructed to call with results  Metabolic acidosis, with lactic acidosis, some worsening may be due to NS administration  Adjust to 1/2 NS with 75mEq bicarbonate at 75cc/hr  Follow up with repeat labs at noon  Monitor volume closely given diastolic heart failure. Clinically no evidence of increased volume  Keep MAP>65, currently on Levophed and Neosynephrine  Antibiotics as per primary team. Currently on Rocephin  Will request strict I/Os, daily labs  Discussed in detail with RN  No urgent need for RRT but will follow closely, at high risk for requiring CRRT    Thank you for this consultation.       Carole Montoya MD  [facility]  11/4/2024  08:58 EST

## 2024-11-04 NOTE — PROGRESS NOTES
Kristyn Anitha Lugo   [id number]    Urology progress note      Right ureter calculi   Sepsis  S/p stent placement.     PLAN:   IV abx per primary/icu.   Will need definitive ureteroscopy at later date when acute issues resolve.     Tony Ledesma MD   [facility]  [phone]

## 2024-11-05 ENCOUNTER — APPOINTMENT (OUTPATIENT)
Dept: GENERAL RADIOLOGY | Facility: HOSPITAL | Age: 84
DRG: 853 | End: 2024-11-05
Payer: MEDICARE

## 2024-11-05 LAB
ANION GAP SERPL CALCULATED.3IONS-SCNC: 14 MMOL/L (ref 5–15)
ARTERIAL PATENCY WRIST A: ABNORMAL
ARTERIAL PATENCY WRIST A: ABNORMAL
ATMOSPHERIC PRESS: 748.8 MMHG
ATMOSPHERIC PRESS: 750.1 MMHG
BASE EXCESS BLDA CALC-SCNC: -4.7 MMOL/L (ref 0–2)
BASE EXCESS BLDA CALC-SCNC: -5.8 MMOL/L (ref 0–2)
BASOPHILS # BLD MANUAL: 0 10*3/MM3 (ref 0–0.2)
BASOPHILS NFR BLD MANUAL: 0 % (ref 0–1.5)
BDY SITE: ABNORMAL
BDY SITE: ABNORMAL
BUN SERPL-MCNC: 39 MG/DL (ref 8–23)
BUN/CREAT SERPL: 19.6 (ref 7–25)
CALCIUM SPEC-SCNC: 7.1 MG/DL (ref 8.6–10.5)
CHLORIDE SERPL-SCNC: 108 MMOL/L (ref 98–107)
CO2 BLDA-SCNC: 21 MMOL/L (ref 23–27)
CO2 BLDA-SCNC: 21.2 MMOL/L (ref 23–27)
CO2 SERPL-SCNC: 18 MMOL/L (ref 22–29)
CREAT SERPL-MCNC: 1.99 MG/DL (ref 0.57–1)
D-LACTATE SERPL-SCNC: 1.6 MMOL/L (ref 0.5–2)
DACRYOCYTES BLD QL SMEAR: ABNORMAL
DEPRECATED RDW RBC AUTO: 43.7 FL (ref 37–54)
DEVICE COMMENT: ABNORMAL
DEVICE COMMENT: ABNORMAL
EGFRCR SERPLBLD CKD-EPI 2021: 24.2 ML/MIN/1.73
EOSINOPHIL # BLD MANUAL: 0 10*3/MM3 (ref 0–0.4)
EOSINOPHIL NFR BLD MANUAL: 0 % (ref 0.3–6.2)
ERYTHROCYTE [DISTWIDTH] IN BLOOD BY AUTOMATED COUNT: 13.5 % (ref 12.3–15.4)
GLUCOSE BLDC GLUCOMTR-MCNC: 112 MG/DL (ref 70–130)
GLUCOSE BLDC GLUCOMTR-MCNC: 116 MG/DL (ref 70–130)
GLUCOSE BLDC GLUCOMTR-MCNC: 120 MG/DL (ref 70–130)
GLUCOSE BLDC GLUCOMTR-MCNC: 124 MG/DL (ref 70–130)
GLUCOSE BLDC GLUCOMTR-MCNC: 133 MG/DL (ref 70–130)
GLUCOSE BLDC GLUCOMTR-MCNC: 134 MG/DL (ref 70–130)
GLUCOSE SERPL-MCNC: 118 MG/DL (ref 65–99)
HCO3 BLDA-SCNC: 19.8 MMOL/L (ref 22–28)
HCO3 BLDA-SCNC: 20.2 MMOL/L (ref 22–28)
HCT VFR BLD AUTO: 31.5 % (ref 34–46.6)
HEMODILUTION: NO
HEMODILUTION: NO
HGB BLD-MCNC: 10.7 G/DL (ref 12–15.9)
INHALED O2 CONCENTRATION: 30 %
INHALED O2 CONCENTRATION: 30 %
LYMPHOCYTES # BLD MANUAL: 0.31 10*3/MM3 (ref 0.7–3.1)
LYMPHOCYTES NFR BLD MANUAL: 0 % (ref 5–12)
MCH RBC QN AUTO: 29.6 PG (ref 26.6–33)
MCHC RBC AUTO-ENTMCNC: 34 G/DL (ref 31.5–35.7)
MCV RBC AUTO: 87 FL (ref 79–97)
METAMYELOCYTES NFR BLD MANUAL: 1.1 % (ref 0–0)
MICROCYTES BLD QL: ABNORMAL
MODALITY: ABNORMAL
MODALITY: ABNORMAL
MONOCYTES # BLD: 0 10*3/MM3 (ref 0.1–0.9)
NEUTROPHILS # BLD AUTO: 27.18 10*3/MM3 (ref 1.7–7)
NEUTROPHILS NFR BLD MANUAL: 97.8 % (ref 42.7–76)
NRBC BLD AUTO-RTO: 0 /100 WBC (ref 0–0.2)
O2 A-A PPRESDIFF RESPIRATORY: 0.5 MMHG
O2 A-A PPRESDIFF RESPIRATORY: 0.5 MMHG
PCO2 BLDA: 35.3 MM HG (ref 35–45)
PCO2 BLDA: 38.2 MM HG (ref 35–45)
PEEP RESPIRATORY: 5 CM[H2O]
PEEP RESPIRATORY: 5 CM[H2O]
PH BLDA: 7.32 PH UNITS (ref 7.35–7.45)
PH BLDA: 7.36 PH UNITS (ref 7.35–7.45)
PLAT MORPH BLD: NORMAL
PLATELET # BLD AUTO: 103 10*3/MM3 (ref 140–450)
PMV BLD AUTO: 12.7 FL (ref 6–12)
PO2 BLD: 267 MM[HG] (ref 0–500)
PO2 BLD: 280 MM[HG] (ref 0–500)
PO2 BLDA: 80.1 MM HG (ref 80–100)
PO2 BLDA: 83.9 MM HG (ref 80–100)
POLYCHROMASIA BLD QL SMEAR: ABNORMAL
POTASSIUM SERPL-SCNC: 3.4 MMOL/L (ref 3.5–5.2)
QT INTERVAL: 392 MS
QTC INTERVAL: 485 MS
RBC # BLD AUTO: 3.62 10*6/MM3 (ref 3.77–5.28)
SAO2 % BLDCOA: 94.8 % (ref 92–98.5)
SAO2 % BLDCOA: 96 % (ref 92–98.5)
SET MECH RESP RATE: 20
SET MECH RESP RATE: 20
SMUDGE CELLS BLD QL SMEAR: ABNORMAL
SODIUM SERPL-SCNC: 140 MMOL/L (ref 136–145)
TOTAL RATE: 26 BREATHS/MINUTE
TOTAL RATE: 29 BREATHS/MINUTE
VARIANT LYMPHS NFR BLD MANUAL: 1.1 % (ref 19.6–45.3)
VENTILATOR MODE: ABNORMAL
VENTILATOR MODE: AC
VT ON VENT VENT: 400 ML
VT ON VENT VENT: 400 ML
WBC NRBC COR # BLD AUTO: 27.79 10*3/MM3 (ref 3.4–10.8)

## 2024-11-05 PROCEDURE — 25010000002 FENTANYL CITRATE (PF) 50 MCG/ML SOLUTION: Performed by: HOSPITALIST

## 2024-11-05 PROCEDURE — 83605 ASSAY OF LACTIC ACID: CPT | Performed by: INTERNAL MEDICINE

## 2024-11-05 PROCEDURE — 94799 UNLISTED PULMONARY SVC/PX: CPT

## 2024-11-05 PROCEDURE — 25010000002 FENTANYL CITRATE (PF) 50 MCG/ML SOLUTION

## 2024-11-05 PROCEDURE — 80048 BASIC METABOLIC PNL TOTAL CA: CPT

## 2024-11-05 PROCEDURE — 82803 BLOOD GASES ANY COMBINATION: CPT

## 2024-11-05 PROCEDURE — 71045 X-RAY EXAM CHEST 1 VIEW: CPT

## 2024-11-05 PROCEDURE — 25010000002 PROPOFOL 10 MG/ML EMULSION: Performed by: ANESTHESIOLOGY

## 2024-11-05 PROCEDURE — 85007 BL SMEAR W/DIFF WBC COUNT: CPT | Performed by: INTERNAL MEDICINE

## 2024-11-05 PROCEDURE — 94003 VENT MGMT INPAT SUBQ DAY: CPT

## 2024-11-05 PROCEDURE — 93005 ELECTROCARDIOGRAM TRACING: CPT | Performed by: INTERNAL MEDICINE

## 2024-11-05 PROCEDURE — 94761 N-INVAS EAR/PLS OXIMETRY MLT: CPT

## 2024-11-05 PROCEDURE — 93010 ELECTROCARDIOGRAM REPORT: CPT | Performed by: INTERNAL MEDICINE

## 2024-11-05 PROCEDURE — 82948 REAGENT STRIP/BLOOD GLUCOSE: CPT

## 2024-11-05 PROCEDURE — 99232 SBSQ HOSP IP/OBS MODERATE 35: CPT | Performed by: INTERNAL MEDICINE

## 2024-11-05 PROCEDURE — 25010000002 CEFTRIAXONE PER 250 MG: Performed by: INTERNAL MEDICINE

## 2024-11-05 PROCEDURE — 85025 COMPLETE CBC W/AUTO DIFF WBC: CPT | Performed by: INTERNAL MEDICINE

## 2024-11-05 RX ORDER — DEXMEDETOMIDINE HYDROCHLORIDE 4 UG/ML
.2-1.5 INJECTION, SOLUTION INTRAVENOUS
Status: DISCONTINUED | OUTPATIENT
Start: 2024-11-05 | End: 2024-11-08

## 2024-11-05 RX ORDER — FENTANYL CITRATE 50 UG/ML
50 INJECTION, SOLUTION INTRAMUSCULAR; INTRAVENOUS ONCE
Status: COMPLETED | OUTPATIENT
Start: 2024-11-05 | End: 2024-11-05

## 2024-11-05 RX ADMIN — CHLORHEXIDINE GLUCONATE 15 ML: 1.2 RINSE ORAL at 20:09

## 2024-11-05 RX ADMIN — ASPIRIN 81 MG: 81 TABLET, CHEWABLE ORAL at 09:55

## 2024-11-05 RX ADMIN — PROPOFOL INJECTABLE EMULSION 50 MCG/KG/MIN: 10 INJECTION, EMULSION INTRAVENOUS at 03:52

## 2024-11-05 RX ADMIN — CEFTRIAXONE 2000 MG: 2 INJECTION, POWDER, FOR SOLUTION INTRAMUSCULAR; INTRAVENOUS at 17:11

## 2024-11-05 RX ADMIN — Medication 0.15 MCG/KG/MIN: at 20:11

## 2024-11-05 RX ADMIN — Medication 10 ML: at 09:55

## 2024-11-05 RX ADMIN — DEXMEDETOMIDINE HYDROCHLORIDE 1 MCG/KG/HR: 4 INJECTION, SOLUTION INTRAVENOUS at 20:10

## 2024-11-05 RX ADMIN — PROPOFOL INJECTABLE EMULSION 40 MCG/KG/MIN: 10 INJECTION, EMULSION INTRAVENOUS at 08:40

## 2024-11-05 RX ADMIN — Medication 400 MCG: at 09:55

## 2024-11-05 RX ADMIN — FENTANYL CITRATE 50 MCG: 50 INJECTION, SOLUTION INTRAMUSCULAR; INTRAVENOUS at 02:03

## 2024-11-05 RX ADMIN — DEXMEDETOMIDINE HYDROCHLORIDE 0.2 MCG/KG/HR: 4 INJECTION, SOLUTION INTRAVENOUS at 17:10

## 2024-11-05 RX ADMIN — Medication 0.24 MCG/KG/MIN: at 04:56

## 2024-11-05 RX ADMIN — MUPIROCIN 1 APPLICATION: 20 OINTMENT TOPICAL at 09:55

## 2024-11-05 RX ADMIN — FENTANYL CITRATE 50 MCG: 50 INJECTION, SOLUTION INTRAMUSCULAR; INTRAVENOUS at 01:02

## 2024-11-05 RX ADMIN — FENTANYL CITRATE 50 MCG: 50 INJECTION, SOLUTION INTRAMUSCULAR; INTRAVENOUS at 03:52

## 2024-11-05 RX ADMIN — FENTANYL CITRATE 50 MCG: 50 INJECTION, SOLUTION INTRAMUSCULAR; INTRAVENOUS at 22:13

## 2024-11-05 RX ADMIN — MUPIROCIN 1 APPLICATION: 20 OINTMENT TOPICAL at 20:09

## 2024-11-05 RX ADMIN — FENTANYL CITRATE 50 MCG: 50 INJECTION, SOLUTION INTRAMUSCULAR; INTRAVENOUS at 17:13

## 2024-11-05 RX ADMIN — FENTANYL CITRATE 50 MCG: 50 INJECTION, SOLUTION INTRAMUSCULAR; INTRAVENOUS at 10:19

## 2024-11-05 RX ADMIN — FENTANYL CITRATE 50 MCG: 50 INJECTION, SOLUTION INTRAMUSCULAR; INTRAVENOUS at 13:37

## 2024-11-05 RX ADMIN — PANTOPRAZOLE SODIUM 40 MG: 40 INJECTION, POWDER, FOR SOLUTION INTRAVENOUS at 05:29

## 2024-11-05 RX ADMIN — Medication 0.24 MCG/KG/MIN: at 12:23

## 2024-11-05 RX ADMIN — ESCITALOPRAM OXALATE 10 MG: 10 TABLET, FILM COATED ORAL at 09:55

## 2024-11-05 RX ADMIN — FENTANYL CITRATE 50 MCG: 50 INJECTION, SOLUTION INTRAMUSCULAR; INTRAVENOUS at 07:17

## 2024-11-05 RX ADMIN — CHLORHEXIDINE GLUCONATE 15 ML: 1.2 RINSE ORAL at 09:55

## 2024-11-05 RX ADMIN — FENTANYL CITRATE 50 MCG: 50 INJECTION, SOLUTION INTRAMUSCULAR; INTRAVENOUS at 08:46

## 2024-11-05 RX ADMIN — PROPOFOL INJECTABLE EMULSION 40 MCG/KG/MIN: 10 INJECTION, EMULSION INTRAVENOUS at 13:34

## 2024-11-05 RX ADMIN — FENTANYL CITRATE 50 MCG: 50 INJECTION, SOLUTION INTRAMUSCULAR; INTRAVENOUS at 19:43

## 2024-11-05 RX ADMIN — FENTANYL CITRATE 50 MCG: 50 INJECTION, SOLUTION INTRAMUSCULAR; INTRAVENOUS at 04:43

## 2024-11-05 RX ADMIN — Medication 10 ML: at 20:10

## 2024-11-05 NOTE — PROGRESS NOTES
PROGRESS NOTE      Patient Name: Kristyn Lugo  : 1940  MRN: [id number]  Primary Care Physician: Sean Fisher MD  Date of admission: 11/3/2024    Patient Care Team:  Sean Fisher MD as PCP - General (Internal Medicine)        Subjective   Subjective:     Patient still intubated  Review of systems:  All ROS negative      Allergies:    Allergies   Allergen Reactions    Fenofibrate Unknown - High Severity     Pt is unaware    Penicillins Unknown - High Severity       Objective   Exam:     Vital Signs  Temp:  [96.4 °F (35.8 °C)-99.7 °F (37.6 °C)] 98.6 °F (37 °C)  Heart Rate:  [] 85  Resp:  [22-28] 23  BP: (104-119)/(52-60) 104/60  FiO2 (%):  [30 %] 30 %  SpO2:  [94 %-100 %] 95 %  on   ;   Device (Oxygen Therapy): ventilator  Body mass index is 32.89 kg/m².    General:  elderly  female intubated sedated.    Head:      Normocephalic and atraumatic.    Eyes:      PERRL/EOM intact, conjunctivae and sclerae clear without nystagmus.    Neck:      No masses, thyromegaly,  trachea central with normal respiratory effort   Lungs:    Clear bilaterally to auscultation.    Heart:      Regular rate and rhythm, no murmur no gallop  Abd:        Soft, nontender, not distended, bowel sounds positive, no shifting dullness   Pulses:   Pulses palpable  Extr:        No cyanosis or clubbing--mild edema.    Neuro:    sedated  Skin:       Intact without lesions or rashes.    Psych:    Alert and cooperative; normal mood and affect; .      Results Review:  I have personally reviewed most recent Data :  CBC    Results from last 7 days   Lab Units 24  0322 24  1203 24  1546   WBC 10*3/mm3 27.79* 35.25* 32.40* 17.23*   HEMOGLOBIN g/dL 10.7* 11.7* 11.6* 10.9*   PLATELETS 10*3/mm3 103* 156 182 158     CMP   Results from last 7 days   Lab Units 24  0322 24  1823 24  1203 24  0526 24  1546   SODIUM mmol/L 140 144 139 141 140   POTASSIUM mmol/L 3.4* 3.7  3.8 2.9* 3.6   CHLORIDE mmol/L 108* 109* 109* 109* 107   CO2 mmol/L 18.0* 17.5* 15.5* 15.0* 19.9*   BUN mg/dL 39* 39* 37* 36* 30*   CREATININE mg/dL 1.99* 2.15* 2.54* 2.41* 2.17*   GLUCOSE mg/dL 118* 128* 136* 126* 97   ALBUMIN g/dL  --   --  2.9*  --  3.1*   BILIRUBIN mg/dL  --   --  0.6  --  0.4   ALK PHOS U/L  --   --  102  --  53   AST (SGOT) U/L  --   --  42*  --  33*   ALT (SGPT) U/L  --   --  22  --  18     ABG    Results from last 7 days   Lab Units 11/05/24  0703 11/05/24  0334 11/04/24  1207 11/04/24  0248 11/03/24  2248 11/03/24  1558   PH, ARTERIAL pH units 7.364 7.322* 7.285* 7.251* 7.290* 7.396   PCO2, ARTERIAL mm Hg 35.3 38.2 36.8 40.3 34.7* 29.6*   PO2 ART mm Hg 83.9 80.1 107.3* 91.5 298.5* 88.1   O2 SATURATION ART % 96.0 94.8 97.5 95.6 99.9* 96.9   BASE EXCESS ART mmol/L -4.7* -5.8* -8.5* -9.0* -9.1* -5.9*     XR Chest 1 View    Result Date: 11/4/2024   1. Interval removal of NG tube and left IJ catheter. 2. Subtle lucency along the right hemidiaphragm, indeterminate. Questionable for free intraperitoneal air. Clinical correlation with left side down decubitus abdominal radiograph as clinically indicated 3. Cardiomegaly with vascular congestion. 4. Possible airways disease. 5. Heterogeneous basilar opacities  This report was finalized on 11/4/2024 8:37 AM by Dr. Emmanuel Miranda M.D on Workstation: [id number]      XR Chest 1 View    Result Date: 11/4/2024   1. Left IJ catheter tip appears sidewalled against the SVC. No pneumothorax. 2. Interval placement of NG tube. 3. Ill-defined left lung base opacity, stable  This report was finalized on 11/4/2024 8:03 AM by Dr. Emmanuel Miranda M.D on Workstation: [id number]      XR Chest 1 View    Result Date: 11/3/2024  Electronically signed by Bulmaro Abraham MD on 11-03-24 at 2259     Results for orders placed during the hospital encounter of 11/03/24    Adult Transthoracic Echo Complete W/ Cont if Necessary Per Protocol    Interpretation Summary    Left  ventricular systolic function is normal. Calculated left ventricular EF = 52.4%    Left ventricular wall thickness is consistent with mild septal asymmetric hypertrophy.    Left ventricular diastolic function is consistent with age.    Mild aortic valve stenosis is present.    Peak velocity of the flow distal to the aortic valve is 251 cm/s. Aortic valve maximum pressure gradient is 25 mmHg. Aortic valve mean pressure gradient is 14 mmHg. Aortic valve dimensionless index is 0.6 .    Mild mitral valve stenosis is present. The mitral valve mean gradient is 5 mmHg.    Estimated right ventricular systolic pressure from tricuspid regurgitation is normal (<35 mmHg).    Scheduled Meds:aspirin, 81 mg, Nasogastric, Daily  cefTRIAXone, 2,000 mg, Intravenous, Q24H  chlorhexidine, 15 mL, Mouth/Throat, Q12H  escitalopram, 10 mg, Nasogastric, Daily  folic acid, 400 mcg, Nasogastric, Daily  mupirocin, 1 Application, Each Nare, BID  pantoprazole, 40 mg, Intravenous, Q AM  sodium chloride, 10 mL, Intravenous, Q12H      Continuous Infusions:dexmedetomidine, 0.2-1.5 mcg/kg/hr, Last Rate: 0.2 mcg/kg/hr (11/05/24 1710)  norepinephrine, 0.02-0.3 mcg/kg/min, Last Rate: 0.24 mcg/kg/min (11/05/24 1223)  phenylephrine, 0.5-3 mcg/kg/min, Last Rate: 0.4 mcg/kg/min (11/05/24 9937)  sodium chloride 0.45 % 925 mL with sodium bicarbonate 8.4 % 75 mEq infusion, , Last Rate: 75 mL/hr at 11/04/24 0087      PRN Meds:  acetaminophen **OR** acetaminophen    fentaNYL citrate (PF)    nitroglycerin    sodium chloride    sodium chloride    Assessment & Plan   Assessment and Plan:         Acute pyelonephritis    ASSESSMENT:  Acute kidney injury  Chronic kidney disease, stage 3, with baseline creatinine roughly 1.0mg/dL; felt to be secondary to hypertension and advanced age  Metabolic acidosis  Hypokalemia  Right hydronephrosis with nephrolithiasis  Right distal ureteral calculi, s/p cysto with stent insertion 11/3/24  Sepsis  Acute hypoxemic respiratory  failure  Chronic diastolic heart failure    PLAN :     Renal function slightly better than yesterday   cc in last 24 hr   F/U with UOP hopefully may be improving in next 24 hr   EMMA most likely multifactorial and secondary to hemodynamic insults, sepsis and right hydronephrosis, most likely has ATN  Making urine. Urine studies c/w prerenal picture   Proteinuria 1.5 gm f/u with SPEP will order  Supplement potassium. Repeat labs at noon. Nursing instructed to call with results  Metabolic acidosis, with lactic acidosis, some worsening may be due to NS administration  Cont IVF 1/2 NS with 75mEq bicarbonate at 75cc/hr  Follow up with repeat labs at noon  Monitor volume closely given diastolic heart failure. Clinically no evidence of increased volume will dc IVF in aM   Keep MAP>65, currently on Levophed and Neosynephrine  Antibiotics as per primary team. Currently on Rocephin  Will request strict I/Os, daily labs  Discussed in detail with RN  No urgent need for RRT but will follow closely,         Electronically signed by Brian Laura MD,   [facility] kidney consultant  [phone]  11/5/2024  17:43 EST

## 2024-11-05 NOTE — PROGRESS NOTES
"UROLOGY PROGRESS NOTE    Remains intubated on levo/joan     Lab Results   Component Value Date    CREATININE 1.99 (H) 11/05/2024    WBC 27.79 (H) 11/05/2024    HGB 10.7 (L) 11/05/2024      Susceptibility data from last 90 days.  Collected Specimen Info Organism   11/03/24 Blood from Arm, Right Gram Negative Bacilli         Results from last 7 days   Lab Units 11/03/24  1745   COLOR UA  Yellow   CLARITY UA  Cloudy*   BILIRUBIN UA  Negative   KETONES UA  Trace*   PH, URINE  <=5.0   UROBILINOGEN UA  0.2 E.U./dL   LEUKOCYTES UA  Trace*   NITRITE UA  Negative   BACTERIA UA /HPF None Seen       Intake/Output:    Intake/Output Summary (Last 24 hours) at 11/5/2024 0736  Last data filed at 11/5/2024 0723  Gross per 24 hour   Intake 2103.86 ml   Output 1080 ml   Net 1023.86 ml       Exam:  /60   Pulse 89   Temp 98.4 °F (36.9 °C)   Resp 22   Ht 154 cm (60.63\")   Wt 78 kg (171 lb 15.3 oz)   SpO2 96%   BMI 32.89 kg/m²       : nj in place clear urine     Assessment:    Acute pyelonephritis    Urosepsis  Right ureteral stent    -s/p right ureteral stent 11/3/24  -Bcx 11/3/24 - klebsiella      Plan:    -Continue abx per primary team  -No additional urologic intervention at this time  -Patient will require definitve stone treatment via ureteroscopy on outpatient basis once acute issues resolve    Estuardo Falcon MD   "

## 2024-11-05 NOTE — PROGRESS NOTES
"  Daily Progress Note.   [facility] CORONARY CARE  11/5/2024    Patient:  Name:  Kristyn Lugo  MRN:  [id number]  1940  84 y.o.  female         CC: septic shock resp failure on mech vent with pyelonephritis throughout  Summary:  Per Dr Catherine:  \"84-year-old female who presented with sepsis and found to be in septic shock secondary to acute pyelonephritis and underwent cystoscopy with stent placement for ureteral calculi. Came back to the ICU intubated requiring vasopressor support. Continue antibiotics, Klebsiella noted on blood cultures by PCR. MAP goal greater than 65. Daily sedation holidays, daily ABGs. \"    Interval History:  Maintains on mechanical ventilation vasopressors weaning unable to give reliable history.  Discussed with bedside nurse.        Physical Exam:  /60   Pulse 86   Temp 98.4 °F (36.9 °C)   Resp 26   Ht 154 cm (60.63\")   Wt 78 kg (171 lb 15.3 oz)   SpO2 96%   BMI 32.89 kg/m²   Body mass index is 32.89 kg/m².    Intake/Output Summary (Last 24 hours) at 11/5/2024 1427  Last data filed at 11/5/2024 1230  Gross per 24 hour   Intake 1903.86 ml   Output 1020 ml   Net 883.86 ml   Vent Settings          Resp Rate (Set): 20     FiO2 (%): 30 %  PEEP/CPAP (cm H2O): 5 cm H20    Minute Ventilation (L/min) (Obs): 8.17 L/min  Resp Rate (Observed) Vent: 20     I:E Ratio (Obs): 1:4.2    PIP Observed (cm H2O): 28 cm H2O  Plateau Pressure (cm H2O): 0 cm H2O  Driving Pressure (cm H2O): -5 cm H2O    General appearance: ill on mech vent  Eyes: anicteric sclerae,  ;    HENT: Atraumatic; oropharynx +ET tube LIJ CVC  Lungs: javier mech synchronous,   CV: RRR, no rub   Abdomen: obese soft non rigid  Extremities: mild  peripheral edema   Skin: WWP no diffuse visible rash  Psych/neuro sedated on mech vent    Data Review:  Notable Labs:  Results from last 7 days   Lab Units 11/05/24  0322 11/04/24  1203 11/04/24  0526 11/03/24  1546   WBC 10*3/mm3 27.79* 35.25* 32.40* 17.23* "   HEMOGLOBIN g/dL 10.7* 11.7* 11.6* 10.9*   PLATELETS 10*3/mm3 103* 156 182 158     Results from last 7 days   Lab Units 11/05/24 0322 11/04/24  1823 11/04/24  1203 11/04/24  0526 11/03/24  1546   SODIUM mmol/L 140 144 139 141 140   POTASSIUM mmol/L 3.4* 3.7 3.8 2.9* 3.6   CHLORIDE mmol/L 108* 109* 109* 109* 107   CO2 mmol/L 18.0* 17.5* 15.5* 15.0* 19.9*   BUN mg/dL 39* 39* 37* 36* 30*   CREATININE mg/dL 1.99* 2.15* 2.54* 2.41* 2.17*   GLUCOSE mg/dL 118* 128* 136* 126* 97   CALCIUM mg/dL 7.1* 7.6* 7.6* 7.9* 8.4*   MAGNESIUM mg/dL  --   --   --  2.0 1.5*   PHOSPHORUS mg/dL  --   --   --  3.2  --    Estimated Creatinine Clearance: 19.3 mL/min (A) (by C-G formula based on SCr of 1.99 mg/dL (H)).    Results from last 7 days   Lab Units 11/05/24 0322 11/04/24 1823 11/04/24 1203 11/04/24 0526 11/03/24  1800 11/03/24  1546   AST (SGOT) U/L  --   --  42*  --   --  33*   ALT (SGPT) U/L  --   --  22  --   --  18   PROCALCITONIN ng/mL  --   --   --   --   --  93.60*   LACTATE mmol/L 1.6 2.1* 2.2*  --    < > 4.9*   PLATELETS 10*3/mm3 103*  --  156 182  --  158    < > = values in this interval not displayed.       Results from last 7 days   Lab Units 11/05/24  0703 11/05/24  0334 11/04/24  1207 11/04/24  0248 11/03/24  2248 11/03/24  1558   PH, ARTERIAL pH units 7.364 7.322* 7.285* 7.251* 7.290* 7.396   PCO2, ARTERIAL mm Hg 35.3 38.2 36.8 40.3 34.7* 29.6*   PO2 ART mm Hg 83.9 80.1 107.3* 91.5 298.5* 88.1   HCO3 ART mmol/L 20.2* 19.8* 17.5* 17.7* 16.7* 18.2*       Imaging:  Reviewed chest images personally from past 3 days    ASSESSMENT  /  PLAN:  Right hydronephrosis with nephrolithiasis  Right distal ureteral calculi s/p cysto with stent insertion 11/3/24  EMMA  Sepsis vs hypovolemic shock  Hypotension  Elevated troponin  Elevated proBNP  Acute hypoxemic respiratory failure  Valvular heart disease  Chronic diastolic heart failure  Anemia  Chronic constipation vs diarrhea  Hx of HTN  Aneurysm of ascending  aorta  HLD    Continue vasopressors wean as able  Continue ceftriaxone  Follow blood cultures  Continuous IV fluids  Bicarb drip as guided by nephrology  Renal afunction improving  Hemodynamic improving    Mechanical ventilation reviewed chest x-ray reviewed.  Adjustments as appropriate.    Cards not reviewed    Discussed with bedside nursing.    Appreciate consultants.  Notes reviewed    DW family at bedside    Sbt in am    Total critical care time was 41 minutes, excluding any separately billable procedure time.  Time did not overlap with any other provider.     Electronically signed by Neymar Gerber MD, 11/05/24, 2:39 PM EST.

## 2024-11-05 NOTE — PROGRESS NOTES
LOS: 2 days   Patient Care Team:  Sean Fisher MD as PCP - General (Internal Medicine)    Chief Complaint: Follow-up type II NSTEMI, chronic diastolic CHF, aortic insufficiency.    Interval History: Remains intubated and sedated.  She remains on pressor support with José Miguel-Synephrine and Levophed.  In sinus rhythm.  No other acute events.    Vital Signs:  Temp:  [96.6 °F (35.9 °C)-99.7 °F (37.6 °C)] 98.8 °F (37.1 °C)  Heart Rate:  [] 92  Resp:  [22-28] 22  BP: (104-119)/(52-60) 104/60  FiO2 (%):  [30 %-40 %] 30 %    Intake/Output Summary (Last 24 hours) at 11/5/2024 0946  Last data filed at 11/5/2024 0845  Gross per 24 hour   Intake 1903.86 ml   Output 1075 ml   Net 828.86 ml       Physical Exam:   General Appearance:    Intubated and sedated   Lungs:     Clear to auscultation bilaterally     Heart:    Regular rhythm and normal rate.  II/VI SM throughout.    Abdomen:     Soft, nontender, nondistended.    Extremities:   No clubbing, cyanosis, or edema.     Results Review:    Results from last 7 days   Lab Units 11/05/24  0322   SODIUM mmol/L 140   POTASSIUM mmol/L 3.4*   CHLORIDE mmol/L 108*   CO2 mmol/L 18.0*   BUN mg/dL 39*   CREATININE mg/dL 1.99*   GLUCOSE mg/dL 118*   CALCIUM mg/dL 7.1*     Results from last 7 days   Lab Units 11/04/24  1203 11/03/24  1800 11/03/24  1546   HSTROP T ng/L 268* 230* 274*     Results from last 7 days   Lab Units 11/05/24  0322   WBC 10*3/mm3 27.79*   HEMOGLOBIN g/dL 10.7*   HEMATOCRIT % 31.5*   PLATELETS 10*3/mm3 103*     Results from last 7 days   Lab Units 11/03/24  1546   INR  1.24*         Results from last 7 days   Lab Units 11/04/24  0526   MAGNESIUM mg/dL 2.0           I reviewed the patient's new clinical results.        Assessment:  1.  Klebsiella bacteremia and septic shock  2.  Acute pyelonephritis  3.  Right ureteral calculi with obstruction hydronephrosis, status post cystoscopy and stent placement on 11/3/2024  4.  Acute kidney injury with stage III chronic  kidney disease  5.  Acute hypoxic respiratory failure  6.  Type II NSTEMI secondary to #1, #2, and #4.  7.  Chronic diastolic CHF  8.  Moderate to severe aortic insufficiency by echo on 10/30/2024.  Moderate by my viewing of the echo on 11/4/2024.  9.  Ascending aortic aneurysm, 4.8 cm by CTA on 10/30/2024  10.  Left anterior fascicular block by EKG  11.  Mild aortic stenosis    Plan:  -I reviewed the echocardiogram.  Her wall motion is normal.  I felt that the aortic insufficiency was more in the moderate range, which would be consistent with her prior echocardiograms.    -Motion on the echo, this is a type II NSTEMI secondary to the septic shock, pyelonephritis, and acute kidney injury.    -She is on aspirin 81 mg/day.  Obviously, she cannot be on beta-blockers or other cardiac medications currently because of her hypotension and need for pressor support.    -No evidence of pulmonary edema or CHF on chest x-ray (or on exam).  Continue gentle hydration per nephrology.    Be Estrada MD  11/05/24  09:46 EST

## 2024-11-05 NOTE — PLAN OF CARE
Goal Outcome Evaluation:   Patient restless on the vent overnight. Provider called multiple times for increased sedation/extra pushes of IV medication. Patient will follow commands when she is awake. Urine output 30-60ml/hr. José Miguel titrated down. CHG bath completed at 0400.

## 2024-11-06 ENCOUNTER — APPOINTMENT (OUTPATIENT)
Dept: GENERAL RADIOLOGY | Facility: HOSPITAL | Age: 84
DRG: 853 | End: 2024-11-06
Payer: MEDICARE

## 2024-11-06 LAB
ANION GAP SERPL CALCULATED.3IONS-SCNC: 13.5 MMOL/L (ref 5–15)
ANISOCYTOSIS BLD QL: ABNORMAL
ARTERIAL PATENCY WRIST A: ABNORMAL
ARTERIAL PATENCY WRIST A: ABNORMAL
ATMOSPHERIC PRESS: 747.2 MMHG
ATMOSPHERIC PRESS: 750.6 MMHG
BACTERIA SPEC AEROBE CULT: ABNORMAL
BASE EXCESS BLDA CALC-SCNC: -3.5 MMOL/L (ref 0–2)
BASE EXCESS BLDA CALC-SCNC: -5.7 MMOL/L (ref 0–2)
BASOPHILS # BLD MANUAL: 0 10*3/MM3 (ref 0–0.2)
BASOPHILS NFR BLD MANUAL: 0 % (ref 0–1.5)
BDY SITE: ABNORMAL
BDY SITE: ABNORMAL
BUN SERPL-MCNC: 42 MG/DL (ref 8–23)
BUN/CREAT SERPL: 24.7 (ref 7–25)
CALCIUM SPEC-SCNC: 7.3 MG/DL (ref 8.6–10.5)
CHLORIDE SERPL-SCNC: 111 MMOL/L (ref 98–107)
CO2 BLDA-SCNC: 21.5 MMOL/L (ref 23–27)
CO2 BLDA-SCNC: 21.9 MMOL/L (ref 23–27)
CO2 SERPL-SCNC: 18.5 MMOL/L (ref 22–29)
CREAT SERPL-MCNC: 1.7 MG/DL (ref 0.57–1)
DEPRECATED RDW RBC AUTO: 43 FL (ref 37–54)
DEVICE COMMENT: ABNORMAL
EGFRCR SERPLBLD CKD-EPI 2021: 29.3 ML/MIN/1.73
EOSINOPHIL # BLD MANUAL: 0 10*3/MM3 (ref 0–0.4)
EOSINOPHIL NFR BLD MANUAL: 0 % (ref 0.3–6.2)
ERYTHROCYTE [DISTWIDTH] IN BLOOD BY AUTOMATED COUNT: 13.5 % (ref 12.3–15.4)
GLUCOSE BLDC GLUCOMTR-MCNC: 114 MG/DL (ref 70–130)
GLUCOSE BLDC GLUCOMTR-MCNC: 128 MG/DL (ref 70–130)
GLUCOSE BLDC GLUCOMTR-MCNC: 94 MG/DL (ref 70–130)
GLUCOSE SERPL-MCNC: 124 MG/DL (ref 65–99)
GRAM STN SPEC: ABNORMAL
GRAM STN SPEC: ABNORMAL
HCO3 BLDA-SCNC: 20.3 MMOL/L (ref 22–28)
HCO3 BLDA-SCNC: 20.9 MMOL/L (ref 22–28)
HCT VFR BLD AUTO: 29.8 % (ref 34–46.6)
HEMODILUTION: NO
HEMODILUTION: NO
HGB BLD-MCNC: 9.9 G/DL (ref 12–15.9)
INHALED O2 CONCENTRATION: 21 %
INHALED O2 CONCENTRATION: 30 %
ISOLATED FROM: ABNORMAL
LYMPHOCYTES # BLD MANUAL: 0.4 10*3/MM3 (ref 0.7–3.1)
LYMPHOCYTES NFR BLD MANUAL: 3 % (ref 5–12)
MCH RBC QN AUTO: 28.9 PG (ref 26.6–33)
MCHC RBC AUTO-ENTMCNC: 33.2 G/DL (ref 31.5–35.7)
MCV RBC AUTO: 87.1 FL (ref 79–97)
MEAN AIRWAY PRESSURE: 10
MODALITY: ABNORMAL
MODALITY: ABNORMAL
MONOCYTES # BLD: 0.6 10*3/MM3 (ref 0.1–0.9)
NEUTROPHILS # BLD AUTO: 18.97 10*3/MM3 (ref 1.7–7)
NEUTROPHILS NFR BLD MANUAL: 95 % (ref 42.7–76)
O2 A-A PPRESDIFF RESPIRATORY: 0.4 MMHG
O2 A-A PPRESDIFF RESPIRATORY: 0.5 MMHG
PAW @ PEAK INSP FLOW SETTING VENT: 12 CMH2O
PAW @ PEAK INSP FLOW SETTING VENT: 24 CMH2O
PCO2 BLDA: 34.1 MM HG (ref 35–45)
PCO2 BLDA: 40.8 MM HG (ref 35–45)
PEEP RESPIRATORY: 5 CM[H2O]
PEEP RESPIRATORY: 5 CM[H2O]
PH BLDA: 7.3 PH UNITS (ref 7.35–7.45)
PH BLDA: 7.4 PH UNITS (ref 7.35–7.45)
PLAT MORPH BLD: NORMAL
PLATELET # BLD AUTO: 84 10*3/MM3 (ref 140–450)
PMV BLD AUTO: 12.5 FL (ref 6–12)
PO2 BLD: 247 MM[HG] (ref 0–500)
PO2 BLD: 284 MM[HG] (ref 0–500)
PO2 BLDA: 59.7 MM HG (ref 80–100)
PO2 BLDA: 74 MM HG (ref 80–100)
POIKILOCYTOSIS BLD QL SMEAR: ABNORMAL
POLYCHROMASIA BLD QL SMEAR: ABNORMAL
POTASSIUM SERPL-SCNC: 3.5 MMOL/L (ref 3.5–5.2)
PSV: 7 CMH2O
RBC # BLD AUTO: 3.42 10*6/MM3 (ref 3.77–5.28)
SAO2 % BLDCOA: 90.6 % (ref 92–98.5)
SAO2 % BLDCOA: 93.2 % (ref 92–98.5)
SET MECH RESP RATE: 20
SODIUM SERPL-SCNC: 143 MMOL/L (ref 136–145)
TOTAL RATE: 19 BREATHS/MINUTE
TOTAL RATE: 20 BREATHS/MINUTE
VARIANT LYMPHS NFR BLD MANUAL: 2 % (ref 19.6–45.3)
VENTILATOR MODE: ABNORMAL
VENTILATOR MODE: AC
VT ON VENT VENT: 400 ML
WBC MORPH BLD: NORMAL
WBC NRBC COR # BLD AUTO: 19.97 10*3/MM3 (ref 3.4–10.8)

## 2024-11-06 PROCEDURE — 85025 COMPLETE CBC W/AUTO DIFF WBC: CPT | Performed by: INTERNAL MEDICINE

## 2024-11-06 PROCEDURE — 84165 PROTEIN E-PHORESIS SERUM: CPT

## 2024-11-06 PROCEDURE — 25010000002 FUROSEMIDE PER 20 MG

## 2024-11-06 PROCEDURE — 25010000002 POTASSIUM CHLORIDE 10 MEQ/100ML SOLUTION

## 2024-11-06 PROCEDURE — 99232 SBSQ HOSP IP/OBS MODERATE 35: CPT | Performed by: INTERNAL MEDICINE

## 2024-11-06 PROCEDURE — 94799 UNLISTED PULMONARY SVC/PX: CPT

## 2024-11-06 PROCEDURE — 86334 IMMUNOFIX E-PHORESIS SERUM: CPT

## 2024-11-06 PROCEDURE — 94761 N-INVAS EAR/PLS OXIMETRY MLT: CPT

## 2024-11-06 PROCEDURE — 85007 BL SMEAR W/DIFF WBC COUNT: CPT | Performed by: INTERNAL MEDICINE

## 2024-11-06 PROCEDURE — 25010000002 CEFTRIAXONE PER 250 MG: Performed by: INTERNAL MEDICINE

## 2024-11-06 PROCEDURE — 82803 BLOOD GASES ANY COMBINATION: CPT

## 2024-11-06 PROCEDURE — 83521 IG LIGHT CHAINS FREE EACH: CPT

## 2024-11-06 PROCEDURE — 25010000002 FENTANYL CITRATE (PF) 50 MCG/ML SOLUTION: Performed by: HOSPITALIST

## 2024-11-06 PROCEDURE — 84155 ASSAY OF PROTEIN SERUM: CPT

## 2024-11-06 PROCEDURE — 82784 ASSAY IGA/IGD/IGG/IGM EACH: CPT

## 2024-11-06 PROCEDURE — 80048 BASIC METABOLIC PNL TOTAL CA: CPT | Performed by: INTERNAL MEDICINE

## 2024-11-06 PROCEDURE — 82948 REAGENT STRIP/BLOOD GLUCOSE: CPT

## 2024-11-06 PROCEDURE — 94760 N-INVAS EAR/PLS OXIMETRY 1: CPT

## 2024-11-06 PROCEDURE — 82803 BLOOD GASES ANY COMBINATION: CPT | Performed by: INTERNAL MEDICINE

## 2024-11-06 PROCEDURE — 71045 X-RAY EXAM CHEST 1 VIEW: CPT

## 2024-11-06 PROCEDURE — 94003 VENT MGMT INPAT SUBQ DAY: CPT

## 2024-11-06 RX ORDER — POTASSIUM CHLORIDE 7.45 MG/ML
10 INJECTION INTRAVENOUS
Status: COMPLETED | OUTPATIENT
Start: 2024-11-06 | End: 2024-11-06

## 2024-11-06 RX ORDER — FUROSEMIDE 10 MG/ML
20 INJECTION INTRAMUSCULAR; INTRAVENOUS ONCE
Status: COMPLETED | OUTPATIENT
Start: 2024-11-06 | End: 2024-11-06

## 2024-11-06 RX ORDER — SODIUM CHLORIDE 0.9 % (FLUSH) 0.9 %
10 SYRINGE (ML) INJECTION AS NEEDED
Status: DISCONTINUED | OUTPATIENT
Start: 2024-11-06 | End: 2024-11-15 | Stop reason: HOSPADM

## 2024-11-06 RX ORDER — SODIUM CHLORIDE 0.9 % (FLUSH) 0.9 %
10 SYRINGE (ML) INJECTION EVERY 12 HOURS SCHEDULED
Status: DISCONTINUED | OUTPATIENT
Start: 2024-11-06 | End: 2024-11-09

## 2024-11-06 RX ORDER — SODIUM CHLORIDE 0.9 % (FLUSH) 0.9 %
20 SYRINGE (ML) INJECTION AS NEEDED
Status: DISCONTINUED | OUTPATIENT
Start: 2024-11-06 | End: 2024-11-15 | Stop reason: HOSPADM

## 2024-11-06 RX ORDER — SODIUM CHLORIDE 9 MG/ML
40 INJECTION, SOLUTION INTRAVENOUS AS NEEDED
Status: DISCONTINUED | OUTPATIENT
Start: 2024-11-06 | End: 2024-11-15 | Stop reason: HOSPADM

## 2024-11-06 RX ADMIN — DEXMEDETOMIDINE HYDROCHLORIDE 0.4 MCG/KG/HR: 4 INJECTION, SOLUTION INTRAVENOUS at 20:28

## 2024-11-06 RX ADMIN — ESCITALOPRAM OXALATE 10 MG: 10 TABLET, FILM COATED ORAL at 09:00

## 2024-11-06 RX ADMIN — Medication 10 ML: at 10:00

## 2024-11-06 RX ADMIN — MUPIROCIN 1 APPLICATION: 20 OINTMENT TOPICAL at 09:00

## 2024-11-06 RX ADMIN — MUPIROCIN 1 APPLICATION: 20 OINTMENT TOPICAL at 20:31

## 2024-11-06 RX ADMIN — DEXMEDETOMIDINE HYDROCHLORIDE 1 MCG/KG/HR: 4 INJECTION, SOLUTION INTRAVENOUS at 06:16

## 2024-11-06 RX ADMIN — FENTANYL CITRATE 50 MCG: 50 INJECTION, SOLUTION INTRAMUSCULAR; INTRAVENOUS at 05:45

## 2024-11-06 RX ADMIN — Medication 10 ML: at 20:33

## 2024-11-06 RX ADMIN — PANTOPRAZOLE SODIUM 40 MG: 40 INJECTION, POWDER, FOR SOLUTION INTRAVENOUS at 05:46

## 2024-11-06 RX ADMIN — ASPIRIN 81 MG: 81 TABLET, CHEWABLE ORAL at 09:01

## 2024-11-06 RX ADMIN — CEFTRIAXONE 2000 MG: 2 INJECTION, POWDER, FOR SOLUTION INTRAMUSCULAR; INTRAVENOUS at 17:49

## 2024-11-06 RX ADMIN — FENTANYL CITRATE 50 MCG: 50 INJECTION, SOLUTION INTRAMUSCULAR; INTRAVENOUS at 17:48

## 2024-11-06 RX ADMIN — FENTANYL CITRATE 50 MCG: 50 INJECTION, SOLUTION INTRAMUSCULAR; INTRAVENOUS at 04:27

## 2024-11-06 RX ADMIN — DEXMEDETOMIDINE HYDROCHLORIDE 1.2 MCG/KG/HR: 4 INJECTION, SOLUTION INTRAVENOUS at 02:12

## 2024-11-06 RX ADMIN — FUROSEMIDE 20 MG: 10 INJECTION, SOLUTION INTRAMUSCULAR; INTRAVENOUS at 09:00

## 2024-11-06 RX ADMIN — Medication 400 MCG: at 09:01

## 2024-11-06 RX ADMIN — Medication 10 ML: at 09:02

## 2024-11-06 RX ADMIN — POTASSIUM CHLORIDE 10 MEQ: 7.46 INJECTION, SOLUTION INTRAVENOUS at 14:04

## 2024-11-06 RX ADMIN — FENTANYL CITRATE 50 MCG: 50 INJECTION, SOLUTION INTRAMUSCULAR; INTRAVENOUS at 21:13

## 2024-11-06 RX ADMIN — CHLORHEXIDINE GLUCONATE 15 ML: 1.2 RINSE ORAL at 20:33

## 2024-11-06 RX ADMIN — FENTANYL CITRATE 50 MCG: 50 INJECTION, SOLUTION INTRAMUSCULAR; INTRAVENOUS at 02:32

## 2024-11-06 RX ADMIN — CHLORHEXIDINE GLUCONATE 15 ML: 1.2 RINSE ORAL at 09:01

## 2024-11-06 RX ADMIN — FENTANYL CITRATE 50 MCG: 50 INJECTION, SOLUTION INTRAMUSCULAR; INTRAVENOUS at 06:54

## 2024-11-06 RX ADMIN — POTASSIUM CHLORIDE 10 MEQ: 7.46 INJECTION, SOLUTION INTRAVENOUS at 16:47

## 2024-11-06 RX ADMIN — FENTANYL CITRATE 50 MCG: 50 INJECTION, SOLUTION INTRAMUSCULAR; INTRAVENOUS at 01:19

## 2024-11-06 RX ADMIN — FENTANYL CITRATE 50 MCG: 50 INJECTION, SOLUTION INTRAMUSCULAR; INTRAVENOUS at 22:37

## 2024-11-06 RX ADMIN — FENTANYL CITRATE 50 MCG: 50 INJECTION, SOLUTION INTRAMUSCULAR; INTRAVENOUS at 14:12

## 2024-11-06 RX ADMIN — SODIUM BICARBONATE: 84 INJECTION, SOLUTION INTRAVENOUS at 04:28

## 2024-11-06 NOTE — PROGRESS NOTES
Nutrition Services    Patient Name:  Kristyn Lugo  YOB: 1940  MRN: [id number]  Admit Date:  11/3/2024    Pt being assessed for NPO x 3 days. Pt admitted to [facility] with acute pyelonephritis. Pt is orally intubated. Pt is now off pressors. RD visited pt and pt's daughter, Marcela, at bedside. Pt reportedly a good eater PTA - 1 good sized meal daily in addition to 2 smaller meals. Pt does not drink ONS. Pt's daughter reports UBW of ~154#. Pt's wt this admission ~ 170#. Net fluid gain of ~15.5L since admission per I/O documentation. NFPE completed and not consistent with nutrition diagnosis of malnutrition at this time using AND/ASPEN criteria.    OPAL to monitor for nutrition POC.     Electronically signed by:  Noemy Bennett RD  11/06/24 11:52 EST

## 2024-11-06 NOTE — CONSULTS
"Nutrition Services    Patient Name:  Kristyn Lugo  YOB: 1940  MRN: [id number]  Admit Date:  11/3/2024    Assessment Date:  11/06/24    Summary:   Cortrak consult, verbal TF assessment consult. Pt admitted to [facility] with diarrhea and generalized malaise. Upon admission, pt hypotensive. CT showed dilated R kidney with hydronephrosis and a stone in the R ureter. Pt s/p cystoscopy and stent placement. Pt febrile, tachycardic, and hypoxic, requiring oral intubation. Pt discussed in multidisciplinary rounds, pt waking up and able to follow commands. Pt remains drowsy. MD consulted RD for cortrak placement. RD removed OG and placed cortrak in gastric position via R nare, marked at 85 cm. Pt's daughter not present for cortrak placement, but RD visited pt's daughter at a later time and explained that cortrak in place for nutrition and medication administration.     Plan:  -Initiate Peptamen AF @ 30 mL/hr with 10 mL/hr FWF.  -Advance TF rate by 10 mL q4 until goal reached.  -GOAL: Peptamen AF @ 50 mL/hr FWF    CLINICAL NUTRITION ASSESSMENT      Reason for Assessment Cortrak Placement, TF Assessment     Diagnosis/Problem   Acute pyelonephritis.  Acute hypoxemic respiratory failure, HTN, HLD, chronic diastolic heart failure.    Medical/Surgical History Past Medical History:   Diagnosis Date    Aortic valve regurgitation     Chronic kidney disease     Low back pain        Past Surgical History:   Procedure Laterality Date    CYSTOSCOPY W/ URETERAL STENT PLACEMENT Right 11/3/2024    Procedure: CYSTOSCOPY URETERAL CATHETER/STENT INSERTION;  Surgeon: Tony Ledesma MD;  Location: [facility];  Service: Urology;  Laterality: Right;        Anthropometrics        Current Height  Current Weight  BMI kg/m2 Height: 154 cm (60.63\")  Weight: 78 kg (171 lb 15.3 oz) (11/04/24 1840)  Body mass index is 32.89 kg/m².   Adjusted BMI (if applicable)    BMI Category Obese, Class I (30 - 34.9)   Ideal Body Weight " (IBW) 105#   Usual Body Weight (UBW) 154# per pt's daughter   Weight Trend Stable, Amount/Timeframe: since admission, ~15.5L fluid gain since admission per I/O documentation    Weight History Wt Readings from Last 30 Encounters:   11/04/24 1840 78 kg (171 lb 15.3 oz)   11/04/24 0600 78.4 kg (172 lb 13.5 oz)   11/03/24 2000 77 kg (169 lb 12.1 oz)   11/03/24 1429 74.8 kg (164 lb 14.5 oz)   10/16/23 0945 74.8 kg (165 lb)   10/05/23 1505 74.4 kg (164 lb)        Estimated/Assessed Needs        Energy Requirements    Weight for Calculation 78 kg   Method for Estimation  other method: 17 kcal/kg current body wt (78 kg) - [facility] 2010   EST Needs (kcal/day) 2465-1084 kcal/day       Protein Requirements    Weight for Calculation 47.7 kg, IBW   EST Protein Needs (g/kg) 1.5 - 2.0 gm/kg   EST Daily Needs (g/day) 72-95 g/day        Fluid Requirements     Method for Estimation Other:  500 mL + total fluid output    Estimated Needs (mL/day)        Fluid Deficit    Current Na Level (mEq/L)    Desired Na Level (mEq/L)    Estimated Fluid Deficit (L)       Labs       Pertinent Labs    Results from last 7 days   Lab Units 11/06/24  0443 11/05/24  0322 11/04/24  1823 11/04/24  1203 11/04/24  0526 11/03/24  1546   SODIUM mmol/L 143 140 144 139   < > 140   POTASSIUM mmol/L 3.5 3.4* 3.7 3.8   < > 3.6   CHLORIDE mmol/L 111* 108* 109* 109*   < > 107   CO2 mmol/L 18.5* 18.0* 17.5* 15.5*   < > 19.9*   BUN mg/dL 42* 39* 39* 37*   < > 30*   CREATININE mg/dL 1.70* 1.99* 2.15* 2.54*   < > 2.17*   CALCIUM mg/dL 7.3* 7.1* 7.6* 7.6*   < > 8.4*   BILIRUBIN mg/dL  --   --   --  0.6  --  0.4   ALK PHOS U/L  --   --   --  102  --  53   ALT (SGPT) U/L  --   --   --  22  --  18   AST (SGOT) U/L  --   --   --  42*  --  33*   GLUCOSE mg/dL 124* 118* 128* 136*   < > 97    < > = values in this interval not displayed.     Results from last 7 days   Lab Units 11/06/24  0443 11/05/24  0322 11/04/24  1203 11/04/24  0526 11/03/24  1546   MAGNESIUM mg/dL  --   --   " --  2.0 1.5*   PHOSPHORUS mg/dL  --   --   --  3.2  --    HEMOGLOBIN g/dL 9.9*   < > 11.7* 11.6* 10.9*   HEMATOCRIT % 29.8*   < > 34.2 34.0 32.3*   WBC 10*3/mm3 19.97*   < > 35.25* 32.40* 17.23*   ALBUMIN g/dL  --   --  2.9*  --  3.1*    < > = values in this interval not displayed.     Results from last 7 days   Lab Units 11/06/24  0443 11/05/24  0322 11/04/24  1203 11/04/24  0526 11/03/24  1546   INR   --   --   --   --  1.24*   PLATELETS 10*3/mm3 84* 103* 156 182 158     No results found for: \"COVID19\"  No results found for: \"HGBA1C\"       Medications           Scheduled Medications aspirin, 81 mg, Nasogastric, Daily  cefTRIAXone, 2,000 mg, Intravenous, Q24H  chlorhexidine, 15 mL, Mouth/Throat, Q12H  escitalopram, 10 mg, Nasogastric, Daily  folic acid, 400 mcg, Nasogastric, Daily  mupirocin, 1 Application, Each Nare, BID  pantoprazole, 40 mg, Intravenous, Q AM  potassium chloride, 10 mEq, Intravenous, Q1H  sodium chloride, 10 mL, Intravenous, Q12H  sodium chloride, 10 mL, Intravenous, Q12H  sodium chloride, 10 mL, Intravenous, Q12H  sodium chloride, 10 mL, Intravenous, Q12H  sodium chloride, 10 mL, Intravenous, Q12H       Infusions dexmedetomidine, 0.2-1.5 mcg/kg/hr, Last Rate: 0.2 mcg/kg/hr (11/06/24 1327)  norepinephrine, 0.02-0.3 mcg/kg/min, Last Rate: Stopped (11/06/24 0400)  phenylephrine, 0.5-3 mcg/kg/min, Last Rate: Stopped (11/05/24 1720)  sodium chloride 0.45 % 925 mL with sodium bicarbonate 8.4 % 75 mEq infusion, , Last Rate: 50 mL/hr at 11/06/24 0428       PRN Medications   acetaminophen **OR** acetaminophen    fentaNYL citrate (PF)    nitroglycerin    sodium chloride    sodium chloride    sodium chloride    sodium chloride    sodium chloride     Physical Findings          General Findings sedate, ventilator support   Oral/Mouth Cavity WDL   Edema  1+ (trace), 2+ (mild)  1+ generalized  2+ feet   Gastrointestinal last bowel movement: 11/3, diarrhea   Skin  skin intact   Tubes/Drains/Lines Cortrak, " gastric placement    NFPE Date Completed: 11/6  NFPE completed and not consistent with nutrition diagnosis of malnutrition at this time using AND/ASPEN criteria      --  Current Nutrition Orders & Evaluation of Intake       Oral Nutrition     Food Allergies NKFA   Current PO Diet NPO Diet NPO Type: Strict NPO   Supplement n/a   PO Evaluation     % PO Intake NPO    Factors Affecting Intake: Other: oral intubation     PES STATEMENT / NUTRITION DIAGNOSIS      Nutrition Dx Problem  Problem: Swallowing Difficulty  Etiology: Factors Affecting Nutrition - oral intubation     Signs/Symptoms: EN Intake/Delivery   --  NUTRITION INTERVENTION / PLAN OF CARE      Intervention Goal(s) Initiate TF/PN         RD Intervention/Action Await initiation of EN/PN         Prescription/Orders:       PO Diet       Supplements       Enteral Nutrition Initial Goal:  *initial goal conservative d/t risk of RFS     Peptamen AF at 30 mL/hr + 10 mL/hr water flush      End Goal:    Peptamen AF at 50 mL/hr + water flush per clinical course     Calories  1320 kcals (100% lower end of range)    Protein  84 g (within range)    Free water  891 mL   Flushes       The above end goal rate is for 22 hrs/day to assume interruptions for ADLs. Water flushes adjusted based on clinical picture + Rx flushes/IV fluids     Specialized formula chosen r/t increased protein need and overall low kcal need in critical care setting.         Parenteral Nutrition    New Prescription Ordered? Yes   --      Monitor/Evaluation Per protocol, Pertinent labs, EN delivery/tolerance, Weight, Hemodynamic stability   Discharge Plan/Needs Pending clinical course   --    RD to follow per protocol.      Electronically signed by:  Noemy Bennett RD  11/06/24 15:02 EST

## 2024-11-06 NOTE — PROGRESS NOTES
LOS: 3 days   Patient Care Team:  Sena Fisher MD as PCP - General (Internal Medicine)    Chief Complaint: Follow-up type II NSTEMI, chronic diastolic CHF, aortic insufficiency.    Interval History: Remains intubated, but was responsive and off sedation.  She was only requiring minimal pressor support this morning.  No other acute events.    Vital Signs:  Temp:  [96.8 °F (36 °C)-99.1 °F (37.3 °C)] 97.5 °F (36.4 °C)  Heart Rate:  [65-96] 65  Resp:  [15-26] 15  BP: (101-151)/(61-77) 101/61  FiO2 (%):  [21 %-30 %] 30 %    Intake/Output Summary (Last 24 hours) at 11/6/2024 1223  Last data filed at 11/6/2024 0700  Gross per 24 hour   Intake 00297.83 ml   Output 883 ml   Net 9573.83 ml       Physical Exam:   General Appearance:    Intubated and arouses.   Lungs:     Clear to auscultation bilaterally     Heart:    Regular rhythm and normal rate.  II/VI SM throughout.    Abdomen:     Soft, nontender, nondistended.    Extremities:   No clubbing, cyanosis, or edema.     Results Review:    Results from last 7 days   Lab Units 11/06/24  0443   SODIUM mmol/L 143   POTASSIUM mmol/L 3.5   CHLORIDE mmol/L 111*   CO2 mmol/L 18.5*   BUN mg/dL 42*   CREATININE mg/dL 1.70*   GLUCOSE mg/dL 124*   CALCIUM mg/dL 7.3*     Results from last 7 days   Lab Units 11/04/24  1203 11/03/24  1800 11/03/24  1546   HSTROP T ng/L 268* 230* 274*     Results from last 7 days   Lab Units 11/06/24  0443   WBC 10*3/mm3 19.97*   HEMOGLOBIN g/dL 9.9*   HEMATOCRIT % 29.8*   PLATELETS 10*3/mm3 84*     Results from last 7 days   Lab Units 11/03/24  1546   INR  1.24*         Results from last 7 days   Lab Units 11/04/24  0526   MAGNESIUM mg/dL 2.0           I reviewed the patient's new clinical results.        Assessment:  1.  Klebsiella bacteremia and septic shock  2.  Acute pyelonephritis  3.  Right ureteral calculi with obstruction hydronephrosis, status post cystoscopy and stent placement on 11/3/2024  4.  Acute kidney injury with stage III chronic  kidney disease  5.  Acute hypoxic respiratory failure  6.  Type II NSTEMI secondary to #1, #2, and #4.  7.  Chronic diastolic CHF  8.  Moderate to severe aortic insufficiency by echo on 10/30/2024.  Moderate by my viewing of the echo on 11/4/2024.  9.  Ascending aortic aneurysm, 4.8 cm by CTA on 10/30/2024  10.  Left anterior fascicular block by EKG  11.  Mild aortic stenosis  12.  Thrombocytopenia    Plan:  -I reviewed the echocardiogram.  Her wall motion is normal.  I felt that the aortic insufficiency was more in the moderate range, which would be consistent with her prior echocardiograms.    -Normal wall motion on the echo.  This is a type II NSTEMI secondary to the septic shock, pyelonephritis, and acute kidney injury.    -She is on aspirin 81 mg/day.      -Nothing further to add from a cardiac perspective currently.  Cardiology will sign off.  Please call back if needed.    Be Estrada MD  11/06/24  12:23 EST

## 2024-11-06 NOTE — PROGRESS NOTES
"   LOS: 3 days   Patient Care Team:  Sean Fisher MD as PCP - General (Internal Medicine)      Subjective   Interval History: Intubated. Resting comfortably.    Objective     ROS   12 POINT NEG ROS PERTINENT IN HPI      Vital Signs  Temp:  [96.8 °F (36 °C)-99.1 °F (37.3 °C)] 98.9 °F (37.2 °C)  Heart Rate:  [67-96] 67  Resp:  [20-26] 21  BP: (101-151)/(61-77) 101/61  FiO2 (%):  [21 %-30 %] 21 %      Intake/Output Summary (Last 24 hours) at 11/6/2024 0818  Last data filed at 11/6/2024 0700  Gross per 24 hour   Intake 73899.83 ml   Output 1073 ml   Net 9383.83 ml       Flowsheet Rows      Flowsheet Row First Filed Value   Admission Height 154.9 cm (60.98\") Documented at 11/03/2024 1429   Admission Weight 74.8 kg (164 lb 14.5 oz) Documented at 11/03/2024 1429            Physical Exam:     General appearance: intubated, sedated.        Lab Results (all)       Procedure Component Value Units Date/Time    Manual Differential [[id number]]  (Abnormal) Collected: 11/06/24 0443    Specimen: Blood Updated: 11/06/24 0616     Neutrophil % 95.0 %      Lymphocyte % 2.0 %      Monocyte % 3.0 %      Eosinophil % 0.0 %      Basophil % 0.0 %      Neutrophils Absolute 18.97 10*3/mm3      Lymphocytes Absolute 0.40 10*3/mm3      Monocytes Absolute 0.60 10*3/mm3      Eosinophils Absolute 0.00 10*3/mm3      Basophils Absolute 0.00 10*3/mm3      Anisocytosis Mod/2+     Poikilocytes Mod/2+     Polychromasia Slight/1+     WBC Morphology Normal     Platelet Morphology Normal    Blood Culture - Blood, Arm, Right [[id number]]  (Abnormal)  (Susceptibility) Collected: 11/03/24 1644    Specimen: Blood from Arm, Right Updated: 11/06/24 0607     Blood Culture Klebsiella pneumoniae ssp pneumoniae     Isolated from Aerobic and Anaerobic Bottles     Gram Stain Anaerobic Bottle Gram negative bacilli      Aerobic Bottle Gram negative bacilli    Narrative:      Less than seven (7) mL's of blood was collected.  Insufficient quantity may yield false " negative results.    Susceptibility        Klebsiella pneumoniae ssp pneumoniae      MARCIANO      Amoxicillin + Clavulanate Susceptible      Ampicillin Resistant      Ampicillin + Sulbactam Susceptible      Cefepime Susceptible      Ceftazidime Susceptible      Ceftriaxone Susceptible      Gentamicin Susceptible      Levofloxacin Susceptible      Piperacillin + Tazobactam Susceptible      Trimethoprim + Sulfamethoxazole Susceptible                       Susceptibility Comments       Klebsiella pneumoniae ssp pneumoniae    Cefazolin sensitivity will not be reported for Enterobacteriaceae in non-urine isolates. If cefazolin is preferred, please call the microbiology lab to request an E-test.  With the exception of urinary-sourced infections, aminoglycosides should not be used as monotherapy.               Basic Metabolic Panel [[id number]]  (Abnormal) Collected: 11/06/24 0443    Specimen: Blood Updated: 11/06/24 0542     Glucose 124 mg/dL      BUN 42 mg/dL      Creatinine 1.70 mg/dL      Sodium 143 mmol/L      Potassium 3.5 mmol/L      Chloride 111 mmol/L      CO2 18.5 mmol/L      Calcium 7.3 mg/dL      BUN/Creatinine Ratio 24.7     Anion Gap 13.5 mmol/L      eGFR 29.3 mL/min/1.73     Narrative:      GFR Normal >60  Chronic Kidney Disease <60  Kidney Failure <15    The GFR formula is only valid for adults with stable renal function between ages 18 and 70.    CBC & Differential [[id number]]  (Abnormal) Collected: 11/06/24 0443    Specimen: Blood Updated: 11/06/24 0532    Narrative:      The following orders were created for panel order CBC & Differential.  Procedure                               Abnormality         Status                     ---------                               -----------         ------                     CBC Auto Differential[[id number]]        Abnormal            Final result                 Please view results for these tests on the individual orders.    CBC Auto Differential [[id number]]  (Abnormal)  Collected: 11/06/24 0443    Specimen: Blood Updated: 11/06/24 0531     WBC 19.97 10*3/mm3      RBC 3.42 10*6/mm3      Hemoglobin 9.9 g/dL      Hematocrit 29.8 %      MCV 87.1 fL      MCH 28.9 pg      MCHC 33.2 g/dL      RDW 13.5 %      RDW-SD 43.0 fl      MPV 12.5 fL      Platelets 84 10*3/mm3     POC Glucose Once [[id number]]  (Normal) Collected: 11/06/24 0502    Specimen: Blood Updated: 11/06/24 0502     Glucose 128 mg/dL     Blood Gas, Arterial - [[id number]]  (Abnormal) Collected: 11/06/24 0440    Specimen: Arterial Blood Updated: 11/06/24 0444     Site Arterial Line     William's Test N/A     pH, Arterial 7.305 pH units      pCO2, Arterial 40.8 mm Hg      pO2, Arterial 74.0 mm Hg      HCO3, Arterial 20.3 mmol/L      Base Excess, Arterial -5.7 mmol/L      Comment: Serial Number: [id number]:  [id number]        O2 Saturation, Arterial 93.2 %      A-a DO2 0.4 mmHg      CO2 Content 21.5 mmol/L      Barometric Pressure for Blood Gas 747.2000 mmHg      Modality Adult Vent     FIO2 30 %      Ventilator Mode AC     Set Tidal Volume 400     Set Mech Resp Rate 20     Rate 20 Breaths/minute      PEEP 5     PIP 24 cmH2O      Hemodilution No     Mean Airway Pressure 10     PO2/FIO2 247    POC Glucose Once [[id number]]  (Normal) Collected: 11/06/24 0011    Specimen: Blood Updated: 11/06/24 0012     Glucose 114 mg/dL     POC Glucose Once [[id number]]  (Normal) Collected: 11/05/24 2103    Specimen: Blood Updated: 11/05/24 2105     Glucose 112 mg/dL     Blood Culture - Blood, Arm, Right [[id number]]  (Normal) Collected: 11/03/24 1805    Specimen: Blood from Arm, Right Updated: 11/05/24 1815     Blood Culture No growth at 2 days    Narrative:      Less than seven (7) mL's of blood was collected.  Insufficient quantity may yield false negative results.    POC Glucose Once [[id number]]  (Normal) Collected: 11/05/24 1548    Specimen: Blood Updated: 11/05/24 1549     Glucose 116 mg/dL     POC Glucose Once [[id number]]  (Normal) Collected:  11/05/24 1136    Specimen: Blood Updated: 11/05/24 1137     Glucose 124 mg/dL     POC Glucose Once [[id number]]  (Abnormal) Collected: 11/05/24 0804    Specimen: Blood Updated: 11/05/24 0805     Glucose 134 mg/dL     Blood Gas, Arterial - [[id number]]  (Abnormal) Collected: 11/05/24 0703    Specimen: Arterial Blood Updated: 11/05/24 0706     Site Arterial Line     William's Test N/A     pH, Arterial 7.364 pH units      pCO2, Arterial 35.3 mm Hg      pO2, Arterial 83.9 mm Hg      HCO3, Arterial 20.2 mmol/L      Base Excess, Arterial -4.7 mmol/L      Comment: Serial Number: [id number]:  [id number]        O2 Saturation, Arterial 96.0 %      A-a DO2 0.5 mmHg      CO2 Content 21.2 mmol/L      Barometric Pressure for Blood Gas 750.1000 mmHg      Modality Adult Vent     FIO2 30 %      Ventilator Mode AC     Set Tidal Volume 400     Set Mech Resp Rate 20     Rate 29 Breaths/minute      PEEP 5     Hemodilution No     Device Comment spo2 96% etco2 32     PO2/FIO2 280    POC Glucose Once [[id number]]  (Normal) Collected: 11/05/24 0501    Specimen: Blood Updated: 11/05/24 0502     Glucose 120 mg/dL     Manual Differential [[id number]]  (Abnormal) Collected: 11/05/24 0322    Specimen: Blood Updated: 11/05/24 0447     Neutrophil % 97.8 %      Lymphocyte % 1.1 %      Monocyte % 0.0 %      Eosinophil % 0.0 %      Basophil % 0.0 %      Metamyelocyte % 1.1 %      Neutrophils Absolute 27.18 10*3/mm3      Lymphocytes Absolute 0.31 10*3/mm3      Monocytes Absolute 0.00 10*3/mm3      Eosinophils Absolute 0.00 10*3/mm3      Basophils Absolute 0.00 10*3/mm3      Dacrocytes Slight/1+     Microcytes Slight/1+     Polychromasia Slight/1+     Smudge Cells Slight/1+     Platelet Morphology Normal    Basic Metabolic Panel [[id number]]  (Abnormal) Collected: 11/05/24 0322    Specimen: Blood Updated: 11/05/24 0410     Glucose 118 mg/dL      BUN 39 mg/dL      Creatinine 1.99 mg/dL      Sodium 140 mmol/L      Potassium 3.4 mmol/L      Chloride 108 mmol/L       CO2 18.0 mmol/L      Calcium 7.1 mg/dL      BUN/Creatinine Ratio 19.6     Anion Gap 14.0 mmol/L      eGFR 24.2 mL/min/1.73     Narrative:      GFR Normal >60  Chronic Kidney Disease <60  Kidney Failure <15    The GFR formula is only valid for adults with stable renal function between ages 18 and 70.    Lactic Acid, Plasma [[id number]]  (Normal) Collected: 11/05/24 0322    Specimen: Blood Updated: 11/05/24 0404     Lactate 1.6 mmol/L     CBC & Differential [[id number]]  (Abnormal) Collected: 11/05/24 0322    Specimen: Blood Updated: 11/05/24 0352    Narrative:      The following orders were created for panel order CBC & Differential.  Procedure                               Abnormality         Status                     ---------                               -----------         ------                     CBC Auto Differential[[id number]]        Abnormal            Final result                 Please view results for these tests on the individual orders.    CBC Auto Differential [[id number]]  (Abnormal) Collected: 11/05/24 0322    Specimen: Blood Updated: 11/05/24 0352     WBC 27.79 10*3/mm3      RBC 3.62 10*6/mm3      Hemoglobin 10.7 g/dL      Hematocrit 31.5 %      MCV 87.0 fL      MCH 29.6 pg      MCHC 34.0 g/dL      RDW 13.5 %      RDW-SD 43.7 fl      MPV 12.7 fL      Platelets 103 10*3/mm3      nRBC 0.0 /100 WBC     Blood Gas, Arterial - [[id number]]  (Abnormal) Collected: 11/05/24 0334    Specimen: Arterial Blood Updated: 11/05/24 0337     Site Arterial Line     William's Test N/A     pH, Arterial 7.322 pH units      pCO2, Arterial 38.2 mm Hg      pO2, Arterial 80.1 mm Hg      HCO3, Arterial 19.8 mmol/L      Base Excess, Arterial -5.8 mmol/L      Comment: Serial Number: [id number]:  [id number]        O2 Saturation, Arterial 94.8 %      A-a DO2 0.5 mmHg      CO2 Content 21.0 mmol/L      Barometric Pressure for Blood Gas 748.8000 mmHg      Modality Adult Vent     FIO2 30 %      Ventilator Mode VC     Set Tidal Volume  400     Set [facility] Resp Rate 20     Rate 26 Breaths/minute      PEEP 5     Hemodilution No     Device Comment drawn@0332 Sat 96%     PO2/FIO2 267    POC Glucose Once [[id number]]  (Abnormal) Collected: 11/05/24 0032    Specimen: Blood Updated: 11/05/24 0033     Glucose 133 mg/dL     POC Glucose Once [[id number]]  (Normal) Collected: 11/04/24 2038    Specimen: Blood Updated: 11/04/24 2038     Glucose 106 mg/dL     Lactic Acid, Plasma [[id number]]  (Abnormal) Collected: 11/04/24 1823    Specimen: Blood Updated: 11/04/24 1912     Lactate 2.1 mmol/L     Basic Metabolic Panel [[id number]]  (Abnormal) Collected: 11/04/24 1823    Specimen: Blood Updated: 11/04/24 1908     Glucose 128 mg/dL      BUN 39 mg/dL      Creatinine 2.15 mg/dL      Sodium 144 mmol/L      Potassium 3.7 mmol/L      Chloride 109 mmol/L      CO2 17.5 mmol/L      Calcium 7.6 mg/dL      BUN/Creatinine Ratio 18.1     Anion Gap 17.5 mmol/L      eGFR 22.1 mL/min/1.73     Narrative:      GFR Normal >60  Chronic Kidney Disease <60  Kidney Failure <15    The GFR formula is only valid for adults with stable renal function between ages 18 and 70.    POC Glucose Once [[id number]]  (Abnormal) Collected: 11/04/24 1641    Specimen: Blood Updated: 11/04/24 1643     Glucose 136 mg/dL     High Sensitivity Troponin T [[id number]]  (Abnormal) Collected: 11/04/24 1203    Specimen: Blood Updated: 11/04/24 1507     HS Troponin T 268 ng/L     Narrative:      High Sensitive Troponin T Reference Range:  <14.0 ng/L- Negative Female for AMI  <22.0 ng/L- Negative Male for AMI  >=14 - Abnormal Female indicating possible myocardial injury.  >=22 - Abnormal Male indicating possible myocardial injury.   Clinicians would have to utilize clinical acumen, EKG, Troponin, and serial changes to determine if it is an Acute Myocardial Infarction or myocardial injury due to an underlying chronic condition.         Protein / Creatinine Ratio, Urine - Urine, Clean Catch [[id number]]  (Abnormal)  Collected: 11/04/24 1203    Specimen: Urine, Clean Catch Updated: 11/04/24 1418     Protein/Creatinine Ratio, Urine 1,507.4 mg/G Crea      Creatinine, Urine 80.8 mg/dL      Total Protein, Urine 121.8 mg/dL     Manual Differential [[id number]]  (Abnormal) Collected: 11/04/24 1203    Specimen: Blood Updated: 11/04/24 1315     Neutrophil % 92.9 %      Lymphocyte % 3.0 %      Monocyte % 4.0 %      Eosinophil % 0.0 %      Basophil % 0.0 %      Neutrophils Absolute 32.75 10*3/mm3      Lymphocytes Absolute 1.06 10*3/mm3      Monocytes Absolute 1.41 10*3/mm3      Eosinophils Absolute 0.00 10*3/mm3      Basophils Absolute 0.00 10*3/mm3      RBC Morphology Normal     Smudge Cells Slight/1+     Platelet Morphology Normal    Sodium, Urine, Random - Urine, Clean Catch [[id number]] Collected: 11/04/24 1203    Specimen: Urine, Clean Catch Updated: 11/04/24 1300     Sodium, Urine <20 mmol/L     Narrative:      Reference intervals for random urine have not been established.  Clinical usage is dependent upon physician's interpretation in combination with other laboratory tests.       CBC & Differential [[id number]]  (Abnormal) Collected: 11/04/24 1203    Specimen: Blood Updated: 11/04/24 1253    Narrative:      The following orders were created for panel order CBC & Differential.  Procedure                               Abnormality         Status                     ---------                               -----------         ------                     CBC Auto Differential[[id number]]        Abnormal            Final result                 Please view results for these tests on the individual orders.    CBC Auto Differential [[id number]]  (Abnormal) Collected: 11/04/24 1203    Specimen: Blood Updated: 11/04/24 1253     WBC 35.25 10*3/mm3      RBC 3.91 10*6/mm3      Hemoglobin 11.7 g/dL      Hematocrit 34.2 %      MCV 87.5 fL      MCH 29.9 pg      MCHC 34.2 g/dL      RDW 13.7 %      RDW-SD 43.6 fl      MPV 12.1 fL      Platelets 156  10*3/mm3     Comprehensive Metabolic Panel [[id number]]  (Abnormal) Collected: 11/04/24 1203    Specimen: Blood Updated: 11/04/24 1247     Glucose 136 mg/dL      BUN 37 mg/dL      Creatinine 2.54 mg/dL      Sodium 139 mmol/L      Potassium 3.8 mmol/L      Chloride 109 mmol/L      CO2 15.5 mmol/L      Calcium 7.6 mg/dL      Total Protein 5.5 g/dL      Albumin 2.9 g/dL      ALT (SGPT) 22 U/L      AST (SGOT) 42 U/L      Alkaline Phosphatase 102 U/L      Total Bilirubin 0.6 mg/dL      Globulin 2.6 gm/dL      A/G Ratio 1.1 g/dL      BUN/Creatinine Ratio 14.6     Anion Gap 14.5 mmol/L      eGFR 18.1 mL/min/1.73     Narrative:      GFR Normal >60  Chronic Kidney Disease <60  Kidney Failure <15    The GFR formula is only valid for adults with stable renal function between ages 18 and 70.    Lactic Acid, Plasma [[id number]]  (Abnormal) Collected: 11/04/24 1203    Specimen: Blood Updated: 11/04/24 1247     Lactate 2.2 mmol/L     Blood Gas, Arterial - [[id number]]  (Abnormal) Collected: 11/04/24 1207    Specimen: Arterial Blood Updated: 11/04/24 1211     Site Arterial Line     William's Test N/A     pH, Arterial 7.285 pH units      pCO2, Arterial 36.8 mm Hg      pO2, Arterial 107.3 mm Hg      HCO3, Arterial 17.5 mmol/L      Base Excess, Arterial -8.5 mmol/L      Comment: Serial Number: [id number]:  [id number]        O2 Saturation, Arterial 97.5 %      A-a DO2 0.4 mmHg      CO2 Content 18.6 mmol/L      Barometric Pressure for Blood Gas 752.0000 mmHg      Modality Adult Vent     FIO2 40 %      Ventilator Mode AC     Set Tidal Volume 400     Set Mech Resp Rate 20     Rate 24 Breaths/minute      PEEP 5     Notified Who Peg Franklin     Read Back Yes     Notified Time --     Hemodilution No     Device Comment spo2 98% etco2 32     PO2/FIO2 268    POC Glucose Once [[id number]]  (Normal) Collected: 11/04/24 1139    Specimen: Blood Updated: 11/04/24 1142     Glucose 114 mg/dL     POC Glucose Once [[id number]]  (Normal) Collected: 11/04/24  0806    Specimen: Blood Updated: 11/04/24 0807     Glucose 116 mg/dL     Blood Culture ID, PCR - Blood, Arm, Right [[id number]]  (Abnormal) Collected: 11/03/24 1644    Specimen: Blood from Arm, Right Updated: 11/04/24 0619     BCID, PCR Klebsiella pneumoniae group. Identification by BCID2 PCR.     BOTTLE TYPE Anaerobic Bottle    Narrative:      No resistance genes detected.    Magnesium [[id number]]  (Normal) Collected: 11/04/24 0526    Specimen: Blood Updated: 11/04/24 0605     Magnesium 2.0 mg/dL     Basic Metabolic Panel [[id number]]  (Abnormal) Collected: 11/04/24 0526    Specimen: Blood Updated: 11/04/24 0605     Glucose 126 mg/dL      BUN 36 mg/dL      Creatinine 2.41 mg/dL      Sodium 141 mmol/L      Potassium 2.9 mmol/L      Chloride 109 mmol/L      CO2 15.0 mmol/L      Calcium 7.9 mg/dL      BUN/Creatinine Ratio 14.9     Anion Gap 17.0 mmol/L      eGFR 19.3 mL/min/1.73     Narrative:      GFR Normal >60  Chronic Kidney Disease <60  Kidney Failure <15    The GFR formula is only valid for adults with stable renal function between ages 18 and 70.    Phosphorus [[id number]]  (Normal) Collected: 11/04/24 0526    Specimen: Blood Updated: 11/04/24 0603     Phosphorus 3.2 mg/dL     CBC (No Diff) [[id number]]  (Abnormal) Collected: 11/04/24 0526    Specimen: Blood Updated: 11/04/24 0546     WBC 32.40 10*3/mm3      RBC 3.93 10*6/mm3      Hemoglobin 11.6 g/dL      Hematocrit 34.0 %      MCV 86.5 fL      MCH 29.5 pg      MCHC 34.1 g/dL      RDW 13.2 %      RDW-SD 40.9 fl      MPV 12.2 fL      Platelets 182 10*3/mm3     STAT Lactic Acid, Reflex [[id number]]  (Abnormal) Collected: 11/04/24 0349    Specimen: Blood Updated: 11/04/24 0521     Lactate 2.4 mmol/L     Blood Gas, Arterial -Obtain on: Current Settings [[id number]]  (Abnormal) Collected: 11/04/24 0248    Specimen: Arterial Blood Updated: 11/04/24 0254     Site Arterial Line     William's Test N/A     pH, Arterial 7.251 pH units      pCO2, Arterial 40.3 mm Hg      pO2,  Arterial 91.5 mm Hg      HCO3, Arterial 17.7 mmol/L      Base Excess, Arterial -9.0 mmol/L      Comment: Serial Number: [id number]:  [id number]        O2 Saturation, Arterial 95.6 %      A-a DO2 0.4 mmHg      CO2 Content 19.0 mmol/L      Barometric Pressure for Blood Gas 750.8000 mmHg      Modality Adult Vent     FIO2 40 %      Ventilator Mode VC     Set Tidal Volume 400     Set Mech Resp Rate 20     Rate 22 Breaths/minute      PEEP 5     Notified Who Alena Hinds RN     Read Back Yes     Notified Time --     Hemodilution No     Device Comment drawn@0248 Sat 98%     PO2/FIO2 229    Blood Gas, Arterial - [[id number]]  (Abnormal) Collected: 11/03/24 2248    Specimen: Arterial Blood Updated: 11/03/24 2253     Site Arterial Line     William's Test N/A     pH, Arterial 7.290 pH units      pCO2, Arterial 34.7 mm Hg      pO2, Arterial 298.5 mm Hg      HCO3, Arterial 16.7 mmol/L      Base Excess, Arterial -9.1 mmol/L      Comment: Serial Number: [id number]:  [id number]        O2 Saturation, Arterial 99.9 %      A-a DO2 0.4 mmHg      CO2 Content 17.7 mmol/L      Barometric Pressure for Blood Gas 749.7000 mmHg      Modality Adult Vent     FIO2 100 %      Ventilator Mode AC     Set Tidal Volume 400     Set Mech Resp Rate 14     Rate 36 Breaths/minute      PEEP 5     Notified Who Rosa King RN     Read Back Yes     Notified Time --     Hemodilution No     Device Comment sat 100%     PO2/FIO2 299    High Sensitivity Troponin T 2Hr [[id number]]  (Abnormal) Collected: 11/03/24 1800    Specimen: Blood Updated: 11/03/24 1913     HS Troponin T 230 ng/L      Troponin T Delta -44 ng/L     Narrative:      High Sensitive Troponin T Reference Range:  <14.0 ng/L- Negative Female for AMI  <22.0 ng/L- Negative Male for AMI  >=14 - Abnormal Female indicating possible myocardial injury.  >=22 - Abnormal Male indicating possible myocardial injury.   Clinicians would have to utilize clinical acumen, EKG, Troponin, and serial changes to determine  "if it is an Acute Myocardial Infarction or myocardial injury due to an underlying chronic condition.         STAT Lactic Acid, Reflex [[id number]]  (Abnormal) Collected: 11/03/24 1800    Specimen: Blood Updated: 11/03/24 1853     Lactate 4.3 mmol/L     Urinalysis, Microscopic Only - Indwelling Urethral Catheter [[id number]]  (Abnormal) Collected: 11/03/24 1745    Specimen: Urine from Indwelling Urethral Catheter Updated: 11/03/24 1816     RBC, UA 0-2 /HPF      WBC, UA 3-5 /HPF      Bacteria, UA None Seen /HPF      Squamous Epithelial Cells, UA 3-6 /HPF      Hyaline Casts, UA Too Numerous to Count /LPF      Methodology Automated Microscopy    Urinalysis With Microscopic If Indicated (No Culture) - Indwelling Urethral Catheter [[id number]]  (Abnormal) Collected: 11/03/24 1745    Specimen: Urine from Indwelling Urethral Catheter Updated: 11/03/24 1808     Color, UA Yellow     Appearance, UA Cloudy     pH, UA <=5.0     Specific Gravity, UA 1.014     Glucose, UA Negative     Ketones, UA Trace     Bilirubin, UA Negative     Blood, UA Negative     Protein, UA 30 mg/dL (1+)     Leuk Esterase, UA Trace     Nitrite, UA Negative     Urobilinogen, UA 0.2 E.U./dL    Procalcitonin [[id number]]  (Abnormal) Collected: 11/03/24 1546    Specimen: Blood Updated: 11/03/24 1749     Procalcitonin 93.60 ng/mL     Narrative:      As a Marker for Sepsis (Non-Neonates):    1. <0.5 ng/mL represents a low risk of severe sepsis and/or septic shock.  2. >2 ng/mL represents a high risk of severe sepsis and/or septic shock.    As a Marker for Lower Respiratory Tract Infections that require antibiotic therapy:    PCT on Admission    Antibiotic Therapy       6-12 Hrs later    >0.5                Strongly Recommended  >0.25 - <0.5        Recommended   0.1 - 0.25          Discouraged              Remeasure/reassess PCT  <0.1                Strongly Discouraged     Remeasure/reassess PCT    As 28 day mortality risk marker: \"Change in Procalcitonin Result\" " (>80% or <=80%) if Day 0 (or Day 1) and Day 4 values are available. Refer to [email or web address]-pct-[email or web address]    Change in PCT <=80%  A decrease of PCT levels below or equal to 80% defines a positive change in PCT test result representing a higher risk for 28-day all-cause mortality of patients diagnosed with severe sepsis for septic shock.    Change in PCT >80%  A decrease of PCT levels of more than 80% defines a negative change in PCT result representing a lower risk for 28-day all-cause mortality of patients diagnosed with severe sepsis or septic shock.       Manual Differential [[id number]]  (Abnormal) Collected: 11/03/24 1546    Specimen: Blood Updated: 11/03/24 1654     Neutrophil % 92.0 %      Lymphocyte % 1.0 %      Monocyte % 5.0 %      Eosinophil % 0.0 %      Basophil % 0.0 %      Metamyelocyte % 2.0 %      Neutrophils Absolute 15.85 10*3/mm3      Lymphocytes Absolute 0.17 10*3/mm3      Monocytes Absolute 0.86 10*3/mm3      Eosinophils Absolute 0.00 10*3/mm3      Basophils Absolute 0.00 10*3/mm3      RBC Morphology Normal     WBC Morphology Normal     Platelet Morphology Normal    High Sensitivity Troponin T [[id number]]  (Abnormal) Collected: 11/03/24 1546    Specimen: Blood Updated: 11/03/24 1639     HS Troponin T 274 ng/L     Narrative:      High Sensitive Troponin T Reference Range:  <14.0 ng/L- Negative Female for AMI  <22.0 ng/L- Negative Male for AMI  >=14 - Abnormal Female indicating possible myocardial injury.  >=22 - Abnormal Male indicating possible myocardial injury.   Clinicians would have to utilize clinical acumen, EKG, Troponin, and serial changes to determine if it is an Acute Myocardial Infarction or myocardial injury due to an underlying chronic condition.         BNP [[id number]]  (Abnormal) Collected: 11/03/24 1546    Specimen: Blood Updated: 11/03/24 1634     proBNP 5,785.0 pg/mL     Narrative:      This assay is used as an aid in the diagnosis of individuals suspected of having  heart failure. It can be used as an aid in the diagnosis of acute decompensated heart failure (ADHF) in patients presenting with signs and symptoms of ADHF to the emergency department (ED). In addition, NT-proBNP of <300 pg/mL indicates ADHF is not likely.    Age Range Result Interpretation  NT-proBNP Concentration (pg/mL:      <50             Positive            >450                   Gray                 300-450                    Negative             <300    50-75           Positive            >900                  Gray                300-900                  Negative            <300      >75             Positive            >1800                  Gray                300-1800                  Negative            <300    Protime-INR [[id number]]  (Abnormal) Collected: 11/03/24 1546    Specimen: Blood Updated: 11/03/24 1633     Protime 15.8 Seconds      INR 1.24    Comprehensive Metabolic Panel [[id number]]  (Abnormal) Collected: 11/03/24 1546    Specimen: Blood Updated: 11/03/24 1628     Glucose 97 mg/dL      BUN 30 mg/dL      Creatinine 2.17 mg/dL      Sodium 140 mmol/L      Potassium 3.6 mmol/L      Comment: Slight hemolysis detected by analyzer. Result may be falsely elevated.        Chloride 107 mmol/L      CO2 19.9 mmol/L      Calcium 8.4 mg/dL      Total Protein 4.9 g/dL      Albumin 3.1 g/dL      ALT (SGPT) 18 U/L      AST (SGOT) 33 U/L      Comment: Slight hemolysis detected by analyzer. Result may be falsely elevated.        Alkaline Phosphatase 53 U/L      Total Bilirubin 0.4 mg/dL      Globulin 1.8 gm/dL      A/G Ratio 1.7 g/dL      BUN/Creatinine Ratio 13.8     Anion Gap 13.1 mmol/L      eGFR 21.8 mL/min/1.73     Narrative:      GFR Normal >60  Chronic Kidney Disease <60  Kidney Failure <15    The GFR formula is only valid for adults with stable renal function between ages 18 and 70.    Magnesium [[id number]]  (Abnormal) Collected: 11/03/24 1546    Specimen: Blood Updated: 11/03/24 1628     Magnesium 1.5  mg/dL     Lactic Acid, Plasma [[id number]]  (Abnormal) Collected: 11/03/24 1546    Specimen: Blood Updated: 11/03/24 1626     Lactate 4.9 mmol/L     CBC & Differential [[id number]]  (Abnormal) Collected: 11/03/24 1546    Specimen: Blood Updated: 11/03/24 1604    Narrative:      The following orders were created for panel order CBC & Differential.  Procedure                               Abnormality         Status                     ---------                               -----------         ------                     CBC Auto Differential[[id number]]        Abnormal            Final result                 Please view results for these tests on the individual orders.    CBC Auto Differential [[id number]]  (Abnormal) Collected: 11/03/24 1546    Specimen: Blood Updated: 11/03/24 1604     WBC 17.23 10*3/mm3      RBC 3.64 10*6/mm3      Hemoglobin 10.9 g/dL      Hematocrit 32.3 %      MCV 88.7 fL      MCH 29.9 pg      MCHC 33.7 g/dL      RDW 13.3 %      RDW-SD 43.5 fl      MPV 11.9 fL      Platelets 158 10*3/mm3      nRBC 0.0 /100 WBC     Blood Gas, Arterial - [[id number]]  (Abnormal) Collected: 11/03/24 1558    Specimen: Arterial Blood Updated: 11/03/24 1603     Site Right Radial     William's Test Positive     pH, Arterial 7.396 pH units      pCO2, Arterial 29.6 mm Hg      pO2, Arterial 88.1 mm Hg      HCO3, Arterial 18.2 mmol/L      Base Excess, Arterial -5.9 mmol/L      Comment: Serial Number: [id number]:  [id number]        O2 Saturation, Arterial 96.9 %      CO2 Content 19.1 mmol/L      Barometric Pressure for Blood Gas 750.7000 mmHg      Modality Cannula     Flow Rate 3.0000 lpm      Rate 22 Breaths/minute      Hemodilution No     Device Comment sat 99%    POC Occult Blood Stool [[id number]]  (Abnormal) Collected: 11/03/24 1509    Specimen: Stool Updated: 11/03/24 1510     Fecal Occult Blood Positive     Lot Number [id number]     Expiration Date Right 28 2025     Positive Control Positive     Comment: Positive        Negative  Control Negative            Imaging Results (All)       Procedure Component Value Units Date/Time    XR Chest 1 View [[id number]] Collected: 11/06/24 0701     Updated: 11/06/24 0711    Narrative:      XR CHEST 1 VW-11/6/2024     HISTORY: Intubated patient.     Heart size is mildly enlarged. Lungs are underinflated. There is some  minimal vascular crowding. There also is some mild haziness of the lung  bases which may be related to some mild atelectasis, infiltrate or  atypical pulmonary edema.     Endotracheal tube, NG tube and central venous catheter are seen in good  position.     No pneumothorax is seen.       Impression:      1. Mild cardiomegaly.  2. Underinflation of the lungs with mild increased density in the lung  bases as described.  3. Satisfactory position of life support devices.  4. No significant pneumothorax is seen.     This report was finalized on 11/6/2024 7:08 AM by Dr. Vishal Pereyra M.D on Workstation: [id number]       XR Chest 1 View [[id number]] Collected: 11/05/24 0704     Updated: 11/05/24 0708    Narrative:      XR CHEST 1 VW-11/5/2024     HISTORY: Intubated patient.     Heart size is mildly enlarged. Lungs are underinflated. There may be  some minimal atelectasis or infiltrate in the right infrahilar region.  No pneumothorax is seen.     ET tube, NG tube and central venous catheter are again seen. No  pneumothorax is seen.     Chest appears largely unchanged from yesterday's study.        This report was finalized on 11/5/2024 7:05 AM by Dr. Vishal Pereyra M.D on Workstation: [id number]       CT Abdomen Pelvis Without Contrast [[id number]] Collected: 11/03/24 1710     Updated: 11/04/24 1210    Narrative:      CT ABDOMEN AND PELVIS WITHOUT IV CONTRAST     HISTORY: 84-year-old female with leukocytosis and hypotension. Vomiting  and diarrhea.     TECHNIQUE: Radiation dose reduction techniques were utilized, including  automated exposure control and exposure modulation based on  body size.   3 mm images were obtained through the abdomen and pelvis without the  administration of IV contrast. No priors for comparison.     FINDINGS:  1. There is a 5 mm stone within the distal right ureter just proximal to  the UVJ with mild-moderate right hydroureteronephrosis. There is  moderate bilateral perinephric stranding. There are no left renal or  ureteral stones and there is no hydronephrosis on the left. The  perinephric stranding may be secondary to acute UTI pyelonephritis.  Urinary bladder is collapsed. Please correlate clinically.  Many phleboliths are noted. There are 2 right renal cysts with the  largest measuring 5 cm.     2. Noncontrasted liver, gallbladder, spleen, pancreas, and right adrenal  appear unremarkable. There is nodular hyperplasia of the left adrenal  gland.     3. There is no acute bowel abnormality. There is no evidence for colitis  or appendicitis. Noncontrasted uterus and adnexa appear unremarkable.  There are moderate abdominal aortic atherosclerotic changes without  aneurysmal dilatation.     4. There is pulmonary vascular congestion and dependent atelectatic  changes at the lung bases.     This report was finalized on 11/4/2024 12:06 PM by Dr. Dana Jose M.D  on Workstation: [id number]       XR Chest 1 View [[id number]] Collected: 11/04/24 0831     Updated: 11/04/24 0840    Narrative:      XR CHEST 1 VW-        INDICATION: Intubated     COMPARISON: Chest radiograph November 3, 2024     TECHNIQUE: 1 view chest     FINDINGS:      Increased lung markings. Vascular congestion. Heterogeneous basilar  opacities. Stable mediastinum. Endotracheal tube tip is in the trachea  located 4.5 cm above the anaya. Subtle lucency along the right  hemidiaphragm.       Impression:         1. Interval removal of NG tube and left IJ catheter.  2. Subtle lucency along the right hemidiaphragm, indeterminate.  Questionable for free intraperitoneal air. Clinical correlation with  left side  down decubitus abdominal radiograph as clinically indicated  3. Cardiomegaly with vascular congestion.  4. Possible airways disease.  5. Heterogeneous basilar opacities     This report was finalized on 11/4/2024 8:37 AM by Dr. Emmanuel Miranda M.D on Workstation: [id number]       XR Chest 1 View [[id number]] Collected: 11/04/24 0759     Updated: 11/04/24 0806    Narrative:      XR CHEST 1 VW-        INDICATION: Central line placement.     COMPARISON: Chest radiograph November 4, 2024     TECHNIQUE: 1 view chest     FINDINGS:      Vascular congestion. Ill-defined left lung base opacity. No effusions.  Stable mediastinum. Endotracheal tube tip is located 5.7 cm above the  anaya in the mid trachea. NG tube seen. Cholecystectomy clips. Left IJ  catheter with the tip appears side walled against the superior vena  cava.       Impression:         1. Left IJ catheter tip appears sidewalled against the SVC. No  pneumothorax.  2. Interval placement of NG tube.  3. Ill-defined left lung base opacity, stable     This report was finalized on 11/4/2024 8:03 AM by Dr. Emmanuel Miranda M.D on Workstation: [id number]       XR Chest 1 View [[id number]] Collected: 11/03/24 2300     Updated: 11/03/24 2300    Narrative:        Patient: MARILU COATES  Time Out: 22:59  Exam(s): XR CXR 1 VIEW     EXAM:    XR Chest, 1 View    CLINICAL HISTORY:     Reason for exam: post op intubation.    TECHNIQUE:    Frontal view of the chest.    COMPARISON:    No relevant prior studies available.    FINDINGS:    Lungs:  Mild pulmonary vascular congestion.  No consolidation.    Pleural space:  Unremarkable.  No pneumothorax.    Heart:  Cardiomegaly.    Mediastinum:  Unremarkable.  Normal mediastinal contour.    Bones joints:  Unremarkable.  No acute fracture.    Tubes, lines and devices:  ET tube is noted with the tip approximately   3.5 cm from the anaya.    IMPRESSION:         Mild pulmonary vascular congestion.      Impression:           Electronically signed by Bulmaro Abraham MD on 11-03-24 at 2259    FL C Arm During Surgery [[id number]] Resulted: 11/03/24 2220     Updated: 11/03/24 2220    Narrative:      This procedure was auto-finalized with no dictation required.    XR Chest 1 View [[id number]] Collected: 11/03/24 1532     Updated: 11/03/24 1536    Narrative:      XR CHEST 1 VW-     HISTORY: Female who is 84 years-old, hypoxia     TECHNIQUE: Frontal view of the chest     COMPARISON: None available     FINDINGS: The heart is enlarged. Aorta is tortuous, calcified. Pulmonary  vasculature is unremarkable. No focal pulmonary consolidation, pleural  effusion, or pneumothorax. No acute osseous process.       Impression:      No focal pulmonary consolidation. Cardiomegaly. Follow-up as  clinical indications persist.     This report was finalized on 11/3/2024 3:33 PM by Dr. Jose Hardy M.D on Workstation: [id number]                   Assessment & Plan         Acute pyelonephritis  Urosepsis      Plan   - now off pressors and clinically improved.  - continue iv Rocephin. Once patient is tolerating PO medications, safe to transition to Levaquin to complete 2 week course  - we will arrange follow up in 2 weeks to discuss definitive stone therapy    Anish Harris Jr., MD  11/06/24  08:18 EST

## 2024-11-06 NOTE — PROGRESS NOTES
"  Daily Progress Note.   [facility] CORONARY CARE  11/6/2024    Patient:  Name:  Kristyn Lugo  MRN:  [id number]  1940  84 y.o.  female         CC: septic shock resp failure on mech vent with pyelonephritis   Summary:  Per Dr Catherine:  \"84-year-old female who presented with sepsis and found to be in septic shock secondary to acute pyelonephritis and underwent cystoscopy with stent placement for ureteral calculi. Came back to the ICU intubated requiring vasopressor support. Continue antibiotics, Klebsiella noted on blood cultures by PCR. \"    Interval History:  Maintains on mechanical ventilation  Sedated limiting history  Afebrile  Off vasopressors    Physical Exam:  /61 (BP Location: Right arm, Patient Position: Lying)   Pulse 70   Temp 98.9 °F (37.2 °C) (Oral)   Resp 20   Ht 154 cm (60.63\")   Wt 78 kg (171 lb 15.3 oz)   SpO2 97%   BMI 32.89 kg/m²   Body mass index is 32.89 kg/m².    Intake/Output Summary (Last 24 hours) at 11/6/2024 0720  Last data filed at 11/6/2024 0700  Gross per 24 hour   Intake 10909.83 ml   Output 1193 ml   Net 9263.83 ml   Vent Settings          Resp Rate (Set): 20     FiO2 (%): 30 %  PEEP/CPAP (cm H2O): 5 cm H20    Minute Ventilation (L/min) (Obs): 7.96 L/min  Resp Rate (Observed) Vent: 20     I:E Ratio (Obs): 1:3.2    PIP Observed (cm H2O): 24 cm H2O  Plateau Pressure (cm H2O): 0 cm H2O  Driving Pressure (cm H2O): -5.1 cm H2O    General appearance: ill on mech vent  Eyes: anicteric sclerae,  ;    HENT: Atraumatic; oropharynx +ET tube LIJ CVC  Lungs: javier mech synchronous,   CV: RRR, no rub   Abdomen: obese soft non rigid  Extremities: mild  peripheral edema   Skin: WWP no diffuse visible rash  Psych/neuro sedated on mech vent    Data Review:  Notable Labs:  Results from last 7 days   Lab Units 11/06/24  0443 11/05/24  0322 11/04/24  1203 11/04/24  0526 11/03/24  1546   WBC 10*3/mm3 19.97* 27.79* 35.25* 32.40* 17.23*   HEMOGLOBIN g/dL 9.9* 10.7* " 11.7* 11.6* 10.9*   PLATELETS 10*3/mm3 84* 103* 156 182 158     Results from last 7 days   Lab Units 11/06/24 0443 11/05/24 0322 11/04/24  1823 11/04/24  1203 11/04/24  0526 11/03/24  1546   SODIUM mmol/L 143 140 144 139 141 140   POTASSIUM mmol/L 3.5 3.4* 3.7 3.8 2.9* 3.6   CHLORIDE mmol/L 111* 108* 109* 109* 109* 107   CO2 mmol/L 18.5* 18.0* 17.5* 15.5* 15.0* 19.9*   BUN mg/dL 42* 39* 39* 37* 36* 30*   CREATININE mg/dL 1.70* 1.99* 2.15* 2.54* 2.41* 2.17*   GLUCOSE mg/dL 124* 118* 128* 136* 126* 97   CALCIUM mg/dL 7.3* 7.1* 7.6* 7.6* 7.9* 8.4*   MAGNESIUM mg/dL  --   --   --   --  2.0 1.5*   PHOSPHORUS mg/dL  --   --   --   --  3.2  --    Estimated Creatinine Clearance: 22.6 mL/min (A) (by C-G formula based on SCr of 1.7 mg/dL (H)).    Results from last 7 days   Lab Units 11/06/24 0443 11/05/24 0322 11/04/24 1823 11/04/24 1203 11/03/24  1800 11/03/24  1546   AST (SGOT) U/L  --   --   --  42*  --  33*   ALT (SGPT) U/L  --   --   --  22  --  18   PROCALCITONIN ng/mL  --   --   --   --   --  93.60*   LACTATE mmol/L  --  1.6 2.1* 2.2*   < > 4.9*   PLATELETS 10*3/mm3 84* 103*  --  156   < > 158    < > = values in this interval not displayed.       Results from last 7 days   Lab Units 11/06/24 0440 11/05/24  0703 11/05/24  0334 11/04/24  1207 11/04/24  0248 11/03/24  2248 11/03/24  1558   PH, ARTERIAL pH units 7.305* 7.364 7.322* 7.285* 7.251* 7.290* 7.396   PCO2, ARTERIAL mm Hg 40.8 35.3 38.2 36.8 40.3 34.7* 29.6*   PO2 ART mm Hg 74.0* 83.9 80.1 107.3* 91.5 298.5* 88.1   HCO3 ART mmol/L 20.3* 20.2* 19.8* 17.5* 17.7* 16.7* 18.2*       Imaging:  Reviewed chest images personally from past 3 days    ASSESSMENT  /  PLAN:  Right hydronephrosis with nephrolithiasis  Right distal ureteral calculi s/p cysto with stent insertion 11/3/24  EMMA  Sepsis vs hypovolemic shock  Hypotension  Elevated troponin  Elevated proBNP  Acute hypoxemic respiratory failure  Valvular heart disease  Chronic diastolic heart  failure  Anemia  Chronic constipation vs diarrhea  Hx of HTN  Aneurysm of ascending aorta  HLD  Thrombocytopenia consumptive secondary to sepsis continue to monitor observe for any signs of active bleeding    Continue vasopressors wean as able  Continue ceftriaxone  Follow blood cultures  Continuous IV fluids  Bicarb replacement as guided by nephrology  Renal function improving appreciate nephrology  Hemodynamic improving    Mechanical ventilation reviewed   chest x-ray reviewed-vascular congestion  Adjustments as appropriate-SBT today   Dw RN asking for SBT    Cards note reviewed appreciate consultation     DW family at bedside yesterday, no family yet today.       Total critical care time was 38 minutes, excluding any separately billable procedure time.  Time did not overlap with any other provider.    Electronically signed by Neymar Gerber MD, 11/06/24, 8:35 AM EST.

## 2024-11-06 NOTE — PLAN OF CARE
Goal Outcome Evaluation:   Patient rested well overnight on precedex and PRN fentanyl. Levo turned off at 0200. MAP maintained > 65. Urine output > 30ml/hr.

## 2024-11-06 NOTE — PROGRESS NOTES
PROGRESS NOTE      Patient Name: Kristyn Lugo  : 1940  MRN: [id number]  Primary Care Physician: Sean Fisher MD  Date of admission: 11/3/2024    Patient Care Team:  Sean Fisher MD as PCP - General (Internal Medicine)        Subjective   Subjective:     Patient still intubated, 21% fio2, now off pressors    Review of systems:  All ROS negative      Allergies:    Allergies   Allergen Reactions    Fenofibrate Unknown - High Severity     Pt is unaware    Penicillins Unknown - High Severity       Objective   Exam:     Vital Signs  Temp:  [96.8 °F (36 °C)-99.1 °F (37.3 °C)] 98.9 °F (37.2 °C)  Heart Rate:  [65-96] 65  Resp:  [20-26] 21  BP: (101-151)/(61-77) 101/61  FiO2 (%):  [21 %-30 %] 21 %  SpO2:  [91 %-98 %] 91 %  on   ;   Device (Oxygen Therapy): ventilator  Body mass index is 32.89 kg/m².    General:  elderly  female intubated sedated.    Head:      Normocephalic and atraumatic.    Eyes:      PERRL/EOM intact, conjunctivae and sclerae clear without nystagmus.    Neck:      No masses, thyromegaly,  trachea central with normal respiratory effort   Lungs:    Clear bilaterally to auscultation.    Heart:      Regular rate and rhythm, no murmur no gallop  Abd:        Soft, nontender, not distended, bowel sounds positive, no shifting dullness   Pulses:   Pulses palpable  Extr:        No cyanosis or clubbing--mild edema.    Neuro:    sedated  Skin:       Intact without lesions or rashes.    Psych:    Alert and cooperative; normal mood and affect; .      Results Review:  I have personally reviewed most recent Data :  CBC    Results from last 7 days   Lab Units 24  0443 24  0322 24  1203 24  0526 24  1546   WBC 10*3/mm3 19.97* 27.79* 35.25* 32.40* 17.23*   HEMOGLOBIN g/dL 9.9* 10.7* 11.7* 11.6* 10.9*   PLATELETS 10*3/mm3 84* 103* 156 182 158     CMP   Results from last 7 days   Lab Units 24  0443 24  0322 24  1823 24  1203 24  0526  11/03/24  1546   SODIUM mmol/L 143 140 144 139 141 140   POTASSIUM mmol/L 3.5 3.4* 3.7 3.8 2.9* 3.6   CHLORIDE mmol/L 111* 108* 109* 109* 109* 107   CO2 mmol/L 18.5* 18.0* 17.5* 15.5* 15.0* 19.9*   BUN mg/dL 42* 39* 39* 37* 36* 30*   CREATININE mg/dL 1.70* 1.99* 2.15* 2.54* 2.41* 2.17*   GLUCOSE mg/dL 124* 118* 128* 136* 126* 97   ALBUMIN g/dL  --   --   --  2.9*  --  3.1*   BILIRUBIN mg/dL  --   --   --  0.6  --  0.4   ALK PHOS U/L  --   --   --  102  --  53   AST (SGOT) U/L  --   --   --  42*  --  33*   ALT (SGPT) U/L  --   --   --  22  --  18     ABG    Results from last 7 days   Lab Units 11/06/24  0440 11/05/24  0703 11/05/24  0334 11/04/24  1207 11/04/24  0248 11/03/24  2248 11/03/24  1558   PH, ARTERIAL pH units 7.305* 7.364 7.322* 7.285* 7.251* 7.290* 7.396   PCO2, ARTERIAL mm Hg 40.8 35.3 38.2 36.8 40.3 34.7* 29.6*   PO2 ART mm Hg 74.0* 83.9 80.1 107.3* 91.5 298.5* 88.1   O2 SATURATION ART % 93.2 96.0 94.8 97.5 95.6 99.9* 96.9   BASE EXCESS ART mmol/L -5.7* -4.7* -5.8* -8.5* -9.0* -9.1* -5.9*     XR Chest 1 View    Result Date: 11/6/2024  1. Mild cardiomegaly. 2. Underinflation of the lungs with mild increased density in the lung bases as described. 3. Satisfactory position of life support devices. 4. No significant pneumothorax is seen.  This report was finalized on 11/6/2024 7:08 AM by Dr. Vishal Pereyra M.D on Workstation: [id number]       Results for orders placed during the hospital encounter of 11/03/24    Adult Transthoracic Echo Complete W/ Cont if Necessary Per Protocol    Interpretation Summary    Left ventricular systolic function is normal. Calculated left ventricular EF = 52.4%    Left ventricular wall thickness is consistent with mild septal asymmetric hypertrophy.    Left ventricular diastolic function is consistent with age.    Mild aortic valve stenosis is present.    Peak velocity of the flow distal to the aortic valve is 251 cm/s. Aortic valve maximum pressure gradient is 25 mmHg.  Aortic valve mean pressure gradient is 14 mmHg. Aortic valve dimensionless index is 0.6 .    Mild mitral valve stenosis is present. The mitral valve mean gradient is 5 mmHg.    Estimated right ventricular systolic pressure from tricuspid regurgitation is normal (<35 mmHg).    Scheduled Meds:aspirin, 81 mg, Nasogastric, Daily  cefTRIAXone, 2,000 mg, Intravenous, Q24H  chlorhexidine, 15 mL, Mouth/Throat, Q12H  escitalopram, 10 mg, Nasogastric, Daily  folic acid, 400 mcg, Nasogastric, Daily  mupirocin, 1 Application, Each Nare, BID  pantoprazole, 40 mg, Intravenous, Q AM  sodium chloride, 10 mL, Intravenous, Q12H  sodium chloride, 10 mL, Intravenous, Q12H  sodium chloride, 10 mL, Intravenous, Q12H  sodium chloride, 10 mL, Intravenous, Q12H  sodium chloride, 10 mL, Intravenous, Q12H      Continuous Infusions:dexmedetomidine, 0.2-1.5 mcg/kg/hr, Last Rate: Stopped (24 0910)  norepinephrine, 0.02-0.3 mcg/kg/min, Last Rate: Stopped (24 0400)  phenylephrine, 0.5-3 mcg/kg/min, Last Rate: Stopped (24 1720)  sodium chloride 0.45 % 925 mL with sodium bicarbonate 8.4 % 75 mEq infusion, , Last Rate: 50 mL/hr at 24 0428      PRN Meds:  acetaminophen **OR** acetaminophen    fentaNYL citrate (PF)    nitroglycerin    sodium chloride    sodium chloride    sodium chloride    sodium chloride    sodium chloride    Assessment & Plan   Assessment and Plan:         Acute pyelonephritis    ASSESSMENT:  Acute kidney injury  Chronic kidney disease, stage 3, with baseline creatinine roughly 1.0mg/dL; felt to be secondary to hypertension and advanced age  Metabolic acidosis  Hypokalemia  Right hydronephrosis with nephrolithiasis  Right distal ureteral calculi, s/p cysto with stent insertion 11/3/24  Sepsis  Acute hypoxemic respiratory failure  Chronic diastolic heart failure    PLAN :     Renal function slightly better than yesterday, UOP improvin.1L in last 24 hr   Now off pressors  EMMA most likely multifactorial and  secondary to hemodynamic insults, sepsis and right hydronephrosis, most likely has ATN  Making urine. Urine studies c/w prerenal picture   Proteinuria 1.5 gm f/u with SPEP will order  K on low side, will give Kcl IV x 2 doses, will check mag and phos in AM  Metabolic acidosis, with lactic acidosis, may be due to NS administration  Discontinue IV fluid at this time  We will start loop diuretics by tomorrow morning  Continue to follow-up with the volume status closely  More than 900 cc of urine output since this morning  Keep MAP>65  Antibiotics as per primary team. Currently on Rocephin  Will request strict I/Os, daily labs  Discussed in detail with RN  No urgent need for RRT but will follow closely,     Note transcribed for Dr Laura    Electronically signed by JENNIFER Quiroz,   [facility] kidney consultant  [phone]  11/6/2024  11:23 EST

## 2024-11-06 NOTE — CASE MANAGEMENT/SOCIAL WORK
Discharge Planning Assessment  [facility]     Patient Name: Kristyn Lugo  MRN: [id number]  Today's Date: 11/6/2024    Admit Date: 11/3/2024    Plan: Currently on vent. Pending clinical progress   Discharge Needs Assessment       Row Name 11/06/24 1352       Living Environment    People in Home alone    Current Living Arrangements home    Potentially Unsafe Housing Conditions none    In the past 12 months has the electric, gas, oil, or water company threatened to shut off services in your home? No    Primary Care Provided by self    Provides Primary Care For no one    Family Caregiver if Needed none    Quality of Family Relationships supportive    Able to Return to Prior Arrangements yes       Resource/Environmental Concerns    Resource/Environmental Concerns none    Transportation Concerns none       Transportation Needs    In the past 12 months, has lack of transportation kept you from medical appointments or from getting medications? no    In the past 12 months, has lack of transportation kept you from meetings, work, or from getting things needed for daily living? No       Food Insecurity    Within the past 12 months, you worried that your food would run out before you got the money to buy more. Never true    Within the past 12 months, the food you bought just didn't last and you didn't have money to get more. Never true       Transition Planning    Patient/Family Anticipates Transition to home    Patient/Family Anticipated Services at Transition none    Transportation Anticipated family or friend will provide;health plan transportation       Discharge Needs Assessment    Readmission Within the Last 30 Days no previous admission in last 30 days    Equipment Currently Used at Home cane, straight;walker, rolling    Concerns to be Addressed discharge planning    Anticipated Changes Related to Illness none    Equipment Needed After Discharge other (see comments)  TBD    Discharge Facility/Level of Care  Needs other (see comments)  TBD    Provided Post Acute Provider List? Yes    Post Acute Provider List Nursing Home;DME Supplier;Home Health;Inpatient Rehab    Provided Post Acute Provider Quality & Resource List? Yes    Post Acute Provider Quality and Resource List Home Health;Inpatient Rehab;Nursing Home    Delivered To Support Person    Support Person daughter    Method of Delivery In person    Current Discharge Risk lives alone                   Discharge Plan       Row Name 11/06/24 8959       Plan    Plan Currently on vent. Pending clinical progress    Roadmap to Recovery Yes    Patient/Family in Agreement with Plan yes    Plan Comments Spoke with daughter at bedside. Facesheet, PCP and phamracy verfied. Patient lives alone, drives, has no home health nad has not been to rehab or snf in past. patient is independent in community and home with a walker and cane.. Patient drives and is IADL. Currently patient is on vent, IVF, IV lasix                  Continued Care and Services - Admitted Since 11/3/2024    No active coordination exists for this encounter.       Expected Discharge Date and Time       Expected Discharge Date Expected Discharge Time    Nov 8, 2024            Demographic Summary       Row Name 11/06/24 1351       General Information    Admission Type inpatient    Arrived From emergency department    Required Notices Provided Important Message from Medicare    Referral Source admission list    Reason for Consult discharge planning    Preferred Language English                   Functional Status       Row Name 11/06/24 1351       Functional Status    Usual Activity Tolerance good    Current Activity Tolerance poor       Functional Status, IADL    Medications independent    Meal Preparation independent    Housekeeping independent    Laundry independent       Mental Status    General Appearance WDL WDL       Mental Status Summary    Recent Changes in Mental Status/Cognitive Functioning unable to assess        Employment/    Employment Status retired                               Nohemi Romo RN

## 2024-11-07 ENCOUNTER — APPOINTMENT (OUTPATIENT)
Dept: GENERAL RADIOLOGY | Facility: HOSPITAL | Age: 84
DRG: 853 | End: 2024-11-07
Payer: MEDICARE

## 2024-11-07 LAB
ANION GAP SERPL CALCULATED.3IONS-SCNC: 10.7 MMOL/L (ref 5–15)
ARTERIAL PATENCY WRIST A: ABNORMAL
ARTERIAL PATENCY WRIST A: ABNORMAL
ATMOSPHERIC PRESS: 754.1 MMHG
ATMOSPHERIC PRESS: 754.7 MMHG
BASE EXCESS BLDA CALC-SCNC: -0.9 MMOL/L (ref 0–2)
BASE EXCESS BLDA CALC-SCNC: 1.9 MMOL/L (ref 0–2)
BASOPHILS # BLD AUTO: 0.04 10*3/MM3 (ref 0–0.2)
BASOPHILS NFR BLD AUTO: 0.3 % (ref 0–1.5)
BDY SITE: ABNORMAL
BDY SITE: ABNORMAL
BUN SERPL-MCNC: 50 MG/DL (ref 8–23)
BUN/CREAT SERPL: 33.3 (ref 7–25)
CALCIUM SPEC-SCNC: 8.3 MG/DL (ref 8.6–10.5)
CHLORIDE SERPL-SCNC: 108 MMOL/L (ref 98–107)
CO2 BLDA-SCNC: 25 MMOL/L (ref 23–27)
CO2 BLDA-SCNC: 28.2 MMOL/L (ref 23–27)
CO2 SERPL-SCNC: 23.3 MMOL/L (ref 22–29)
CREAT SERPL-MCNC: 1.5 MG/DL (ref 0.57–1)
DEPRECATED RDW RBC AUTO: 43.6 FL (ref 37–54)
DEVICE COMMENT: ABNORMAL
DEVICE COMMENT: ABNORMAL
EGFRCR SERPLBLD CKD-EPI 2021: 34 ML/MIN/1.73
EOSINOPHIL # BLD AUTO: 0.09 10*3/MM3 (ref 0–0.4)
EOSINOPHIL NFR BLD AUTO: 0.8 % (ref 0.3–6.2)
ERYTHROCYTE [DISTWIDTH] IN BLOOD BY AUTOMATED COUNT: 13.7 % (ref 12.3–15.4)
GLUCOSE BLDC GLUCOMTR-MCNC: 140 MG/DL (ref 70–130)
GLUCOSE BLDC GLUCOMTR-MCNC: 146 MG/DL (ref 70–130)
GLUCOSE BLDC GLUCOMTR-MCNC: 148 MG/DL (ref 70–130)
GLUCOSE BLDC GLUCOMTR-MCNC: 149 MG/DL (ref 70–130)
GLUCOSE SERPL-MCNC: 173 MG/DL (ref 65–99)
HCO3 BLDA-SCNC: 23.8 MMOL/L (ref 22–28)
HCO3 BLDA-SCNC: 26.9 MMOL/L (ref 22–28)
HCT VFR BLD AUTO: 29.1 % (ref 34–46.6)
HEMODILUTION: NO
HEMODILUTION: NO
HGB BLD-MCNC: 9.6 G/DL (ref 12–15.9)
IMM GRANULOCYTES # BLD AUTO: 0.05 10*3/MM3 (ref 0–0.05)
IMM GRANULOCYTES NFR BLD AUTO: 0.4 % (ref 0–0.5)
INHALED O2 CONCENTRATION: 30 %
INHALED O2 CONCENTRATION: 30 %
LYMPHOCYTES # BLD AUTO: 0.73 10*3/MM3 (ref 0.7–3.1)
LYMPHOCYTES NFR BLD AUTO: 6.2 % (ref 19.6–45.3)
MAGNESIUM SERPL-MCNC: 2 MG/DL (ref 1.6–2.4)
MCH RBC QN AUTO: 28.9 PG (ref 26.6–33)
MCHC RBC AUTO-ENTMCNC: 33 G/DL (ref 31.5–35.7)
MCV RBC AUTO: 87.7 FL (ref 79–97)
MODALITY: ABNORMAL
MODALITY: ABNORMAL
MONOCYTES # BLD AUTO: 0.74 10*3/MM3 (ref 0.1–0.9)
MONOCYTES NFR BLD AUTO: 6.3 % (ref 5–12)
NEUTROPHILS NFR BLD AUTO: 10.15 10*3/MM3 (ref 1.7–7)
NEUTROPHILS NFR BLD AUTO: 86 % (ref 42.7–76)
O2 A-A PPRESDIFF RESPIRATORY: 0.4 MMHG
O2 A-A PPRESDIFF RESPIRATORY: 0.5 MMHG
PAW @ PEAK INSP FLOW SETTING VENT: 13 CMH2O
PCO2 BLDA: 38.9 MM HG (ref 35–45)
PCO2 BLDA: 42.9 MM HG (ref 35–45)
PEEP RESPIRATORY: 5 CM[H2O]
PEEP RESPIRATORY: 5 CM[H2O]
PH BLDA: 7.39 PH UNITS (ref 7.35–7.45)
PH BLDA: 7.41 PH UNITS (ref 7.35–7.45)
PHOSPHATE SERPL-MCNC: 2.7 MG/DL (ref 2.5–4.5)
PLATELET # BLD AUTO: 74 10*3/MM3 (ref 140–450)
PMV BLD AUTO: 12.5 FL (ref 6–12)
PO2 BLD: 243 MM[HG] (ref 0–500)
PO2 BLD: 267 MM[HG] (ref 0–500)
PO2 BLDA: 73 MM HG (ref 80–100)
PO2 BLDA: 80.1 MM HG (ref 80–100)
POTASSIUM SERPL-SCNC: 3.1 MMOL/L (ref 3.5–5.2)
PSV: 7 CMH2O
RBC # BLD AUTO: 3.32 10*6/MM3 (ref 3.77–5.28)
SAO2 % BLDCOA: 94.5 % (ref 92–98.5)
SAO2 % BLDCOA: 95.7 % (ref 92–98.5)
SET MECH RESP RATE: 20
SODIUM SERPL-SCNC: 142 MMOL/L (ref 136–145)
TOTAL RATE: 18 BREATHS/MINUTE
TOTAL RATE: 21 BREATHS/MINUTE
VENTILATOR MODE: ABNORMAL
VENTILATOR MODE: AC
VT ON VENT VENT: 400 ML
WBC NRBC COR # BLD AUTO: 11.8 10*3/MM3 (ref 3.4–10.8)

## 2024-11-07 PROCEDURE — 71045 X-RAY EXAM CHEST 1 VIEW: CPT

## 2024-11-07 PROCEDURE — 94799 UNLISTED PULMONARY SVC/PX: CPT

## 2024-11-07 PROCEDURE — 25010000002 CEFTRIAXONE PER 250 MG: Performed by: INTERNAL MEDICINE

## 2024-11-07 PROCEDURE — 94760 N-INVAS EAR/PLS OXIMETRY 1: CPT

## 2024-11-07 PROCEDURE — 25010000002 FUROSEMIDE PER 20 MG: Performed by: INTERNAL MEDICINE

## 2024-11-07 PROCEDURE — 94003 VENT MGMT INPAT SUBQ DAY: CPT

## 2024-11-07 PROCEDURE — 83735 ASSAY OF MAGNESIUM: CPT

## 2024-11-07 PROCEDURE — 94761 N-INVAS EAR/PLS OXIMETRY MLT: CPT

## 2024-11-07 PROCEDURE — 82948 REAGENT STRIP/BLOOD GLUCOSE: CPT

## 2024-11-07 PROCEDURE — 82803 BLOOD GASES ANY COMBINATION: CPT

## 2024-11-07 PROCEDURE — 25010000002 POTASSIUM CHLORIDE 10 MEQ/100ML SOLUTION

## 2024-11-07 PROCEDURE — 84100 ASSAY OF PHOSPHORUS: CPT

## 2024-11-07 PROCEDURE — 80048 BASIC METABOLIC PNL TOTAL CA: CPT | Performed by: INTERNAL MEDICINE

## 2024-11-07 PROCEDURE — 82803 BLOOD GASES ANY COMBINATION: CPT | Performed by: INTERNAL MEDICINE

## 2024-11-07 PROCEDURE — 25010000002 FENTANYL CITRATE (PF) 50 MCG/ML SOLUTION: Performed by: HOSPITALIST

## 2024-11-07 PROCEDURE — 85025 COMPLETE CBC W/AUTO DIFF WBC: CPT | Performed by: INTERNAL MEDICINE

## 2024-11-07 RX ORDER — FUROSEMIDE 10 MG/ML
40 INJECTION INTRAMUSCULAR; INTRAVENOUS ONCE
Status: COMPLETED | OUTPATIENT
Start: 2024-11-07 | End: 2024-11-07

## 2024-11-07 RX ORDER — POTASSIUM CHLORIDE 7.45 MG/ML
10 INJECTION INTRAVENOUS
Status: DISPENSED | OUTPATIENT
Start: 2024-11-07 | End: 2024-11-07

## 2024-11-07 RX ADMIN — Medication 10 ML: at 20:17

## 2024-11-07 RX ADMIN — Medication 10 ML: at 09:00

## 2024-11-07 RX ADMIN — FUROSEMIDE 40 MG: 10 INJECTION, SOLUTION INTRAMUSCULAR; INTRAVENOUS at 08:15

## 2024-11-07 RX ADMIN — CHLORHEXIDINE GLUCONATE 15 ML: 1.2 RINSE ORAL at 08:19

## 2024-11-07 RX ADMIN — Medication 10 ML: at 20:16

## 2024-11-07 RX ADMIN — MUPIROCIN 1 APPLICATION: 20 OINTMENT TOPICAL at 20:32

## 2024-11-07 RX ADMIN — DEXMEDETOMIDINE HYDROCHLORIDE 0.4 MCG/KG/HR: 4 INJECTION, SOLUTION INTRAVENOUS at 04:55

## 2024-11-07 RX ADMIN — FENTANYL CITRATE 50 MCG: 50 INJECTION, SOLUTION INTRAMUSCULAR; INTRAVENOUS at 01:44

## 2024-11-07 RX ADMIN — ACETAMINOPHEN 325MG 650 MG: 325 TABLET ORAL at 17:55

## 2024-11-07 RX ADMIN — POTASSIUM CHLORIDE 10 MEQ: 7.46 INJECTION, SOLUTION INTRAVENOUS at 13:11

## 2024-11-07 RX ADMIN — Medication 400 MCG: at 08:15

## 2024-11-07 RX ADMIN — PANTOPRAZOLE SODIUM 40 MG: 40 INJECTION, POWDER, FOR SOLUTION INTRAVENOUS at 05:03

## 2024-11-07 RX ADMIN — POTASSIUM CHLORIDE 10 MEQ: 7.46 INJECTION, SOLUTION INTRAVENOUS at 15:21

## 2024-11-07 RX ADMIN — ASPIRIN 81 MG: 81 TABLET, CHEWABLE ORAL at 08:15

## 2024-11-07 RX ADMIN — MUPIROCIN 1 APPLICATION: 20 OINTMENT TOPICAL at 08:19

## 2024-11-07 RX ADMIN — ESCITALOPRAM OXALATE 10 MG: 10 TABLET, FILM COATED ORAL at 08:15

## 2024-11-07 RX ADMIN — ACETAMINOPHEN 325MG 650 MG: 325 TABLET ORAL at 23:47

## 2024-11-07 RX ADMIN — CEFTRIAXONE 2000 MG: 2 INJECTION, POWDER, FOR SOLUTION INTRAMUSCULAR; INTRAVENOUS at 18:52

## 2024-11-07 RX ADMIN — CHLORHEXIDINE GLUCONATE 15 ML: 1.2 RINSE ORAL at 20:32

## 2024-11-07 NOTE — PROGRESS NOTES
PROGRESS NOTE      Patient Name: Kristyn Lugo  : 1940  MRN: [id number]  Primary Care Physician: Sean Fisher MD  Date of admission: 11/3/2024    Patient Care Team:  Sean Fisher MD as PCP - General (Internal Medicine)        Subjective   Subjective:     Patient still intubated, 30% fio2, off pressors    Review of systems:  All ROS negative      Allergies:    Allergies   Allergen Reactions    Fenofibrate Unknown - High Severity     Pt is unaware    Penicillins Unknown - High Severity       Objective   Exam:     Vital Signs  Temp:  [97.3 °F (36.3 °C)-98 °F (36.7 °C)] 97.8 °F (36.6 °C)  Heart Rate:  [58-68] 62  Resp:  [15-24] 21  BP: (100-132)/(53-85) 114/63  FiO2 (%):  [30 %-99 %] 30 %  SpO2:  [94 %-100 %] 97 %  on   ;   Device (Oxygen Therapy): ventilator  Body mass index is 33.99 kg/m².    General:  elderly  female intubated sedated.    Head:      Normocephalic and atraumatic.    Eyes:      PERRL/EOM intact, conjunctivae and sclerae clear without nystagmus.    Neck:      No masses, thyromegaly,  trachea central with normal respiratory effort   Lungs:    Clear bilaterally to auscultation.    Heart:      Regular rate and rhythm, no murmur no gallop  Abd:        Soft, nontender, not distended, bowel sounds positive, no shifting dullness   Pulses:   Pulses palpable  Extr:        No cyanosis or clubbing--mild edema.    Neuro:    sedated  Skin:       Intact without lesions or rashes.    Psych:    Alert and cooperative; normal mood and affect; .      Results Review:  I have personally reviewed most recent Data :  CBC    Results from last 7 days   Lab Units 24  0450 24  0443 24  0322 24  1203 24  0526 24  1546   WBC 10*3/mm3 11.80* 19.97* 27.79* 35.25* 32.40* 17.23*   HEMOGLOBIN g/dL 9.6* 9.9* 10.7* 11.7* 11.6* 10.9*   PLATELETS 10*3/mm3 74* 84* 103* 156 182 158     CMP   Results from last 7 days   Lab Units 24  0450 24  0443 24  0322  11/04/24  1823 11/04/24  1203 11/04/24  0526 11/03/24  1546   SODIUM mmol/L 142 143 140 144 139 141 140   POTASSIUM mmol/L 3.1* 3.5 3.4* 3.7 3.8 2.9* 3.6   CHLORIDE mmol/L 108* 111* 108* 109* 109* 109* 107   CO2 mmol/L 23.3 18.5* 18.0* 17.5* 15.5* 15.0* 19.9*   BUN mg/dL 50* 42* 39* 39* 37* 36* 30*   CREATININE mg/dL 1.50* 1.70* 1.99* 2.15* 2.54* 2.41* 2.17*   GLUCOSE mg/dL 173* 124* 118* 128* 136* 126* 97   ALBUMIN g/dL  --   --   --   --  2.9*  --  3.1*   BILIRUBIN mg/dL  --   --   --   --  0.6  --  0.4   ALK PHOS U/L  --   --   --   --  102  --  53   AST (SGOT) U/L  --   --   --   --  42*  --  33*   ALT (SGPT) U/L  --   --   --   --  22  --  18     ABG    Results from last 7 days   Lab Units 11/07/24  0945 11/07/24  0333 11/06/24  1138 11/06/24  0440 11/05/24  0703 11/05/24  0334 11/04/24  1207   PH, ARTERIAL pH units 7.405 7.395 7.396 7.305* 7.364 7.322* 7.285*   PCO2, ARTERIAL mm Hg 42.9 38.9 34.1* 40.8 35.3 38.2 36.8   PO2 ART mm Hg 73.0* 80.1 59.7* 74.0* 83.9 80.1 107.3*   O2 SATURATION ART % 94.5 95.7 90.6* 93.2 96.0 94.8 97.5   BASE EXCESS ART mmol/L 1.9 -0.9* -3.5* -5.7* -4.7* -5.8* -8.5*     XR Chest 1 View    Result Date: 11/7/2024  Endotracheal tube with tip approximately 4.5 cm above the anaya. Left IJ central venous catheter with tip overlying the mid SVC. Enteric tube overlying the stomach and exiting field-of-view.  Increasing hazy and more dense opacities at the medial right lower lobe which are suspicious for infiltrate. No measurable pleural effusion or pneumothorax. Stable enlarged cardiac silhouette. No focal osseous abnormality.  This report was finalized on 11/7/2024 7:17 AM by Dr. Brett Olsen M.D on Workstation: [id number]      XR Chest 1 View    Result Date: 11/6/2024  1. Mild cardiomegaly. 2. Underinflation of the lungs with mild increased density in the lung bases as described. 3. Satisfactory position of life support devices. 4. No significant pneumothorax is seen.  This report was  finalized on 11/6/2024 7:08 AM by Dr. Vishal Pereyra M.D on Workstation: [id number]       Results for orders placed during the hospital encounter of 11/03/24    Adult Transthoracic Echo Complete W/ Cont if Necessary Per Protocol    Interpretation Summary    Left ventricular systolic function is normal. Calculated left ventricular EF = 52.4%    Left ventricular wall thickness is consistent with mild septal asymmetric hypertrophy.    Left ventricular diastolic function is consistent with age.    Mild aortic valve stenosis is present.    Peak velocity of the flow distal to the aortic valve is 251 cm/s. Aortic valve maximum pressure gradient is 25 mmHg. Aortic valve mean pressure gradient is 14 mmHg. Aortic valve dimensionless index is 0.6 .    Mild mitral valve stenosis is present. The mitral valve mean gradient is 5 mmHg.    Estimated right ventricular systolic pressure from tricuspid regurgitation is normal (<35 mmHg).    Scheduled Meds:aspirin, 81 mg, Nasogastric, Daily  cefTRIAXone, 2,000 mg, Intravenous, Q24H  chlorhexidine, 15 mL, Mouth/Throat, Q12H  folic acid, 400 mcg, Nasogastric, Daily  [START ON 11/8/2024] lansoprazole, 30 mg, Nasogastric, QAM AC  mupirocin, 1 Application, Each Nare, BID  sodium chloride, 10 mL, Intravenous, Q12H  sodium chloride, 10 mL, Intravenous, Q12H  sodium chloride, 10 mL, Intravenous, Q12H  sodium chloride, 10 mL, Intravenous, Q12H  sodium chloride, 10 mL, Intravenous, Q12H      Continuous Infusions:dexmedetomidine, 0.2-1.5 mcg/kg/hr, Last Rate: Stopped (11/07/24 0658)  norepinephrine, 0.02-0.3 mcg/kg/min, Last Rate: Stopped (11/06/24 0117)  phenylephrine, 0.5-3 mcg/kg/min, Last Rate: Stopped (11/05/24 1720)      PRN Meds:  acetaminophen **OR** acetaminophen    fentaNYL citrate (PF)    nitroglycerin    sodium chloride    sodium chloride    sodium chloride    sodium chloride    sodium chloride    Assessment & Plan   Assessment and Plan:         Acute  pyelonephritis    ASSESSMENT:  Acute kidney injury  Chronic kidney disease, stage 3, with baseline creatinine roughly 1.0mg/dL; felt to be secondary to hypertension and advanced age  Metabolic acidosis  Hypokalemia  Right hydronephrosis with nephrolithiasis  Right distal ureteral calculi, s/p cysto with stent insertion 11/3/24  Sepsis  Acute hypoxemic respiratory failure  Chronic diastolic heart failure    PLAN :     Renal function better, UOP improvin.4L in last 24 hr   Noted lasix 40mg IV x 1 given today  CXR  with more opacities/infiltrates, no pleural effusion  Now off pressors  Lasix 40 mg given as patient is going to be extubated later today  Follow volume status closely  EMMA most likely multifactorial and secondary to hemodynamic insults, sepsis and right hydronephrosis, most likely has ATN  Making urine. Urine studies c/w prerenal picture   Proteinuria 1.5 gm f/u with SPEP  Hypokalemia: will give Kcl IV x 4, Mag and Phos acceptable  Metabolic acidosis resolved  Continue to follow-up with the volume status closely  Keep MAP>65  Antibiotics as per primary team. Currently on Rocephin  Will request strict I/Os, daily labs  Discussed in detail with RN  No urgent need for RRT but will follow closely    Note transcribed for Dr Laura    Electronically signed by JENNIFER Quiroz,   [facility] kidney consultant  [phone]  2024  11:39 EST

## 2024-11-07 NOTE — PROGRESS NOTES
"  Daily Progress Note.   [facility] CORONARY CARE  11/7/2024    Patient:  Name:  Kristyn Lugo  MRN:  [id number]  1940  84 y.o.  female         CC: septic shock resp failure on mech vent with pyelonephritis   Summary:  Per Dr Catherine:  \"84-year-old female who presented with sepsis and found to be in septic shock secondary to acute pyelonephritis and underwent cystoscopy with stent placement for ureteral calculi. Came back to the ICU intubated requiring vasopressor support. Continue antibiotics, Klebsiella noted on blood cultures by PCR. \"    Interval History:  Maintains on mechanical ventilation  More awake and alert  Afebrile  Off vasopressors    Physical Exam:  /63   Pulse 62   Temp 97.7 °F (36.5 °C)   Resp 21   Ht 154 cm (60.63\")   Wt 80.6 kg (177 lb 11.1 oz)   SpO2 97%   BMI 33.99 kg/m²   Body mass index is 33.99 kg/m².    Intake/Output Summary (Last 24 hours) at 11/7/2024 0727  Last data filed at 11/7/2024 0455  Gross per 24 hour   Intake 392 ml   Output 1475 ml   Net -1083 ml   Vent Settings          Resp Rate (Set): 20  Pressure Support (cm H2O): 7 cm H20  FiO2 (%): 30 %  PEEP/CPAP (cm H2O): 5 cm H20    Minute Ventilation (L/min) (Obs): 6.85 L/min  Resp Rate (Observed) Vent: 18     I:E Ratio (Obs): 1:1.4    PIP Observed (cm H2O): 13 cm H2O  Plateau Pressure (cm H2O): 0 cm H2O  Driving Pressure (cm H2O): -4.7 cm H2O    General appearance: ill on mech vent  Eyes: anicteric sclerae,  ;    HENT: Atraumatic; oropharynx +ET tube LIJ CVC  Lungs: javier mech synchronous,   CV: RRR, no rub   Abdomen: obese soft non rigid  Extremities: mild  peripheral edema   Skin: WWP no diffuse visible rash  Psych/neuro awake alert following commands    Data Review:  Notable Labs:  Results from last 7 days   Lab Units 11/07/24  0450 11/06/24  0443 11/05/24  0322 11/04/24  1203 11/04/24  0526 11/03/24  1546   WBC 10*3/mm3 11.80* 19.97* 27.79* 35.25* 32.40* 17.23*   HEMOGLOBIN g/dL 9.6* 9.9* " 10.7* 11.7* 11.6* 10.9*   PLATELETS 10*3/mm3 74* 84* 103* 156 182 158     Results from last 7 days   Lab Units 11/07/24 0450 11/06/24 0443 11/05/24 0322 11/04/24 1823 11/04/24 1203 11/04/24  0526 11/03/24  1546   SODIUM mmol/L 142 143 140 144 139 141 140   POTASSIUM mmol/L 3.1* 3.5 3.4* 3.7 3.8 2.9* 3.6   CHLORIDE mmol/L 108* 111* 108* 109* 109* 109* 107   CO2 mmol/L 23.3 18.5* 18.0* 17.5* 15.5* 15.0* 19.9*   BUN mg/dL 50* 42* 39* 39* 37* 36* 30*   CREATININE mg/dL 1.50* 1.70* 1.99* 2.15* 2.54* 2.41* 2.17*   GLUCOSE mg/dL 173* 124* 118* 128* 136* 126* 97   CALCIUM mg/dL 8.3* 7.3* 7.1* 7.6* 7.6* 7.9* 8.4*   MAGNESIUM mg/dL 2.0  --   --   --   --  2.0 1.5*   PHOSPHORUS mg/dL 2.7  --   --   --   --  3.2  --    Estimated Creatinine Clearance: 26.1 mL/min (A) (by C-G formula based on SCr of 1.5 mg/dL (H)).    Results from last 7 days   Lab Units 11/07/24 0450 11/06/24 0443 11/05/24 0322 11/04/24 1823 11/04/24 1203 11/03/24  1800 11/03/24  1546   AST (SGOT) U/L  --   --   --   --  42*  --  33*   ALT (SGPT) U/L  --   --   --   --  22  --  18   PROCALCITONIN ng/mL  --   --   --   --   --   --  93.60*   LACTATE mmol/L  --   --  1.6 2.1* 2.2*   < > 4.9*   PLATELETS 10*3/mm3 74* 84* 103*  --  156   < > 158    < > = values in this interval not displayed.       Results from last 7 days   Lab Units 11/07/24  0333 11/06/24  1138 11/06/24  0440 11/05/24  0703 11/05/24  0334 11/04/24  1207 11/04/24  0248 11/03/24  2248 11/03/24  1558   PH, ARTERIAL pH units 7.395 7.396 7.305* 7.364 7.322* 7.285* 7.251* 7.290* 7.396   PCO2, ARTERIAL mm Hg 38.9 34.1* 40.8 35.3 38.2 36.8 40.3 34.7* 29.6*   PO2 ART mm Hg 80.1 59.7* 74.0* 83.9 80.1 107.3* 91.5 298.5* 88.1   HCO3 ART mmol/L 23.8 20.9* 20.3* 20.2* 19.8* 17.5* 17.7* 16.7* 18.2*       Imaging:  Reviewed chest images personally from past 3 days    Cxr - vascular congestion ?right medial infiltrate    ASSESSMENT  /  PLAN:  Right hydronephrosis with nephrolithiasis  Right distal  ureteral calculi s/p cysto with stent insertion 11/3/24  EMMA  Sepsis vs hypovolemic shock  Hypotension  Elevated troponin  Elevated proBNP  Acute hypoxemic respiratory failure  Valvular heart disease  Chronic diastolic heart failure  Anemia  Chronic constipation vs diarrhea  Hx of HTN  Aneurysm of ascending aorta  HLD  Thrombocytopenia consumptive secondary to sepsis continue to monitor observe for any signs of active bleeding  Klebseilla pneumoniae bacteremia    Off vasopressors  Continue ceftriaxone  Renal function improving appreciate nephrology - agree diuresis today as per nephrology plan yesterday, will see recs today. - lasix today  Hemodynamic improved off vasopressors now over 24 hours    Mechanical ventilation reviewed   chest x-ray reviewed-vascular congestion and very weak still, will see if some diuresis today can help prior to extubation attempt, yesterday weakness limiting factor,much more awake alert today  Adjustments as appropriate     Cards note reviewed appreciate consultation     DW family at bedside yesterday all questions answered.       Total critical care time was 38 minutes, excluding any separately billable procedure time.  Time did not overlap with any other provider.    Electronically signed by Neymar Gerber MD, 11/07/24, 10:04 AM EST.

## 2024-11-07 NOTE — PROGRESS NOTES
"Nutrition Services    Patient Name: Kristyn Lugo  YOB: 1940  MRN: [id number]  Admission date: 11/3/2024    PROGRESS NOTE      Encounter Information: Checking in on TF initiation and tolerance. Pt off pressors. Pt discussed in multidisciplinary rounds. Pt extubated this morning.        PO Diet: NPO Diet NPO Type: Tube Feeding   PO Supplements: -   PO Intake:  -       Current nutrition support: Peptamen AF @ 50 mL/hr with 10 mL/hr FWF   Nutrition support review: Documented at 30 mL/hr although nurse reported in rounds that TF infusing at 50 mL/hr       Labs (reviewed below): Hypokalemia - replacement given       GI Function:  + BM 11/3       Nutrition Intervention Updates: Continue TF at goal as ordered.        Results from last 7 days   Lab Units 11/07/24  0450 11/06/24  0443 11/05/24  0322 11/04/24  1823 11/04/24  1203 11/04/24  0526 11/03/24  1546   SODIUM mmol/L 142 143 140   < > 139   < > 140   POTASSIUM mmol/L 3.1* 3.5 3.4*   < > 3.8   < > 3.6   CHLORIDE mmol/L 108* 111* 108*   < > 109*   < > 107   CO2 mmol/L 23.3 18.5* 18.0*   < > 15.5*   < > 19.9*   BUN mg/dL 50* 42* 39*   < > 37*   < > 30*   CREATININE mg/dL 1.50* 1.70* 1.99*   < > 2.54*   < > 2.17*   CALCIUM mg/dL 8.3* 7.3* 7.1*   < > 7.6*   < > 8.4*   BILIRUBIN mg/dL  --   --   --   --  0.6  --  0.4   ALK PHOS U/L  --   --   --   --  102  --  53   ALT (SGPT) U/L  --   --   --   --  22  --  18   AST (SGOT) U/L  --   --   --   --  42*  --  33*   GLUCOSE mg/dL 173* 124* 118*   < > 136*   < > 97    < > = values in this interval not displayed.     Results from last 7 days   Lab Units 11/07/24  0450 11/04/24  1203 11/04/24  0526 11/03/24  1546 11/03/24  1546   MAGNESIUM mg/dL 2.0  --  2.0  --  1.5*   PHOSPHORUS mg/dL 2.7  --  3.2   < >  --    HEMOGLOBIN g/dL 9.6*   < > 11.6*  --  10.9*   HEMATOCRIT % 29.1*   < > 34.0  --  32.3*    < > = values in this interval not displayed.     No results found for: \"COVID19\"  No results found for: " "\"HGBA1C\"    Wt Readings from Last 10 Encounters:   11/07/24 0452 80.6 kg (177 lb 11.1 oz)   11/04/24 1840 78 kg (171 lb 15.3 oz)   11/04/24 0600 78.4 kg (172 lb 13.5 oz)   11/03/24 2000 77 kg (169 lb 12.1 oz)   11/03/24 1429 74.8 kg (164 lb 14.5 oz)   10/16/23 0945 74.8 kg (165 lb)   10/05/23 1505 74.4 kg (164 lb)       RD to follow up per protocol.    Electronically signed by:  Noemy Bennett RD  11/07/24 13:25 EST    "

## 2024-11-08 ENCOUNTER — APPOINTMENT (OUTPATIENT)
Dept: CARDIOLOGY | Facility: HOSPITAL | Age: 84
DRG: 853 | End: 2024-11-08
Payer: MEDICARE

## 2024-11-08 ENCOUNTER — APPOINTMENT (OUTPATIENT)
Dept: GENERAL RADIOLOGY | Facility: HOSPITAL | Age: 84
DRG: 853 | End: 2024-11-08
Payer: MEDICARE

## 2024-11-08 LAB
ANION GAP SERPL CALCULATED.3IONS-SCNC: 9 MMOL/L (ref 5–15)
BACTERIA SPEC AEROBE CULT: NORMAL
BASOPHILS # BLD AUTO: 0.03 10*3/MM3 (ref 0–0.2)
BASOPHILS NFR BLD AUTO: 0.3 % (ref 0–1.5)
BH CV UPPER VENOUS LEFT AXILLARY AUGMENT: NORMAL
BH CV UPPER VENOUS LEFT AXILLARY COMPRESS: NORMAL
BH CV UPPER VENOUS LEFT AXILLARY PHASIC: NORMAL
BH CV UPPER VENOUS LEFT AXILLARY SPONT: NORMAL
BH CV UPPER VENOUS LEFT BASILIC FOREARM COMPRESS: NORMAL
BH CV UPPER VENOUS LEFT BASILIC UPPER COMPRESS: NORMAL
BH CV UPPER VENOUS LEFT BRACHIAL COMPRESS: NORMAL
BH CV UPPER VENOUS LEFT CEPHALIC FOREARM COMPRESS: NORMAL
BH CV UPPER VENOUS LEFT CEPHALIC UPPER COLOR: 1
BH CV UPPER VENOUS LEFT CEPHALIC UPPER COMPRESS: NORMAL
BH CV UPPER VENOUS LEFT CEPHALIC UPPER THROMBUS: NORMAL
BH CV UPPER VENOUS LEFT INTERNAL JUGULAR AUGMENT: NORMAL
BH CV UPPER VENOUS LEFT INTERNAL JUGULAR COMPRESS: NORMAL
BH CV UPPER VENOUS LEFT INTERNAL JUGULAR PHASIC: NORMAL
BH CV UPPER VENOUS LEFT INTERNAL JUGULAR SPONT: NORMAL
BH CV UPPER VENOUS LEFT MED CUBITAL COLOR: 1
BH CV UPPER VENOUS LEFT MED CUBITAL COMPRESS: NORMAL
BH CV UPPER VENOUS LEFT MED CUBITAL THROMBUS: NORMAL
BH CV UPPER VENOUS LEFT RADIAL COMPRESS: NORMAL
BH CV UPPER VENOUS LEFT SUBCLAVIAN AUGMENT: NORMAL
BH CV UPPER VENOUS LEFT SUBCLAVIAN COMPRESS: NORMAL
BH CV UPPER VENOUS LEFT SUBCLAVIAN PHASIC: NORMAL
BH CV UPPER VENOUS LEFT SUBCLAVIAN SPONT: NORMAL
BH CV UPPER VENOUS LEFT ULNAR COMPRESS: NORMAL
BH CV UPPER VENOUS RIGHT INTERNAL JUGULAR AUGMENT: NORMAL
BH CV UPPER VENOUS RIGHT INTERNAL JUGULAR COMPRESS: NORMAL
BH CV UPPER VENOUS RIGHT INTERNAL JUGULAR PHASIC: NORMAL
BH CV UPPER VENOUS RIGHT INTERNAL JUGULAR SPONT: NORMAL
BH CV UPPER VENOUS RIGHT SUBCLAVIAN AUGMENT: NORMAL
BH CV UPPER VENOUS RIGHT SUBCLAVIAN COMPRESS: NORMAL
BH CV UPPER VENOUS RIGHT SUBCLAVIAN PHASIC: NORMAL
BH CV UPPER VENOUS RIGHT SUBCLAVIAN SPONT: NORMAL
BH CV VAS PRELIMINARY FINDINGS SCRIPTING: 1
BUN SERPL-MCNC: 51 MG/DL (ref 8–23)
BUN/CREAT SERPL: 40.2 (ref 7–25)
CALCIUM SPEC-SCNC: 8.1 MG/DL (ref 8.6–10.5)
CHLORIDE SERPL-SCNC: 111 MMOL/L (ref 98–107)
CO2 SERPL-SCNC: 28 MMOL/L (ref 22–29)
CREAT SERPL-MCNC: 1.27 MG/DL (ref 0.57–1)
DEPRECATED RDW RBC AUTO: 44.2 FL (ref 37–54)
EGFRCR SERPLBLD CKD-EPI 2021: 41.5 ML/MIN/1.73
EOSINOPHIL # BLD AUTO: 0.19 10*3/MM3 (ref 0–0.4)
EOSINOPHIL NFR BLD AUTO: 2.1 % (ref 0.3–6.2)
ERYTHROCYTE [DISTWIDTH] IN BLOOD BY AUTOMATED COUNT: 13.6 % (ref 12.3–15.4)
GLUCOSE BLDC GLUCOMTR-MCNC: 136 MG/DL (ref 70–130)
GLUCOSE BLDC GLUCOMTR-MCNC: 137 MG/DL (ref 70–130)
GLUCOSE BLDC GLUCOMTR-MCNC: 142 MG/DL (ref 70–130)
GLUCOSE BLDC GLUCOMTR-MCNC: 151 MG/DL (ref 70–130)
GLUCOSE SERPL-MCNC: 143 MG/DL (ref 65–99)
HCT VFR BLD AUTO: 29 % (ref 34–46.6)
HGB BLD-MCNC: 9.4 G/DL (ref 12–15.9)
IMM GRANULOCYTES # BLD AUTO: 0.15 10*3/MM3 (ref 0–0.05)
IMM GRANULOCYTES NFR BLD AUTO: 1.7 % (ref 0–0.5)
LYMPHOCYTES # BLD AUTO: 0.81 10*3/MM3 (ref 0.7–3.1)
LYMPHOCYTES NFR BLD AUTO: 9.2 % (ref 19.6–45.3)
MCH RBC QN AUTO: 28.8 PG (ref 26.6–33)
MCHC RBC AUTO-ENTMCNC: 32.4 G/DL (ref 31.5–35.7)
MCV RBC AUTO: 89 FL (ref 79–97)
MONOCYTES # BLD AUTO: 1.06 10*3/MM3 (ref 0.1–0.9)
MONOCYTES NFR BLD AUTO: 12 % (ref 5–12)
NEUTROPHILS NFR BLD AUTO: 6.6 10*3/MM3 (ref 1.7–7)
NEUTROPHILS NFR BLD AUTO: 74.7 % (ref 42.7–76)
PLATELET # BLD AUTO: 93 10*3/MM3 (ref 140–450)
PMV BLD AUTO: 12.3 FL (ref 6–12)
POTASSIUM SERPL-SCNC: 3.4 MMOL/L (ref 3.5–5.2)
RBC # BLD AUTO: 3.26 10*6/MM3 (ref 3.77–5.28)
SODIUM SERPL-SCNC: 148 MMOL/L (ref 136–145)
WBC NRBC COR # BLD AUTO: 8.84 10*3/MM3 (ref 3.4–10.8)

## 2024-11-08 PROCEDURE — 94761 N-INVAS EAR/PLS OXIMETRY MLT: CPT

## 2024-11-08 PROCEDURE — 25010000002 CEFTRIAXONE PER 250 MG: Performed by: INTERNAL MEDICINE

## 2024-11-08 PROCEDURE — 94760 N-INVAS EAR/PLS OXIMETRY 1: CPT

## 2024-11-08 PROCEDURE — 94640 AIRWAY INHALATION TREATMENT: CPT

## 2024-11-08 PROCEDURE — 93971 EXTREMITY STUDY: CPT | Performed by: SURGERY

## 2024-11-08 PROCEDURE — 94664 DEMO&/EVAL PT USE INHALER: CPT

## 2024-11-08 PROCEDURE — 93971 EXTREMITY STUDY: CPT

## 2024-11-08 PROCEDURE — 85025 COMPLETE CBC W/AUTO DIFF WBC: CPT | Performed by: INTERNAL MEDICINE

## 2024-11-08 PROCEDURE — 82948 REAGENT STRIP/BLOOD GLUCOSE: CPT

## 2024-11-08 PROCEDURE — 80048 BASIC METABOLIC PNL TOTAL CA: CPT | Performed by: INTERNAL MEDICINE

## 2024-11-08 PROCEDURE — 25010000002 POTASSIUM CHLORIDE 10 MEQ/100ML SOLUTION

## 2024-11-08 PROCEDURE — 71045 X-RAY EXAM CHEST 1 VIEW: CPT

## 2024-11-08 PROCEDURE — 92610 EVALUATE SWALLOWING FUNCTION: CPT | Performed by: SPEECH-LANGUAGE PATHOLOGIST

## 2024-11-08 PROCEDURE — 94799 UNLISTED PULMONARY SVC/PX: CPT

## 2024-11-08 RX ORDER — TRAMADOL HYDROCHLORIDE 50 MG/1
50 TABLET ORAL EVERY 6 HOURS PRN
Status: DISPENSED | OUTPATIENT
Start: 2024-11-08 | End: 2024-11-13

## 2024-11-08 RX ORDER — IPRATROPIUM BROMIDE AND ALBUTEROL SULFATE 2.5; .5 MG/3ML; MG/3ML
3 SOLUTION RESPIRATORY (INHALATION) EVERY 6 HOURS PRN
Status: DISCONTINUED | OUTPATIENT
Start: 2024-11-08 | End: 2024-11-09

## 2024-11-08 RX ORDER — POTASSIUM CHLORIDE 7.45 MG/ML
10 INJECTION INTRAVENOUS
Status: COMPLETED | OUTPATIENT
Start: 2024-11-08 | End: 2024-11-08

## 2024-11-08 RX ADMIN — ASPIRIN 81 MG: 81 TABLET, CHEWABLE ORAL at 08:20

## 2024-11-08 RX ADMIN — POTASSIUM CHLORIDE 10 MEQ: 7.46 INJECTION, SOLUTION INTRAVENOUS at 16:39

## 2024-11-08 RX ADMIN — Medication 400 MCG: at 08:20

## 2024-11-08 RX ADMIN — CEFTRIAXONE 2000 MG: 2 INJECTION, POWDER, FOR SOLUTION INTRAMUSCULAR; INTRAVENOUS at 18:00

## 2024-11-08 RX ADMIN — MUPIROCIN 1 APPLICATION: 20 OINTMENT TOPICAL at 08:21

## 2024-11-08 RX ADMIN — Medication 10 ML: at 21:11

## 2024-11-08 RX ADMIN — IPRATROPIUM BROMIDE AND ALBUTEROL SULFATE 3 ML: 2.5; .5 SOLUTION RESPIRATORY (INHALATION) at 12:30

## 2024-11-08 RX ADMIN — CHLORHEXIDINE GLUCONATE 15 ML: 1.2 RINSE ORAL at 21:10

## 2024-11-08 RX ADMIN — CHLORHEXIDINE GLUCONATE 15 ML: 1.2 RINSE ORAL at 08:30

## 2024-11-08 RX ADMIN — ACETAMINOPHEN 325MG 650 MG: 325 TABLET ORAL at 14:11

## 2024-11-08 RX ADMIN — ACETAMINOPHEN 325MG 650 MG: 325 TABLET ORAL at 18:00

## 2024-11-08 RX ADMIN — Medication 10 ML: at 08:27

## 2024-11-08 RX ADMIN — POTASSIUM CHLORIDE 10 MEQ: 7.46 INJECTION, SOLUTION INTRAVENOUS at 18:00

## 2024-11-08 RX ADMIN — Medication 10 ML: at 21:10

## 2024-11-08 RX ADMIN — LANSOPRAZOLE 30 MG: 15 TABLET, ORALLY DISINTEGRATING ORAL at 08:20

## 2024-11-08 RX ADMIN — ACETAMINOPHEN 325MG 650 MG: 325 TABLET ORAL at 08:21

## 2024-11-08 RX ADMIN — TRAMADOL HYDROCHLORIDE 50 MG: 50 TABLET, FILM COATED ORAL at 21:46

## 2024-11-08 RX ADMIN — Medication 10 ML: at 08:30

## 2024-11-08 RX ADMIN — POTASSIUM CHLORIDE 10 MEQ: 7.46 INJECTION, SOLUTION INTRAVENOUS at 15:39

## 2024-11-08 RX ADMIN — POTASSIUM CHLORIDE 10 MEQ: 7.46 INJECTION, SOLUTION INTRAVENOUS at 14:35

## 2024-11-08 NOTE — SIGNIFICANT NOTE
11/08/24 1450   OTHER   Discipline physical therapist   Rehab Time/Intention   Session Not Performed other (see comments)  (awaiting UE doppler, will follow up, discussed w RN.)   Recommendation   PT - Next Appointment 11/09/24

## 2024-11-08 NOTE — PROGRESS NOTES
Nutrition Services    Patient Name: Kristyn Lugo  YOB: 1940  MRN: [id number]  Admission date: 11/3/2024    PROGRESS NOTE      Encounter Information: Checking in on TF tolerance. Pt's nurse alerted RD that cortrak initially documented at 85 cm in gastric position. Tube now at 74 cm. RD paused feeds and retraced cortrak. Tube in gastric position. Per pt and pt's daughter, bedside swallow eval attempted 11/7, but SLP to see pt today. FWF increased this morning by nephrology r/t hypernatremia.        PO Diet: NPO Diet NPO Type: Tube Feeding   PO Supplements: -   PO Intake:  -       Current nutrition support: Peptamen AF @ 50 mL/hr with 60 mL/hr FWF   Nutrition support review: Infusing as ordered at time of RD visit       Labs (reviewed below): Hypokalemia - last replaced 11/7  Hypernatremia - FWF increased by nephrology        GI Function:  + BM 11/7, prior constipation        Nutrition Intervention Updates: Continue TF at goal as ordered.        Results from last 7 days   Lab Units 11/08/24  0441 11/07/24  0450 11/06/24  0443 11/04/24  1823 11/04/24  1203 11/04/24  0526 11/03/24  1546   SODIUM mmol/L 148* 142 143   < > 139   < > 140   POTASSIUM mmol/L 3.4* 3.1* 3.5   < > 3.8   < > 3.6   CHLORIDE mmol/L 111* 108* 111*   < > 109*   < > 107   CO2 mmol/L 28.0 23.3 18.5*   < > 15.5*   < > 19.9*   BUN mg/dL 51* 50* 42*   < > 37*   < > 30*   CREATININE mg/dL 1.27* 1.50* 1.70*   < > 2.54*   < > 2.17*   CALCIUM mg/dL 8.1* 8.3* 7.3*   < > 7.6*   < > 8.4*   BILIRUBIN mg/dL  --   --   --   --  0.6  --  0.4   ALK PHOS U/L  --   --   --   --  102  --  53   ALT (SGPT) U/L  --   --   --   --  22  --  18   AST (SGOT) U/L  --   --   --   --  42*  --  33*   GLUCOSE mg/dL 143* 173* 124*   < > 136*   < > 97    < > = values in this interval not displayed.     Results from last 7 days   Lab Units 11/08/24  0441 11/07/24  0450 11/04/24  1203 11/04/24  0526 11/03/24  1546 11/03/24  1546   MAGNESIUM mg/dL  --  2.0  --  " 2.0  --  1.5*   PHOSPHORUS mg/dL  --  2.7  --  3.2   < >  --    HEMOGLOBIN g/dL 9.4* 9.6*   < > 11.6*  --  10.9*   HEMATOCRIT % 29.0* 29.1*   < > 34.0  --  32.3*    < > = values in this interval not displayed.     No results found for: \"COVID19\"  No results found for: \"HGBA1C\"    Wt Readings from Last 10 Encounters:   11/08/24 0549 80 kg (176 lb 5.9 oz)   11/07/24 0452 80.6 kg (177 lb 11.1 oz)   11/04/24 1840 78 kg (171 lb 15.3 oz)   11/04/24 0600 78.4 kg (172 lb 13.5 oz)   11/03/24 2000 77 kg (169 lb 12.1 oz)   11/03/24 1429 74.8 kg (164 lb 14.5 oz)   10/16/23 0945 74.8 kg (165 lb)   10/05/23 1505 74.4 kg (164 lb)       RD to follow up per protocol.    Electronically signed by:  Noemy Bennett RD  11/08/24 12:14 EST    "

## 2024-11-08 NOTE — PLAN OF CARE
Goal Outcome Evaluation:  Plan of Care Reviewed With: patient           Outcome Evaluation: Clinical swallow evaluation completed. Recommend NPO and allow ice chips and water protocol. Meds via alternate route.    Anticipated Discharge Disposition (SLP): anticipate therapy at next level of care          SLP Swallowing Diagnosis: suspected pharyngeal dysphagia (11/08/24 1500)

## 2024-11-08 NOTE — PLAN OF CARE
Goal Outcome Evaluation: Pt remains in CCU in critical care status. Pt extubated today at 1130 to 6L nasal cannula. Pt AO x4 although pt is only able to mouth and produce a soft whisper at this time. Pt educated on importance of allowing muscles time to heal and encouraged to keep actively trying to vocalize.  CVL remains in place at this time due to lack of viable peripheral IV access and difficulty establishing peripheral access. Levo and dex remained off for entirety of this shift. SLP consulted due to pt failure of bedside swallow screen.

## 2024-11-08 NOTE — PROGRESS NOTES
[facility]     Mar/chart reviewed  Follow up septic shock with acute pyelonephritis  No increased abdominal pain    Vital Sign Min/Max for last 24 hours  Temp  Min: 98 °F (36.7 °C)  Max: 98.7 °F (37.1 °C)   BP  Min: 101/50  Max: 141/68   Pulse  Min: 75  Max: 93   Resp  Min: 18  Max: 18   SpO2  Min: 91 %  Max: 100 %   Flow (L/min) (Oxygen Therapy)  Min: 6  Max: 6   Weight  Min: 80 kg (176 lb 5.9 oz)  Max: 80 kg (176 lb 5.9 oz)     Appears ill, alert but some confusion  perrl, eomi, normal sclera  mmm, no jvd, trachea midline, neck supple,  chest cta bilaterally, no crackles, no wheezes,   rrr,   soft, nt, nd +bs,  no c/c/ e bilaterl ue edema left greater than right  Skin warm, dry no rashes    Labs: 11/8: reviewed:  Glucose 143  Bun 51  Cr 1.27  Na 148  K 3.4  Bicarb 28  Wbc 8.8  Hgb 9.4  Plts 93      ASSESSMENT  /  PLAN:  Right hydronephrosis with nephrolithiasis  Right distal ureteral calculi s/p cysto with stent insertion 11/3/24  EMMA  Sepsis vs hypovolemic shock  Hypotension  Elevated troponin  Elevated proBNP  Acute hypoxemic respiratory failure  Valvular heart disease  Chronic diastolic heart failure  Anemia  Chronic constipation vs diarrhea  Hx of HTN  Aneurysm of ascending aorta  HLD  Thrombocytopenia consumptive secondary to sepsis continue to monitor observe for any signs of active bleeding  Klebseilla pneumoniae bacteremia    Continue antibiotics  Needs swallow eval, pt, ot  Can transfer out of the unit at this point.  Catheter out when ok with nephrology/urology  Check left upper extremity u/s  Patient is new to me today

## 2024-11-08 NOTE — THERAPY EVALUATION
Acute Care - Speech Language Pathology   Swallow Initial Evaluation [facility]     Patient Name: Kristyn Lugo  : 1940  MRN: [id number]  Today's Date: 2024               Admit Date: 11/3/2024    Visit Dx:     ICD-10-CM ICD-9-CM   1. Septic shock  A41.9 038.9    R65.21 785.52     995.92   2. Acute kidney injury  N17.9 584.9   3. Occult GI bleeding  R19.5 792.1   4. Acute respiratory failure with hypoxia  J96.01 518.81   5. Acute pyelonephritis  N10 590.10   6. Kidney stone on right side, distal UVJ with hydronephrosis  N20.0 592.0     Patient Active Problem List   Diagnosis    Lumbar spinal stenosis    Spondylolisthesis of lumbar region    Acute pyelonephritis     Past Medical History:   Diagnosis Date    Aortic valve regurgitation     Chronic kidney disease     Low back pain      Past Surgical History:   Procedure Laterality Date    CYSTOSCOPY W/ URETERAL STENT PLACEMENT Right 11/3/2024    Procedure: CYSTOSCOPY URETERAL CATHETER/STENT INSERTION;  Surgeon: Tony Ledesma MD;  Location: [facility];  Service: Urology;  Laterality: Right;       SLP Recommendation and Plan  SLP Swallowing Diagnosis: suspected pharyngeal dysphagia (24)  SLP Diet Recommendation: NPO, ice chips between meals after oral care, with supervision, water between meals after oral care, with supervision (24)     SLP Rec. for Method of Medication Administration: meds via alternate route (24)     Monitor for Signs of Aspiration: yes, notify SLP if any concerns (24)  Recommended Diagnostics: reassess via clinical swallow evaluation (24)  Swallow Criteria for Skilled Therapeutic Interventions Met: demonstrates skilled criteria (24)  Anticipated Discharge Disposition (SLP): anticipate therapy at next level of care (24)  Rehab Potential/Prognosis, Swallowing: good, to achieve stated therapy goals (24)  Therapy Frequency (Swallow): PRN  (11/08/24 1500)  Predicted Duration Therapy Intervention (Days): until discharge (11/08/24 1500)  Oral Care Recommendations: Oral Care BID/PRN (11/08/24 1500)                                        Outcome Evaluation: Clinical swallow evaluation completed. Recommend NPO and allow ice chips and water protocol. Meds via alternate route.      SWALLOW EVALUATION (Last 72 Hours)       SLP Adult Swallow Evaluation       Row Name 11/08/24 1500                   Rehab Evaluation    Document Type evaluation  -KA        Subjective Information no complaints  -KA        Patient Observations alert;cooperative  -KA        Patient Effort good  -KA        Symptoms Noted During/After Treatment none  -KA           General Information    Patient Profile Reviewed yes  -KA        Pertinent History Of Current Problem respiratory failure intubated 11/3 extubated 11/7, sepsis, hydronephrosis  -KA        Current Method of Nutrition NPO;nasogastric feedings  -KA        Precautions/Limitations, Vision WFL  -KA        Precautions/Limitations, Hearing WFL  -KA        Prior Level of Function-Swallowing no diet consistency restrictions  -KA        Plans/Goals Discussed with patient  -KA        Barriers to Rehab medically complex  -KA        Patient's Goals for Discharge return to PO diet  -KA        Family Goals for Discharge patient able to return to PO diet  -KA           Pain    Pretreatment Pain Rating 0/10 - no pain  -KA        Posttreatment Pain Rating 0/10 - no pain  -KA           Oral Motor Structure and Function    Dentition Assessment natural, present and adequate  -KA        Secretion Management WNL/WFL  -KA        Mucosal Quality moist, healthy  -KA           Oral Musculature and Cranial Nerve Assessment    Oral Motor General Assessment generalized oral motor weakness  -KA           General Eating/Swallowing Observations    Respiratory Support Currently in Use nasal cannula  -KA        Eating/Swallowing Skills self-fed;needed assist   -KA        Positioning During Eating upright in bed  -KA        Utensils Used spoon;cup;straw  -KA        Consistencies Trialed soft to chew textures;chopped;mixed consistency;pureed;thin liquids  -           Clinical Swallow Eval    Clinical Swallow Evaluation Summary Patient initially asleep and aroused for swallow evaluation and remained fatigued throughout. Patient demonstrated  no overt s/s of pen/asp with ice chips, thins via spoon and cup. Pt demonstrated delayed cough following thins via straw. Pt demonstrated x1 cough out of 4 spoonful bites of puree. Pt demonstrated cough on first trial of mechanical soft mixed and expectorated residue . Do not feel pt is ready or safe for PO and solids and discussed with pt, family and RN. Continue NPO and okay for ice chips and sips of water.  -           SLP Evaluation Clinical Impression    SLP Swallowing Diagnosis suspected pharyngeal dysphagia  -        Functional Impact risk of aspiration/pneumonia  -        Rehab Potential/Prognosis, Swallowing good, to achieve stated therapy goals  -        Swallow Criteria for Skilled Therapeutic Interventions Met demonstrates skilled criteria  -           Recommendations    Therapy Frequency (Swallow) PRN  -KA        Predicted Duration Therapy Intervention (Days) until discharge  -        SLP Diet Recommendation NPO;ice chips between meals after oral care, with supervision;water between meals after oral care, with supervision  -        Recommended Diagnostics reassess via clinical swallow evaluation  -        Oral Care Recommendations Oral Care BID/PRN  -KA        SLP Rec. for Method of Medication Administration meds via alternate route  -        Monitor for Signs of Aspiration yes;notify SLP if any concerns  -        Anticipated Discharge Disposition (SLP) anticipate therapy at next level of care  -                  User Key  (r) = Recorded By, (t) = Taken By, (c) = Cosigned By      Initials Name  Effective Dates    Tru Dean SLP 01/05/24 -                     EDUCATION  The patient has been educated in the following areas:   Dysphagia (Swallowing Impairment).                Time Calculation:    Time Calculation- SLP       Row Name 11/08/24 1548             Time Calculation- SLP    SLP Start Time 1430  -KA      SLP Received On 11/08/24  -KA         Untimed Charges    95387-CK Eval Oral Pharyng Swallow Minutes 60  -KA         Total Minutes    Untimed Charges Total Minutes 60  -KA       Total Minutes 60  -KA                User Key  (r) = Recorded By, (t) = Taken By, (c) = Cosigned By      Initials Name Provider Type    Tru Dean SLP Speech and Language Pathologist                    Therapy Charges for Today       Code Description Service Date Service Provider Modifiers Qty    37110941473 HC ST EVAL ORAL PHARYNG SWALLOW 4 11/8/2024 Tru Garcia SLP GN 1                 HASMUKH Calvin  11/8/2024

## 2024-11-08 NOTE — PROGRESS NOTES
PROGRESS NOTE      Patient Name: Kristyn Lugo  : 1940  MRN: [id number]  Primary Care Physician: Sean Fisher MD  Date of admission: 11/3/2024    Patient Care Team:  Sean Fisher MD as PCP - General (Internal Medicine)        Subjective   Subjective:     Patient now extubated on NC, no distress  sCr 1.27    Review of systems:  All ROS negative      Allergies:    Allergies   Allergen Reactions    Fenofibrate Unknown - High Severity     Pt is unaware    Penicillins Unknown - High Severity       Objective   Exam:     Vital Signs  Temp:  [98 °F (36.7 °C)-98.7 °F (37.1 °C)] 98.7 °F (37.1 °C)  Heart Rate:  [75-93] 89  Resp:  [18-20] 20  BP: (103-141)/(52-95) 141/68  SpO2:  [91 %-100 %] 93 %  on  Flow (L/min) (Oxygen Therapy):  [2-6] 2;   Device (Oxygen Therapy): humidified;nasal cannula  Body mass index is 33.73 kg/m².    General:  elderly  female intubated sedated.    Head:      Normocephalic and atraumatic.    Eyes:      PERRL/EOM intact, conjunctivae and sclerae clear without nystagmus.    Neck:      No masses, thyromegaly,  trachea central with normal respiratory effort   Lungs:    Clear bilaterally to auscultation.    Heart:      Regular rate and rhythm, no murmur no gallop  Abd:        Soft, nontender, not distended, bowel sounds positive, no shifting dullness   Pulses:   Pulses palpable  Extr:        No cyanosis or clubbing--mild edema.    Neuro:    sedated  Skin:       Intact without lesions or rashes.    Psych:    Alert and cooperative; normal mood and affect; .      Results Review:  I have personally reviewed most recent Data :  CBC    Results from last 7 days   Lab Units 24  0441 24  0450 24  0443 24  0322 24  1203 24  0526 24  1546   WBC 10*3/mm3 8.84 11.80* 19.97* 27.79* 35.25* 32.40* 17.23*   HEMOGLOBIN g/dL 9.4* 9.6* 9.9* 10.7* 11.7* 11.6* 10.9*   PLATELETS 10*3/mm3 93* 74* 84* 103* 156 182 158     CMP   Results from last 7 days   Lab  Units 11/08/24  0441 11/07/24  0450 11/06/24  0443 11/05/24  0322 11/04/24  1823 11/04/24  1203 11/04/24  0526 11/03/24  1546   SODIUM mmol/L 148* 142 143 140 144 139 141 140   POTASSIUM mmol/L 3.4* 3.1* 3.5 3.4* 3.7 3.8 2.9* 3.6   CHLORIDE mmol/L 111* 108* 111* 108* 109* 109* 109* 107   CO2 mmol/L 28.0 23.3 18.5* 18.0* 17.5* 15.5* 15.0* 19.9*   BUN mg/dL 51* 50* 42* 39* 39* 37* 36* 30*   CREATININE mg/dL 1.27* 1.50* 1.70* 1.99* 2.15* 2.54* 2.41* 2.17*   GLUCOSE mg/dL 143* 173* 124* 118* 128* 136* 126* 97   ALBUMIN g/dL  --   --   --   --   --  2.9*  --  3.1*   BILIRUBIN mg/dL  --   --   --   --   --  0.6  --  0.4   ALK PHOS U/L  --   --   --   --   --  102  --  53   AST (SGOT) U/L  --   --   --   --   --  42*  --  33*   ALT (SGPT) U/L  --   --   --   --   --  22  --  18     ABG    Results from last 7 days   Lab Units 11/07/24  0945 11/07/24  0333 11/06/24  1138 11/06/24  0440 11/05/24  0703 11/05/24  0334 11/04/24  1207   PH, ARTERIAL pH units 7.405 7.395 7.396 7.305* 7.364 7.322* 7.285*   PCO2, ARTERIAL mm Hg 42.9 38.9 34.1* 40.8 35.3 38.2 36.8   PO2 ART mm Hg 73.0* 80.1 59.7* 74.0* 83.9 80.1 107.3*   O2 SATURATION ART % 94.5 95.7 90.6* 93.2 96.0 94.8 97.5   BASE EXCESS ART mmol/L 1.9 -0.9* -3.5* -5.7* -4.7* -5.8* -8.5*     XR Chest 1 View    Result Date: 11/7/2024  Endotracheal tube with tip approximately 4.5 cm above the anaya. Left IJ central venous catheter with tip overlying the mid SVC. Enteric tube overlying the stomach and exiting field-of-view.  Increasing hazy and more dense opacities at the medial right lower lobe which are suspicious for infiltrate. No measurable pleural effusion or pneumothorax. Stable enlarged cardiac silhouette. No focal osseous abnormality.  This report was finalized on 11/7/2024 7:17 AM by Dr. Brett Olsen M.D on Workstation: [id number]       Results for orders placed during the hospital encounter of 11/03/24    Adult Transthoracic Echo Complete W/ Cont if Necessary Per  Protocol    Interpretation Summary    Left ventricular systolic function is normal. Calculated left ventricular EF = 52.4%    Left ventricular wall thickness is consistent with mild septal asymmetric hypertrophy.    Left ventricular diastolic function is consistent with age.    Mild aortic valve stenosis is present.    Peak velocity of the flow distal to the aortic valve is 251 cm/s. Aortic valve maximum pressure gradient is 25 mmHg. Aortic valve mean pressure gradient is 14 mmHg. Aortic valve dimensionless index is 0.6 .    Mild mitral valve stenosis is present. The mitral valve mean gradient is 5 mmHg.    Estimated right ventricular systolic pressure from tricuspid regurgitation is normal (<35 mmHg).    Scheduled Meds:aspirin, 81 mg, Nasogastric, Daily  cefTRIAXone, 2,000 mg, Intravenous, Q24H  chlorhexidine, 15 mL, Mouth/Throat, Q12H  folic acid, 400 mcg, Nasogastric, Daily  lansoprazole, 30 mg, Nasogastric, QAM AC  sodium chloride, 10 mL, Intravenous, Q12H  sodium chloride, 10 mL, Intravenous, Q12H  sodium chloride, 10 mL, Intravenous, Q12H  sodium chloride, 10 mL, Intravenous, Q12H  sodium chloride, 10 mL, Intravenous, Q12H      Continuous Infusions:     PRN Meds:  acetaminophen **OR** acetaminophen    ipratropium-albuterol    nitroglycerin    sodium chloride    sodium chloride    sodium chloride    sodium chloride    sodium chloride    Assessment & Plan   Assessment and Plan:         Acute pyelonephritis    ASSESSMENT:  Acute kidney injury  Chronic kidney disease, stage 3, with baseline creatinine roughly 1.0mg/dL; felt to be secondary to hypertension and advanced age  Metabolic acidosis  Hypokalemia  Right hydronephrosis with nephrolithiasis  Right distal ureteral calculi, s/p cysto with stent insertion 11/3/24  Sepsis  Acute hypoxemic respiratory failure  Chronic diastolic heart failure  Hypernatremia     PLAN :     Renal function better, UOP improvin.1L in last 24 hr   CXR  with more  opacities/infiltrates, no pleural effusion  Now off pressors  Follow volume status closely high sodium noted free water increased to 60ml/hr  EMMA most likely multifactorial and secondary to hemodynamic insults, sepsis and right hydronephrosis, most likely has ATN  Making urine. Urine studies c/w prerenal picture   Proteinuria 1.5 gm f/u with SPEP  Hypokalemia: will give Kcl IV x 4, Mag and Phos acceptable  Continue to follow-up with the volume status closely  Keep MAP>65  Antibiotics as per primary team. Currently on Rocephin  Will request strict I/Os, daily labs  Discussed in detail with RN  No urgent need for RRT but will follow closely    Note transcribed for Dr Valentín Laura. MD.  [facility] kidney consultant  [phone]  11/8/2024  13:27 EST

## 2024-11-08 NOTE — CASE MANAGEMENT/SOCIAL WORK
Continued Stay Note  [facility]     Patient Name: Kristyn Lugo  MRN: [id number]  Today's Date: 11/8/2024    Admit Date: 11/3/2024    Plan: Follow for PT/OT evals   Discharge Plan       Row Name 11/08/24 1242       Plan    Plan Follow for PT/OT evals    Plan Comments Patient extubated yesterday. Follow for PT/OT evals. Danielle CHEEK                   Discharge Codes    No documentation.                 Expected Discharge Date and Time       Expected Discharge Date Expected Discharge Time    Nov 11, 2024               HAM Shepard

## 2024-11-09 PROBLEM — A41.9 SHOCK, SEPTIC: Status: ACTIVE | Noted: 2024-11-09

## 2024-11-09 PROBLEM — J96.01 ACUTE RESPIRATORY FAILURE WITH HYPOXIA: Status: ACTIVE | Noted: 2024-11-09

## 2024-11-09 PROBLEM — I10 PRIMARY HYPERTENSION: Status: ACTIVE | Noted: 2024-11-09

## 2024-11-09 PROBLEM — I50.32 CHRONIC DIASTOLIC CHF (CONGESTIVE HEART FAILURE): Status: ACTIVE | Noted: 2024-11-09

## 2024-11-09 PROBLEM — E87.0 HYPERNATREMIA: Status: ACTIVE | Noted: 2024-11-09

## 2024-11-09 PROBLEM — I80.8 SUPERFICIAL THROMBOPHLEBITIS OF LEFT UPPER EXTREMITY: Status: ACTIVE | Noted: 2024-11-09

## 2024-11-09 PROBLEM — N13.30 HYDRONEPHROSIS OF RIGHT KIDNEY: Status: ACTIVE | Noted: 2024-11-09

## 2024-11-09 PROBLEM — E78.5 HLD (HYPERLIPIDEMIA): Status: ACTIVE | Noted: 2024-11-09

## 2024-11-09 PROBLEM — D69.6 THROMBOCYTOPENIA: Status: ACTIVE | Noted: 2024-11-09

## 2024-11-09 PROBLEM — B96.1 BACTEREMIA DUE TO KLEBSIELLA PNEUMONIAE: Status: ACTIVE | Noted: 2024-11-09

## 2024-11-09 PROBLEM — R78.81 BACTEREMIA DUE TO KLEBSIELLA PNEUMONIAE: Status: ACTIVE | Noted: 2024-11-09

## 2024-11-09 PROBLEM — N20.1 RIGHT URETERAL STONE: Status: ACTIVE | Noted: 2024-11-09

## 2024-11-09 PROBLEM — N18.31 STAGE 3A CHRONIC KIDNEY DISEASE: Status: ACTIVE | Noted: 2024-11-09

## 2024-11-09 PROBLEM — E87.6 HYPOKALEMIA: Status: ACTIVE | Noted: 2024-11-09

## 2024-11-09 PROBLEM — N17.9 AKI (ACUTE KIDNEY INJURY): Status: ACTIVE | Noted: 2024-11-09

## 2024-11-09 PROBLEM — R65.21 SHOCK, SEPTIC: Status: ACTIVE | Noted: 2024-11-09

## 2024-11-09 PROBLEM — K58.2 IRRITABLE BOWEL SYNDROME WITH BOTH CONSTIPATION AND DIARRHEA: Status: ACTIVE | Noted: 2024-11-09

## 2024-11-09 LAB
BASOPHILS # BLD AUTO: 0.04 10*3/MM3 (ref 0–0.2)
BASOPHILS NFR BLD AUTO: 0.4 % (ref 0–1.5)
DEPRECATED RDW RBC AUTO: 42.6 FL (ref 37–54)
EOSINOPHIL # BLD AUTO: 0.23 10*3/MM3 (ref 0–0.4)
EOSINOPHIL NFR BLD AUTO: 2.3 % (ref 0.3–6.2)
ERYTHROCYTE [DISTWIDTH] IN BLOOD BY AUTOMATED COUNT: 13.6 % (ref 12.3–15.4)
GLUCOSE BLDC GLUCOMTR-MCNC: 128 MG/DL (ref 70–130)
GLUCOSE BLDC GLUCOMTR-MCNC: 130 MG/DL (ref 70–130)
GLUCOSE BLDC GLUCOMTR-MCNC: 131 MG/DL (ref 70–130)
GLUCOSE BLDC GLUCOMTR-MCNC: 136 MG/DL (ref 70–130)
HCT VFR BLD AUTO: 30.1 % (ref 34–46.6)
HGB BLD-MCNC: 10.2 G/DL (ref 12–15.9)
IMM GRANULOCYTES # BLD AUTO: 0.22 10*3/MM3 (ref 0–0.05)
IMM GRANULOCYTES NFR BLD AUTO: 2.2 % (ref 0–0.5)
LYMPHOCYTES # BLD AUTO: 0.84 10*3/MM3 (ref 0.7–3.1)
LYMPHOCYTES NFR BLD AUTO: 8.3 % (ref 19.6–45.3)
MCH RBC QN AUTO: 29.7 PG (ref 26.6–33)
MCHC RBC AUTO-ENTMCNC: 33.9 G/DL (ref 31.5–35.7)
MCV RBC AUTO: 87.8 FL (ref 79–97)
MONOCYTES # BLD AUTO: 1.01 10*3/MM3 (ref 0.1–0.9)
MONOCYTES NFR BLD AUTO: 9.9 % (ref 5–12)
NEUTROPHILS NFR BLD AUTO: 7.82 10*3/MM3 (ref 1.7–7)
NEUTROPHILS NFR BLD AUTO: 76.9 % (ref 42.7–76)
NRBC BLD AUTO-RTO: 0 /100 WBC (ref 0–0.2)
PLATELET # BLD AUTO: 126 10*3/MM3 (ref 140–450)
PMV BLD AUTO: 12.4 FL (ref 6–12)
RBC # BLD AUTO: 3.43 10*6/MM3 (ref 3.77–5.28)
WBC NRBC COR # BLD AUTO: 10.16 10*3/MM3 (ref 3.4–10.8)

## 2024-11-09 PROCEDURE — 94799 UNLISTED PULMONARY SVC/PX: CPT

## 2024-11-09 PROCEDURE — 25010000002 CEFTRIAXONE PER 250 MG: Performed by: INTERNAL MEDICINE

## 2024-11-09 PROCEDURE — 97530 THERAPEUTIC ACTIVITIES: CPT

## 2024-11-09 PROCEDURE — 82948 REAGENT STRIP/BLOOD GLUCOSE: CPT

## 2024-11-09 PROCEDURE — 97162 PT EVAL MOD COMPLEX 30 MIN: CPT

## 2024-11-09 PROCEDURE — 25010000002 ONDANSETRON PER 1 MG

## 2024-11-09 PROCEDURE — 97166 OT EVAL MOD COMPLEX 45 MIN: CPT

## 2024-11-09 PROCEDURE — 99223 1ST HOSP IP/OBS HIGH 75: CPT | Performed by: INTERNAL MEDICINE

## 2024-11-09 PROCEDURE — 85025 COMPLETE CBC W/AUTO DIFF WBC: CPT | Performed by: INTERNAL MEDICINE

## 2024-11-09 PROCEDURE — 92526 ORAL FUNCTION THERAPY: CPT

## 2024-11-09 RX ORDER — IPRATROPIUM BROMIDE AND ALBUTEROL SULFATE 2.5; .5 MG/3ML; MG/3ML
3 SOLUTION RESPIRATORY (INHALATION) 2 TIMES DAILY
Status: DISCONTINUED | OUTPATIENT
Start: 2024-11-09 | End: 2024-11-14

## 2024-11-09 RX ORDER — POTASSIUM CHLORIDE 1.5 G/1.58G
40 POWDER, FOR SOLUTION ORAL ONCE
Status: COMPLETED | OUTPATIENT
Start: 2024-11-09 | End: 2024-11-09

## 2024-11-09 RX ORDER — ONDANSETRON 4 MG/1
4 TABLET, ORALLY DISINTEGRATING ORAL EVERY 6 HOURS PRN
Status: DISCONTINUED | OUTPATIENT
Start: 2024-11-09 | End: 2024-11-09

## 2024-11-09 RX ORDER — HYDROCODONE BITARTRATE AND ACETAMINOPHEN 5; 325 MG/1; MG/1
1 TABLET ORAL ONCE AS NEEDED
Status: COMPLETED | OUTPATIENT
Start: 2024-11-09 | End: 2024-11-11

## 2024-11-09 RX ORDER — ONDANSETRON HYDROCHLORIDE 4 MG/5ML
4 SOLUTION ORAL EVERY 6 HOURS PRN
Status: DISCONTINUED | OUTPATIENT
Start: 2024-11-09 | End: 2024-11-09 | Stop reason: CLARIF

## 2024-11-09 RX ORDER — ONDANSETRON 2 MG/ML
4 INJECTION INTRAMUSCULAR; INTRAVENOUS EVERY 6 HOURS PRN
Status: DISCONTINUED | OUTPATIENT
Start: 2024-11-09 | End: 2024-11-15 | Stop reason: HOSPADM

## 2024-11-09 RX ADMIN — LANSOPRAZOLE 30 MG: 15 TABLET, ORALLY DISINTEGRATING ORAL at 08:24

## 2024-11-09 RX ADMIN — ACETAMINOPHEN 325MG 650 MG: 325 TABLET ORAL at 14:56

## 2024-11-09 RX ADMIN — Medication 10 ML: at 09:00

## 2024-11-09 RX ADMIN — POTASSIUM CHLORIDE 40 MEQ: 1.5 FOR SOLUTION ORAL at 11:36

## 2024-11-09 RX ADMIN — ASPIRIN 81 MG: 81 TABLET, CHEWABLE ORAL at 08:24

## 2024-11-09 RX ADMIN — ACETAMINOPHEN 325MG 650 MG: 325 TABLET ORAL at 08:23

## 2024-11-09 RX ADMIN — TRAMADOL HYDROCHLORIDE 50 MG: 50 TABLET, FILM COATED ORAL at 12:21

## 2024-11-09 RX ADMIN — CEFTRIAXONE 2000 MG: 2 INJECTION, POWDER, FOR SOLUTION INTRAMUSCULAR; INTRAVENOUS at 17:01

## 2024-11-09 RX ADMIN — TRAMADOL HYDROCHLORIDE 50 MG: 50 TABLET, FILM COATED ORAL at 19:00

## 2024-11-09 RX ADMIN — ONDANSETRON 4 MG: 2 INJECTION, SOLUTION INTRAMUSCULAR; INTRAVENOUS at 05:36

## 2024-11-09 RX ADMIN — CHLORHEXIDINE GLUCONATE 15 ML: 1.2 RINSE ORAL at 08:23

## 2024-11-09 RX ADMIN — IPRATROPIUM BROMIDE AND ALBUTEROL SULFATE 3 ML: 2.5; .5 SOLUTION RESPIRATORY (INHALATION) at 20:40

## 2024-11-09 RX ADMIN — Medication 10 ML: at 22:31

## 2024-11-09 RX ADMIN — Medication 400 MCG: at 08:24

## 2024-11-09 NOTE — THERAPY EVALUATION
Acute Care - Speech Language Pathology   Swallow Re-Evaluation [facility]     Patient Name: Kristyn Lugo  : 1940  MRN: [id number]  Today's Date: 2024               Admit Date: 11/3/2024    Visit Dx:     ICD-10-CM ICD-9-CM   1. Septic shock  A41.9 038.9    R65.21 785.52     995.92   2. Acute kidney injury  N17.9 584.9   3. Occult GI bleeding  R19.5 792.1   4. Acute respiratory failure with hypoxia  J96.01 518.81   5. Acute pyelonephritis  N10 590.10   6. Kidney stone on right side, distal UVJ with hydronephrosis  N20.0 592.0     Patient Active Problem List   Diagnosis    Lumbar spinal stenosis    Spondylolisthesis of lumbar region    Acute pyelonephritis     Past Medical History:   Diagnosis Date    Aortic valve regurgitation     Chronic kidney disease     Low back pain      Past Surgical History:   Procedure Laterality Date    CYSTOSCOPY W/ URETERAL STENT PLACEMENT Right 11/3/2024    Procedure: CYSTOSCOPY URETERAL CATHETER/STENT INSERTION;  Surgeon: Tony Ledesma MD;  Location: [facility] OR;  Service: Urology;  Laterality: Right;       SLP Recommendation and Plan  SLP Swallowing Diagnosis: mild, oral dysphagia, suspected pharyngeal dysphagia (24)  SLP Diet Recommendation: puree, thin liquids (24)  Recommended Precautions and Strategies: upright posture during/after eating, small bites of food and sips of liquid, general aspiration precautions, reflux precautions, assist with feeding (24)  SLP Rec. for Method of Medication Administration: meds crushed, with puree (24)     Monitor for Signs of Aspiration: yes, notify SLP if any concerns (24)  Recommended Diagnostics: reassess via clinical swallow evaluation (24)  Swallow Criteria for Skilled Therapeutic Interventions Met: demonstrates skilled criteria (24)  Anticipated Discharge Disposition (SLP): anticipate therapy at next level of care (24  0900)  Rehab Potential/Prognosis, Swallowing: good, to achieve stated therapy goals (11/09/24 0900)  Therapy Frequency (Swallow): PRN (11/09/24 0900)  Predicted Duration Therapy Intervention (Days): until discharge (11/09/24 0900)  Oral Care Recommendations: Oral Care BID/PRN (11/09/24 0900)                        Treatment Assessment (SLP): improved (11/09/24 0900)               Outcome Evaluation: Clinical swallow evaluation  completed. Daughter present.    Dysphagia signs: Pt unable to self feed due to UE edema and required assistance.   Thin liquid trials  via cup and straw an puree completed without difficulty. Pt ind self limiting bolus size for safe intake. With soft solids, pt with significantly prolonged mastication with moderately impaired bolus formation with all trials and pt consistently reporting bolus would not go down. Mild oral residue post swallow that was cleared with thin liquid via straw wash.             Assessment / Plan: Recommend initiate diet of puree solids with thin liquids via straw. Recommend medication crushed in puree with thin liquids as tolerated. Oral care BID/PRN.      SWALLOW EVALUATION (Last 72 Hours)       SLP Adult Swallow Evaluation       Row Name 11/09/24 0900 11/08/24 1500       Rehab Evaluation    Document Type re-evaluation  -HT evaluation  -KA    Subjective Information pain  -HT no complaints  -KA    Patient Observations cooperative;agree to therapy;alert  -HT alert;cooperative  -KA    Patient Effort good  -HT good  -KA    Symptoms Noted During/After Treatment -- none  -KA       General Information    Patient Profile Reviewed yes  -HT yes  -KA    Pertinent History Of Current Problem respiratory failure intubated 11/3 extubated 11/7, sepsis, hydronephrosis. Initial eval 11/8 completed with recommendation for NPO.  -HT respiratory failure intubated 11/3 extubated 11/7, sepsis, hydronephrosis  -KA    Current Method of Nutrition NPO;nasogastric feedings  -HT NPO;nasogastric  feedings  -KA    Precautions/Limitations, Vision WFL  -HT WFL  -KA    Precautions/Limitations, Hearing WFL  -HT WFL  -KA    Prior Level of Function-Communication WFL  -HT --    Prior Level of Function-Swallowing NPO  -HT no diet consistency restrictions  -KA    Plans/Goals Discussed with patient and family;agreed upon  - patient  -KA    Barriers to Rehab medically complex  -HT medically complex  -KA    Patient's Goals for Discharge return home  -HT return to PO diet  -KA    Family Goals for Discharge -- patient able to return to PO diet  -KA       Pain    Pretreatment Pain Rating 7/10  -HT 0/10 - no pain  -KA    Posttreatment Pain Rating 7/10  -HT 0/10 - no pain  -KA    Pain Location back;buttock  -HT --    Pain Management Interventions nursing notified  -HT --    Pre/Posttreatment Pain Comment Pt attempting to have bowel movement  -HT --       Oral Motor Structure and Function    Dentition Assessment missing teeth  missing some molars  -HT natural, present and adequate  -KA    Secretion Management WNL/WFL  -HT WNL/WFL  -KA    Mucosal Quality moist, healthy  -HT moist, healthy  -KA    Volitional Swallow delayed  -HT --    Volitional Cough weak  -HT --       Oral Musculature and Cranial Nerve Assessment    Oral Motor General Assessment generalized oral motor weakness  -HT generalized oral motor weakness  -KA    Vocal Impairment, Detail. Cranial Nerve X (Vagus) other (see comments)  hoarse voice  -HT --    Oral Motor, Comment missing molars  -HT --       General Eating/Swallowing Observations    Respiratory Support Currently in Use room air  -HT nasal cannula  -KA    Eating/Swallowing Skills needed assist;fed by SLP  -HT self-fed;needed assist  -KA    Positioning During Eating upright in bed  -HT upright in bed  -KA    Utensils Used straw;spoon;cup  -HT spoon;cup;straw  -KA    Consistencies Trialed thin liquids;ice chips;pureed;mixed consistency;soft to chew textures;regular textures  - soft to chew  textures;chopped;mixed consistency;pureed;thin liquids  -KA       Clinical Swallow Eval    Clinical Swallow Evaluation Summary Clinical swallow evaluation  completed. Daughter present.       Cognition: alert and oriented x3 with intermittent confusion.        Expressive communication: adequate.       Voice: Vocal quality mildly hoarse.      Cough:  Weak.         Speech: within functional limits.           Patient agreeable to PO trials.       Oral mechanism examination: WFL, mildly reduced lingual range of motion. Adequate dentition with some missing molars.     Dysphagia signs: Pt unable to self feed due to UE edema and required assistance.Thin liquid trials  via cup and straw an puree completed without difficulty. Pt ind self limiting bolus size for safe intake. With soft solids, pt with significantly prolonged mastication with moderately impaired bolus formation with all trials and pt consistently reporting bolus would not go down. Mild oral residue post swallow that was cleared with thin liquid via straw wash.               Assessment / Plan: Recommend initiate diet of puree solids with thin liquids via straw. Recommend medication crushed in puree with thin liquids as tolerated. Oral care BID/PRN.  -HT Patient initially asleep and aroused for swallow evaluation and remained fatigued throughout. Patient demonstrated  no overt s/s of pen/asp with ice chips, thins via spoon and cup. Pt demonstrated delayed cough following thins via straw. Pt demonstrated x1 cough out of 4 spoonful bites of puree. Pt demonstrated cough on first trial of mechanical soft mixed and expectorated residue . Do not feel pt is ready or safe for PO and solids and discussed with pt, family and RN. Continue NPO and okay for ice chips and sips of water.  -KA       SLP Evaluation Clinical Impression    SLP Swallowing Diagnosis mild;oral dysphagia;suspected pharyngeal dysphagia  -HT suspected pharyngeal dysphagia  -KA    Functional Impact risk of  aspiration/pneumonia  -HT risk of aspiration/pneumonia  -KA    Rehab Potential/Prognosis, Swallowing good, to achieve stated therapy goals  -HT good, to achieve stated therapy goals  -KA    Swallow Criteria for Skilled Therapeutic Interventions Met demonstrates skilled criteria  -HT demonstrates skilled criteria  -KA       SLP Treatment Clinical Impressions    Treatment Assessment (SLP) improved  -HT --    Care Plan Review evaluation/treatment results reviewed  -HT --    Care Plan Review, Other Participant(s) daughter  -HT --       Recommendations    Therapy Frequency (Swallow) PRN  -HT PRN  -KA    Predicted Duration Therapy Intervention (Days) until discharge  -HT until discharge  -KA    SLP Diet Recommendation puree;thin liquids  -HT NPO;ice chips between meals after oral care, with supervision;water between meals after oral care, with supervision  -KA    Recommended Diagnostics reassess via clinical swallow evaluation  - reassess via clinical swallow evaluation  -KA    Recommended Precautions and Strategies upright posture during/after eating;small bites of food and sips of liquid;general aspiration precautions;reflux precautions;assist with feeding  -HT --    Oral Care Recommendations Oral Care BID/PRN  -HT Oral Care BID/PRN  -KA    SLP Rec. for Method of Medication Administration meds crushed;with puree  -HT meds via alternate route  -    Monitor for Signs of Aspiration yes;notify SLP if any concerns  -HT yes;notify SLP if any concerns  -KA    Anticipated Discharge Disposition (SLP) anticipate therapy at next level of care  - anticipate therapy at next level of care  -KA       Swallow Goals (SLP)    Swallow LTGs Swallow Long Term Goal (free text)  -HT --       (LTG) Swallow    (LTG) Swallow Pt will tolerate safest and least restrictive diet  - --    Jack (Swallow Long Term Goal) independently (over 90% accuracy)  -HT --    Time Frame (Swallow Long Term Goal) by discharge  - --               User Key  (r) = Recorded By, (t) = Taken By, (c) = Cosigned By      Initials Name Effective Dates    KA Tru Garcia SLP 01/05/24 -     HT Aixa Lama SLP 09/14/23 -                     EDUCATION  The patient has been educated in the following areas:   Dysphagia (Swallowing Impairment).        SLP GOALS       Row Name 11/09/24 0900             (LTG) Swallow    (LTG) Swallow Pt will tolerate safest and least restrictive diet  -HT      [place] (Swallow Long Term Goal) independently (over 90% accuracy)  -HT      Time Frame (Swallow Long Term Goal) by discharge  -HT                User Key  (r) = Recorded By, (t) = Taken By, (c) = Cosigned By      Initials Name Provider Type    Aixa Lay SLP Speech and Language Pathologist                         Time Calculation:    Time Calculation- SLP       Row Name 11/09/24 1325             Time Calculation- SLP    SLP Start Time 1100  -HT      SLP Received On 11/09/24  -HT         Untimed Charges    13784-DH Treatment Swallow Minutes 60  -HT         Total Minutes    Untimed Charges Total Minutes 60  -HT       Total Minutes 60  -HT                User Key  (r) = Recorded By, (t) = Taken By, (c) = Cosigned By      Initials Name Provider Type    Aixa Lay SLP Speech and Language Pathologist                    Therapy Charges for Today       Code Description Service Date Service Provider Modifiers Qty    [id number] HC ST TREATMENT SWALLOW 4 11/9/2024 Aixa Lama SLP GN 1                 HASMUKH Crowley  11/9/2024

## 2024-11-09 NOTE — CONSULTS
Internal Medicine Consult  INTERNAL MEDICINE   [facility]       Patient Identification:  Name: Kristyn Lugo  Age: 84 y.o.  Sex: female  :  1940  MRN: [id number]                   Primary Care Physician: Sean Fisher MD                               Requesting physician: Dr. Kenny Gerber  Date of consultation: 2024    Reason for consultation: Continued medical management out of ICU    History of Present Illness:   Patient is a 84-year-old female with complex past medical history consisting of chronic diarrhea alternating with constipation's, chronic diastolic congestive heart failure and a kidney nodule presented to the hospital on 11/3/2024 with progressive diarrhea generalized malaise fatigue and not feeling very well.  In the emergency room she was found to be hypertensive and workup for abdominal discomfort and diarrhea showed evidence of acute kidney injury along with ureteric obstruction of the right kidney with hydronephrosis.  Patient was febrile and septic and underwent emergent cystoscopy and stenting of the right ureteric obstruction and after blood cultures were drawn patient was started on broad-spectrum antibiotic therapy.  Postoperatively patient was intubated and was placed on pressors for septic shock and was admitted to ICU and was seen by nephrology and urology and cardiology service.  Blood cultures drawn at the time of admission come back positive for Klebsiella species sensitive to ceftriaxone.  Patient renal function is getting better and white blood cell count is improved and she is off pressors.  Nasogastric/top of feeding tube was inserted while she was intubated.  She was eventually extubated on 2024 and has been gradually weaned off oxygen supplementation.  Patient claims of not feeling very well but unable to describe what exactly makes her feel awful.  She is tolerating tube feed.  She denies any localizing pain and discomfort.  She was  noted to have left arm swelling for which ultrasound was performed and she was noted to have acute left upper extremity superficial thrombophlebitis in the cephalic and medial cubital vein.  Swallow studies have been ordered.  Patient is considered medically stable in terms of improving renal function, resolution of leukocytosis and maintaining blood pressure off pressors and not extubated to be considered stable and improved to be moved out of ICU to the telemetry unit and internal medicine service is consulted.      Past Medical History:  Past Medical History:   Diagnosis Date    Aortic valve regurgitation     Chronic kidney disease     Low back pain      Past Surgical History:  Past Surgical History:   Procedure Laterality Date    CYSTOSCOPY W/ URETERAL STENT PLACEMENT Right 11/3/2024    Procedure: CYSTOSCOPY URETERAL CATHETER/STENT INSERTION;  Surgeon: Tony Ledesma MD;  Location: [facility];  Service: Urology;  Laterality: Right;      Home Meds:  Medications Prior to Admission   Medication Sig Dispense Refill Last Dose/Taking    acetaminophen (TYLENOL) 500 MG tablet Take 1 tablet by mouth Every 6 (Six) Hours As Needed for Mild Pain, Fever or Headache.   Patient Taking Differently    aspirin 81 MG EC tablet Take 1 tablet by mouth Daily.   11/2/2024 Morning    cholecalciferol (Vitamin D, Cholecalciferol,) 25 MCG (1000 UT) tablet Take 1 tablet by mouth Daily.   11/2/2024    Docusate Calcium (STOOL SOFTENER PO) Take 1 tablet by mouth Daily.   11/2/2024    folic acid (FOLVITE) 400 MCG tablet Take 1 tablet by mouth Daily.   11/2/2024 Morning    metoprolol-hydrochlorothiazide (LOPRESSOR HCT) 100-25 MG per tablet Take 1 tablet by mouth Daily.   11/2/2024 Morning    multivitamin with minerals (CENTRUM SILVER PO) Take 1 tablet by mouth Daily.   11/2/2024    naproxen sodium (ALEVE) 220 MG tablet Take 1 tablet by mouth Daily As Needed.   11/3/2024    Omega-3 Fatty Acids (fish oil) 1200 MG capsule capsule Take 1  capsule by mouth Daily With Breakfast.   11/2/2024    simvastatin (ZOCOR) 40 MG tablet Take 1 tablet by mouth Daily.   11/2/2024 Evening     Current Meds:     Current Facility-Administered Medications:     acetaminophen (TYLENOL) tablet 650 mg, 650 mg, Oral, Q4H PRN, 650 mg at 11/08/24 1800 **OR** acetaminophen (TYLENOL) suppository 325 mg, 325 mg, Rectal, Q4H PRN, Juan Antonio Lee MD, 325 mg at 11/04/24 0331    aspirin chewable tablet 81 mg, 81 mg, Nasogastric, Daily, Juan Antonio Lee MD, 81 mg at 11/08/24 0820    cefTRIAXone (ROCEPHIN) 2,000 mg in sodium chloride 0.9 % 100 mL MBP, 2,000 mg, Intravenous, Q24H, Neymar Gerber MD, Last Rate: 200 mL/hr at 11/08/24 1800, 2,000 mg at 11/08/24 1800    chlorhexidine (PERIDEX) 0.12 % solution 15 mL, 15 mL, Mouth/Throat, Q12H, Joyce Jauregui APRN, 15 mL at 11/08/24 0830    folic acid (FOLVITE) tablet 400 mcg, 400 mcg, Nasogastric, Daily, Juan Antonio Lee MD, 400 mcg at 11/08/24 0820    ipratropium-albuterol (DUO-NEB) nebulizer solution 3 mL, 3 mL, Nebulization, Q6H PRN, Kenny Gerber MD, 3 mL at 11/08/24 1230    lansoprazole (PREVACID SOLUTAB) disintegrating tablet Tablet Delayed Release Dispersible 30 mg, 30 mg, Nasogastric, QAM AC, Joyce Jauregui APRN, 30 mg at 11/08/24 0820    nitroglycerin (NITROSTAT) SL tablet 0.4 mg, 0.4 mg, Sublingual, Q5 Min PRN, Juan Antonio Lee MD    sodium chloride 0.9 % flush 10 mL, 10 mL, Intravenous, Q12H, Juan Antonio Lee MD, 10 mL at 11/08/24 0830    sodium chloride 0.9 % flush 10 mL, 10 mL, Intravenous, PRN, Juan Antonio Lee MD    sodium chloride 0.9 % flush 10 mL, 10 mL, Intravenous, Q12H, Juan Antonio Lee MD, 10 mL at 11/08/24 0830    sodium chloride 0.9 % flush 10 mL, 10 mL, Intravenous, Q12H, Juan Antonio Lee MD, 10 mL at 11/08/24 0827    sodium chloride 0.9 % flush 10 mL, 10 mL, Intravenous, Q12H, Juan Antonio Lee MD, 10 mL at 11/08/24 0827    sodium chloride 0.9 % flush 10 mL, 10 mL, Intravenous, Q12H, Juan Antonio Lee MD, 10 mL  "at 11/08/24 0827    sodium chloride 0.9 % flush 10 mL, 10 mL, Intravenous, Jesus LAGUERRE Lebnan S, MD    sodium chloride 0.9 % flush 20 mL, 20 mL, Intravenous, Jesus LAGUERRE Lebnan S, MD    sodium chloride 0.9 % infusion 40 mL, 40 mL, Intravenous, Jesus LAGUERRE Lebnan S, MD    sodium chloride 0.9 % infusion 40 mL, 40 mL, Intravenous, Jesus LAGUERRE Lebnan S, MD  Allergies:  Allergies   Allergen Reactions    Fenofibrate Unknown - High Severity     Pt is unaware    Penicillins Unknown - High Severity     Social History:   Social History     Tobacco Use    Smoking status: Former     Types: Cigarettes    Smokeless tobacco: Never   Substance Use Topics    Alcohol use: Not on file      Family History:  History reviewed. No pertinent family history.       Review of Systems  See history of present illness and past medical history as described in history presenting illness      Vitals:   /79   Pulse 91   Temp 98.2 °F (36.8 °C) (Oral)   Resp 20   Ht 154 cm (60.63\")   Wt 80 kg (176 lb 5.9 oz)   SpO2 93%   BMI 33.73 kg/m²   I/O:   Intake/Output Summary (Last 24 hours) at 11/8/2024 1930  Last data filed at 11/8/2024 1900  Gross per 24 hour   Intake 401 ml   Output 925 ml   Net -524 ml     Exam:  Patient is examined using the personal protective equipment as per guidelines from infection control for this particular patient as enacted.  Hand washing was performed before and after patient interaction.  General Appearance:  Chronically ill female who has nasogastric tube in place and claims that she does not feel good but according to the family member at the bedside appears improved compared to how she was not coming to the hospital.   Head:    Normocephalic, without obvious abnormality, atraumatic   Eyes:  Pale conjunctiva   Ears:    Normal external ear canals, both ears   Nose: Nasogastric feeding tube in place   Throat:   Lips, tongue, gums normal; oral mucosa pink and moist   Neck: Supple and no adenopathy   Back:   No mid " spine tenderness noted   Lungs:   Decreased breath sounds at the bases   Chest Wall:    No tenderness or deformity    Heart:  S1-S2 regular 2/6 systolic murmur noted   Abdomen:   Obese soft nontender   Extremities: Bilateral lower extremity edema, left arm erythema and swelling including antecubital fossa swelling at the previous IV site.   Pulses:   Pulses palpable in all extremities; symmetric all extremities   Skin: Bruising and ecchymotic changes noted but no rash.   Neurologic: Awake and oriented.       Data Review:      I reviewed the patient's new clinical results.  Results from last 7 days   Lab Units 11/08/24  0441 11/07/24  0450 11/06/24  0443 11/05/24  0322 11/04/24  1203 11/04/24  0526 11/03/24  1546   WBC 10*3/mm3 8.84 11.80* 19.97* 27.79* 35.25* 32.40* 17.23*   HEMOGLOBIN g/dL 9.4* 9.6* 9.9* 10.7* 11.7* 11.6* 10.9*   PLATELETS 10*3/mm3 93* 74* 84* 103* 156 182 158     Results from last 7 days   Lab Units 11/08/24  0441 11/07/24  0450 11/06/24  0443 11/05/24  0322 11/04/24  1823 11/04/24  1203 11/04/24  0526   SODIUM mmol/L 148* 142 143 140 144 139 141   POTASSIUM mmol/L 3.4* 3.1* 3.5 3.4* 3.7 3.8 2.9*   CHLORIDE mmol/L 111* 108* 111* 108* 109* 109* 109*   CO2 mmol/L 28.0 23.3 18.5* 18.0* 17.5* 15.5* 15.0*   BUN mg/dL 51* 50* 42* 39* 39* 37* 36*   CREATININE mg/dL 1.27* 1.50* 1.70* 1.99* 2.15* 2.54* 2.41*   CALCIUM mg/dL 8.1* 8.3* 7.3* 7.1* 7.6* 7.6* 7.9*   GLUCOSE mg/dL 143* 173* 124* 118* 128* 136* 126*     Microbiology Results (last 10 days)       Procedure Component Value - Date/Time    Blood Culture - Blood, Arm, Right [[id number]]  (Normal) Collected: 11/03/24 1805    Lab Status: Final result Specimen: Blood from Arm, Right Updated: 11/08/24 1815     Blood Culture No growth at 5 days    Narrative:      Less than seven (7) mL's of blood was collected.  Insufficient quantity may yield false negative results.    Blood Culture - Blood, Arm, Right [[id number]]  (Abnormal)  (Susceptibility) Collected:  11/03/24 1644    Lab Status: Final result Specimen: Blood from Arm, Right Updated: 11/06/24 0607     Blood Culture Klebsiella pneumoniae ssp pneumoniae     Isolated from Aerobic and Anaerobic Bottles     Gram Stain Anaerobic Bottle Gram negative bacilli      Aerobic Bottle Gram negative bacilli    Narrative:      Less than seven (7) mL's of blood was collected.  Insufficient quantity may yield false negative results.    Susceptibility        Klebsiella pneumoniae ssp pneumoniae      MARCIANO      Amoxicillin + Clavulanate Susceptible      Ampicillin Resistant      Ampicillin + Sulbactam Susceptible      Cefepime Susceptible      Ceftazidime Susceptible      Ceftriaxone Susceptible      Gentamicin Susceptible      Levofloxacin Susceptible      Piperacillin + Tazobactam Susceptible      Trimethoprim + Sulfamethoxazole Susceptible                       Susceptibility Comments       Klebsiella pneumoniae ssp pneumoniae    Cefazolin sensitivity will not be reported for Enterobacteriaceae in non-urine isolates. If cefazolin is preferred, please call the microbiology lab to request an E-test.  With the exception of urinary-sourced infections, aminoglycosides should not be used as monotherapy.               Blood Culture ID, PCR - Blood, Arm, Right [[id number]]  (Abnormal) Collected: 11/03/24 1644    Lab Status: Final result Specimen: Blood from Arm, Right Updated: 11/04/24 0619     BCID, PCR Klebsiella pneumoniae group. Identification by BCID2 PCR.     BOTTLE TYPE Anaerobic Bottle    Narrative:      No resistance genes detected.          Brief Urine Lab Results  (Last result in the past 365 days)        Color   Clarity   Blood   Leuk Est   Nitrite   Protein   CREAT   Urine HCG        11/04/24 1203             80.8             XR Chest 1 View    Result Date: 11/7/2024  Endotracheal tube with tip approximately 4.5 cm above the anaya. Left IJ central venous catheter with tip overlying the mid SVC. Enteric tube overlying the  stomach and exiting field-of-view.  Increasing hazy and more dense opacities at the medial right lower lobe which are suspicious for infiltrate. No measurable pleural effusion or pneumothorax. Stable enlarged cardiac silhouette. No focal osseous abnormality.  This report was finalized on 11/7/2024 7:17 AM by Dr. Brett Olsen M.D on Workstation: [id number]      XR Chest 1 View    Result Date: 11/6/2024  1. Mild cardiomegaly. 2. Underinflation of the lungs with mild increased density in the lung bases as described. 3. Satisfactory position of life support devices. 4. No significant pneumothorax is seen.  This report was finalized on 11/6/2024 7:08 AM by Dr. Vishal Pereyra M.D on Workstation: [id number]      XR Chest 1 View    Result Date: 11/4/2024   1. Interval removal of NG tube and left IJ catheter. 2. Subtle lucency along the right hemidiaphragm, indeterminate. Questionable for free intraperitoneal air. Clinical correlation with left side down decubitus abdominal radiograph as clinically indicated 3. Cardiomegaly with vascular congestion. 4. Possible airways disease. 5. Heterogeneous basilar opacities  This report was finalized on 11/4/2024 8:37 AM by Dr. Emmanuel Miranda M.D on Workstation: [id number]      XR Chest 1 View    Result Date: 11/4/2024   1. Left IJ catheter tip appears sidewalled against the SVC. No pneumothorax. 2. Interval placement of NG tube. 3. Ill-defined left lung base opacity, stable  This report was finalized on 11/4/2024 8:03 AM by Dr. Emmanuel Miranda M.D on Workstation: [id number]      XR Chest 1 View    Result Date: 11/3/2024  Electronically signed by Bulmaro Abraham MD on 11-03-24 at 2259    XR Chest 1 View    Result Date: 11/3/2024  No focal pulmonary consolidation. Cardiomegaly. Follow-up as clinical indications persist.  This report was finalized on 11/3/2024 3:33 PM by Dr. Jose Hardy M.D on Workstation: [id number]         Assessment:  Active Hospital Problems    Diagnosis   POA    **Acute pyelonephritis [N10]  Yes   Klebsiella pneumonia bacteremia due to calculus pyelonephritis  Status post hypoxemic/metabolic respiratory failure due to septic shock status post extubation on 11/7/2024  Calculus pyelonephritis with right ureteric obstruction status post cystoscopy stenting and stent placement on 11/3/2024  Anemia and thrombocytopenia multifactorial  Acute kidney injury-clinically improving  Postintubation dysphagia with risk of aspiration-on Dobbhoff tube feeding.  History of aortic aneurysm  History of diastolic dysfunction with left ventricular ejection fraction October 2024 noted to be 60 to 65%  Moderate to severe aortic insufficiency  Elevated troponin during hospitalization in the setting of septic shock pyelonephritis and acute kidney injury considered to be type II non-ST segment elevation MI per cardiology.  Left arm phlebitis due to peripheral IV-continue with warm compresses and elevation.      Medical decision making/care plan:  Continue with IV antibiotics, monitor hemoglobin and platelet count, continue with Dobbhoff feeding tube while awaiting formal swallow evaluation to decide how to advance diet and continue with aspiration precaution because of concerns for intubation induced dysphagia and risk for aspiration  Monitor renal function and avoid nephrotoxic agent and hypotensive episode  OT PT evaluation to deal with critical illness deconditioning  Continue follow-up with nephrology and urology service.  Continue with Booth catheter and consider discontinuing it once activity level and ambulation is improved.  [facility] will assume care.  Satya Bundy MD   11/8/2024  19:30 EST    Parts of this note may be an electronic transcription/translation of spoken language to printed text using the Dragon dictation system.

## 2024-11-09 NOTE — CONSULTS
"Referring Provider: Dr Wills    Reason for Consultation: Klebsiella bacteremia with ureteral stent     History of present illness:  Kristyn Lugo is a 84 y.o. who I am asked to evaluate and give opinion for \"Klebsiella bacteremia with ureteral stent.\" History is obtained from the patient who is slightly confused, her daughter, and review of the old medical records which I summarize/synthesize as follows: She presented to the emergency room on 11/3 with nausea/vomiting/diarrhea that had been going on for few days.  She checked her BP and it was found to be low she had associated dizziness.  There were no alleviating factors to her symptoms.  She was found to be hypotensive with septic shock so required admission to the intensive care unit.  She required intubation.  Labs were notable for WBC 17, procalcitonin 93, lactate 4.9, and creatinine 2.1.  CT of the abdomen pelvis showed a 5 mm right sided obstructing ureteral stone with associated hydronephrosis.  She went to the operating room with urology on 11/3 for cystoscopy with ureteral stent placement.  Her blood cultures ended up growing Klebsiella pneumoniae.  She has been on ceftriaxone since 11/3.  As of today she will be receiving dose #7.  She is afebrile, hemodynamically stable, and her WBC has normalized.  She is looking forward to working with PT and getting out of bed.    Past Medical History:   Diagnosis Date    Aortic valve regurgitation     Chronic kidney disease     Low back pain        Past Surgical History:   Procedure Laterality Date    CYSTOSCOPY W/ URETERAL STENT PLACEMENT Right 11/3/2024    Procedure: CYSTOSCOPY URETERAL CATHETER/STENT INSERTION;  Surgeon: Tony Ledesma MD;  Location: [facility];  Service: Urology;  Laterality: Right;       Social History:  Lives in [place], [place]  Retired    Antibiotic allergies and intolerances:    Penicillins caused a rash in the 1960s; she says she has tolerated amoxicillin or " Augmentin since that time.    Medications:    Current Facility-Administered Medications:     acetaminophen (TYLENOL) tablet 650 mg, 650 mg, Oral, Q4H PRN, 650 mg at 11/08/24 1800 **OR** acetaminophen (TYLENOL) suppository 325 mg, 325 mg, Rectal, Q4H PRN, Juan Antonio Lee MD, 325 mg at 11/04/24 0331    aspirin chewable tablet 81 mg, 81 mg, Nasogastric, Daily, Juan Antonio Lee MD, 81 mg at 11/08/24 0820    cefTRIAXone (ROCEPHIN) 2,000 mg in sodium chloride 0.9 % 100 mL MBP, 2,000 mg, Intravenous, Q24H, Neymar Gerber MD, Last Rate: 200 mL/hr at 11/08/24 1800, 2,000 mg at 11/08/24 1800    chlorhexidine (PERIDEX) 0.12 % solution 15 mL, 15 mL, Mouth/Throat, Q12H, Joyce Jauregui APRN, 15 mL at 11/08/24 2110    folic acid (FOLVITE) tablet 400 mcg, 400 mcg, Nasogastric, Daily, Juan Antonio Lee MD, 400 mcg at 11/08/24 0820    ipratropium-albuterol (DUO-NEB) nebulizer solution 3 mL, 3 mL, Nebulization, Q6H PRN, Kenny Gerber MD, 3 mL at 11/08/24 1230    lansoprazole (PREVACID SOLUTAB) disintegrating tablet Tablet Delayed Release Dispersible 30 mg, 30 mg, Nasogastric, QAM AC, Joyce Jauregui APRN, 30 mg at 11/08/24 0820    nitroglycerin (NITROSTAT) SL tablet 0.4 mg, 0.4 mg, Sublingual, Q5 Min PRN, Juan Antonio Lee MD    ondansetron (ZOFRAN) injection 4 mg, 4 mg, Intravenous, Q6H PRN, Gilda Jacobo APRN, 4 mg at 11/09/24 0536    sodium chloride 0.9 % flush 10 mL, 10 mL, Intravenous, Q12H, Juan Antonio Lee MD, 10 mL at 11/08/24 2111    sodium chloride 0.9 % flush 10 mL, 10 mL, Intravenous, PRN, Juan Antonio Lee MD    sodium chloride 0.9 % flush 10 mL, 10 mL, Intravenous, Q12H, Juan Antonio Lee MD, 10 mL at 11/08/24 2111    sodium chloride 0.9 % flush 10 mL, 10 mL, Intravenous, Q12H, Juan Antonio Lee MD, 10 mL at 11/08/24 2111    sodium chloride 0.9 % flush 10 mL, 10 mL, Intravenous, Q12H, Juan Antonio Lee MD, 10 mL at 11/08/24 2111    sodium chloride 0.9 % flush 10 mL, 10 mL, Intravenous, Q12H, Juan Antonio Lee MD, 10 mL  "at 11/08/24 2110    sodium chloride 0.9 % flush 10 mL, 10 mL, Intravenous, PRNIC, Juan Antonio Lee MD    sodium chloride 0.9 % flush 20 mL, 20 mL, Intravenous, PRN, Juan Antonio Lee MD    sodium chloride 0.9 % infusion 40 mL, 40 mL, Intravenous, PRNIC, Juan Antonio Lee MD    sodium chloride 0.9 % infusion 40 mL, 40 mL, Intravenous, PRJesus LUCERO Lebnan S, MD    traMADol (ULTRAM) tablet 50 mg, 50 mg, Oral, Q6H PRN, Shree Agrawal MD, 50 mg at 11/08/24 2146      Objective   Vital Signs   Temp:  [97.7 °F (36.5 °C)-98.7 °F (37.1 °C)] 98.1 °F (36.7 °C)  Heart Rate:  [81-98] 98  Resp:  [16-23] 23  BP: (125-157)/(57-92) 149/78    Physical Exam:   General: awake, alert, NAD, slightly confused  Eyes: no scleral icterus  ENT: no thrush  Cardiovascular: Normal rate  Respiratory: normal work of breathing on 2 L nasal cannula  GI: Abdomen is soft, not tender  : + Booth catheter with dark urine present  Skin: No rashes  Vasc: PIV w/o erythema    Labs:     Lab Results   Component Value Date    WBC 10.16 11/09/2024    HGB 10.2 (L) 11/09/2024    HCT 30.1 (L) 11/09/2024    MCV 87.8 11/09/2024     (L) 11/09/2024       Lab Results   Component Value Date    GLUCOSE 143 (H) 11/08/2024    BUN 51 (H) 11/08/2024    CREATININE 1.27 (H) 11/08/2024    BCR 40.2 (H) 11/08/2024    CO2 28.0 11/08/2024    CALCIUM 8.1 (L) 11/08/2024    ALBUMIN 2.9 (L) 11/04/2024    AST 42 (H) 11/04/2024    ALT 22 11/04/2024     No results found for: \"HGBA1C\"    Microbiology:  11/3 BCx: Klebsiella pneumoniae (susceptible to all antibiotics tested except ampicillin)  UCx:     Radiology:  11/8 Doppler with left upper extremity SVT    11/8 CXR shows small pleural effusions    11/3 CT abdomen/pelvis shows a 5 mm stone in the distal right ureter with mild-moderate right hydroureteronephrosis    EKG personally reviewed and shows a QTc of 485 ms    ASSESSMENT/PLAN:  Klebsiella septicemia due to urinary source  Right sided obstructing ureteral stone with " hydronephrosis  Hyponatremia  Acute hypoxic respiratory failure -better  Elevated procalcitonin  Acute kidney injury  Chronic diastolic heart failure  Childhood penicillin allergy    On 11/3/2024, she went to the operating room with urology for cystoscopy and insertion of a right sided ureteral stent.  Blood cultures are growing Klebsiella.  I recommend that she continue ceftriaxone through 11/12/2024 which will complete a 10-day course.  If she is ready to discharge prior to this time then she can be changed to renally dosed Augmentin 875-125 with the same stop date.  She says her penicillin allergy was a rash in the 1960s.  I reassured her that greater than 90% of people outgrow their penicillin allergies within 10 years.  Additionally, she says she has tolerated amoxicillin since that time.    Thank you for allowing me to be involved in the care of this patient. Infectious diseases will sign off at this time with antibiotics plan in place, but please call me at [phone] if any further ID questions or new ID concerns.

## 2024-11-09 NOTE — PLAN OF CARE
Goal Outcome Evaluation:  Plan of Care Reviewed With: patient        Progress: no change  Outcome Evaluation: pt alert and orientatd x4 overnight; VSS; no s.s of distress noted; pt complaints of pain and nausea overnight; new orders recieved from APRN; will continue to monitor;            Problem: Adult Inpatient Plan of Care  Goal: Absence of Hospital-Acquired Illness or Injury  Intervention: Identify and Manage Fall Risk  Recent Flowsheet Documentation  Taken 11/9/2024 0400 by Mame Velazquez RN  Safety Promotion/Fall Prevention: safety round/check completed  Taken 11/9/2024 0200 by Mame Velazquez RN  Safety Promotion/Fall Prevention: safety round/check completed  Taken 11/9/2024 0000 by Mame Velazquez RN  Safety Promotion/Fall Prevention: safety round/check completed  Taken 11/8/2024 2216 by Mame Velazquez, RN  Safety Promotion/Fall Prevention: safety round/check completed  Taken 11/8/2024 2000 by Mame Velazquez, RN  Safety Promotion/Fall Prevention: safety round/check completed

## 2024-11-09 NOTE — THERAPY EVALUATION
Patient Name: Kristyn Lugo  : 1940    MRN: [id number]                              Today's Date: 2024       Admit Date: 11/3/2024    Visit Dx:     ICD-10-CM ICD-9-CM   1. Septic shock  A41.9 038.9    R65.21 785.52     995.92   2. Acute kidney injury  N17.9 584.9   3. Occult GI bleeding  R19.5 792.1   4. Acute respiratory failure with hypoxia  J96.01 518.81   5. Acute pyelonephritis  N10 590.10   6. Kidney stone on right side, distal UVJ with hydronephrosis  N20.0 592.0     Patient Active Problem List   Diagnosis    Lumbar spinal stenosis    Spondylolisthesis of lumbar region    Acute pyelonephritis     Past Medical History:   Diagnosis Date    Aortic valve regurgitation     Chronic kidney disease     Low back pain      Past Surgical History:   Procedure Laterality Date    CYSTOSCOPY W/ URETERAL STENT PLACEMENT Right 11/3/2024    Procedure: CYSTOSCOPY URETERAL CATHETER/STENT INSERTION;  Surgeon: Tony Ledesma MD;  Location: [facility];  Service: Urology;  Laterality: Right;      General Information       Row Name 24 1304          Physical Therapy Time and Intention    Document Type evaluation  -DJ     Mode of Treatment individual therapy;physical therapy  -DJ       Row Name 24 1304          General Information    Patient Profile Reviewed yes  -DJ     Prior Level of Function independent:;ADL's;community mobility;driving  -DJ     Existing Precautions/Restrictions fall  -DJ     Barriers to Rehab medically complex;cognitive status  -DJ       Row Name 24 1304          Living Environment    People in Home alone  -DJ       Row Name 24 1304          Cognition    Orientation Status (Cognition) oriented x 3;other (see comments)  soft voice from intubation, increased time to respond  -DJ       Row Name 24 1304          Safety Issues/Impairments Affecting Functional Mobility    Safety Issues Affecting Function (Mobility) judgment;safety precaution awareness  -DJ      Impairments Affecting Function (Mobility) balance;endurance/activity tolerance;strength;pain  -DJ     Comment, Safety Issues/Impairments (Mobility) gt belt, nonskid socks  -DJ               User Key  (r) = Recorded By, (t) = Taken By, (c) = Cosigned By      Initials Name Provider Type    Sera Gudino PT Physical Therapist                   Mobility       Row Name 11/09/24 1307          Bed Mobility    Bed Mobility supine-sit;sit-supine  -DJ     Supine-Sit [place] (Bed Mobility) moderate assist (50% patient effort);1 person assist;nonverbal cues (demo/gesture);verbal cues  -DJ     Sit-Supine [place] (Bed Mobility) maximum assist (25% patient effort);2 person assist;verbal cues;nonverbal cues (demo/gesture)  -DJ     Assistive Device (Bed Mobility) bed rails;head of bed elevated;leg   -DJ     Comment, (Bed Mobility) vc for sequencing, increased time  -DJ       Row Name 11/09/24 1307          Transfers    Comment, (Transfers) sit/stand from EOB x 2 attempts  -DJ       Row Name 11/09/24 1307          Bed-Chair Transfer    Bed-Chair [place] (Transfers) unable to assess  -DJ       Row Name 11/09/24 1307          Sit-Stand Transfer    Sit-Stand [place] (Transfers) moderate assist (50% patient effort);maximum assist (25% patient effort);2 person assist;verbal cues  -DJ     Assistive Device (Sit-Stand Transfers) walker, front-wheeled  -DJ     Comment, (Sit-Stand Transfer) pt barely cleared bottom off bed with each attempt  -DJ       Row Name 11/09/24 1307          Gait/Stairs (Locomotion)    [place] Level (Gait) unable to assess  -DJ     Distance in Feet (Gait) 0  -DJ     Comment, (Gait/Stairs) Unable to take any steps or MIP  -DJ               User Key  (r) = Recorded By, (t) = Taken By, (c) = Cosigned By      Initials Name Provider Type    Sera Gudino PT Physical Therapist                   Obj/Interventions       Row Name 11/09/24 1309          Range of Motion Comprehensive     Comment, General Range of Motion grossly WFL  -DJ       Row Name 11/09/24 1309          Strength Comprehensive (MMT)    Comment, General Manual Muscle Testing (MMT) Assessment grossly 3+/5 LE strength, generally weak, mmoves all 4s  -DJ       Row Name 11/09/24 1309          Motor Skills    Motor Skills functional endurance  -DJ     Functional Endurance poor  -DJ       Row Name 11/09/24 1309          Balance    Balance Assessment standing static balance;standing dynamic balance  -DJ     Static Standing Balance moderate assist;2-person assist;verbal cues  -DJ     Dynamic Standing Balance moderate assist;maximum assist;2-person assist;verbal cues  -DJ     Position/Device Used, Standing Balance walker, front-wheeled;supported  -DJ     Balance Interventions sitting;standing;sit to stand;supported  -DJ     Comment, Balance unsteady  -DJ       Row Name 11/09/24 1309          Sensory Assessment (Somatosensory)    Sensory Assessment (Somatosensory) not tested  -DJ               User Key  (r) = Recorded By, (t) = Taken By, (c) = Cosigned By      Initials Name Provider Type    Sera Gudino, PT Physical Therapist                   Goals/Plan       Row Name 11/09/24 1320          Bed Mobility Goal 1 (PT)    Activity/Assistive Device (Bed Mobility Goal 1, PT) sit to supine;supine to sit  -DJ     [place] Level/Cues Needed (Bed Mobility Goal 1, PT) standby assist;verbal cues required  -DJ     Time Frame (Bed Mobility Goal 1, PT) 10 days  -DJ       Row Name 11/09/24 1320          Transfer Goal 1 (PT)    Activity/Assistive Device (Transfer Goal 1, PT) sit-to-stand/stand-to-sit  -DJ     [place] Level/Cues Needed (Transfer Goal 1, PT) verbal cues required;standby assist  -DJ     Time Frame (Transfer Goal 1, PT) 10 days  -DJ       Row Name 11/09/24 1320          Gait Training Goal 1 (PT)    Activity/Assistive Device (Gait Training Goal 1, PT) gait (walking locomotion);assistive device use;improve balance and speed;increase  endurance/gait distance;increase energy conservation  -DJ     [place] Level (Gait Training Goal 1, PT) contact guard required;verbal cues required  -DJ     Distance (Gait Training Goal 1, PT) 100' with least retrictive AD  -DJ     Time Frame (Gait Training Goal 1, PT) 10 days  -DJ       Row Name 11/09/24 1320          Patient Education Goal (PT)    Activity (Patient Education Goal, PT) HEP  -DJ     [place]/Cues/Accuracy (Memory Goal 2, PT) demonstrates adequately;verbalizes understanding  -DJ     Time Frame (Patient Education Goal, PT) 5 days;short term goal (STG)  -DJ       Row Name 11/09/24 1320          Therapy Assessment/Plan (PT)    Planned Therapy Interventions (PT) balance training;bed mobility training;gait training;home exercise program;strengthening;postural re-education;patient/family education;transfer training  -DJ               User Key  (r) = Recorded By, (t) = Taken By, (c) = Cosigned By      Initials Name Provider Type    Sera Gudino, PT Physical Therapist                   Clinical Impression       Row Name 11/09/24 1310          Pain    Pretreatment Pain Rating 0/10 - no pain  -DJ     Pain Side/Orientation generalized  -DJ       Row Name 11/09/24 1310          Plan of Care Review    Plan of Care Reviewed With patient  -DJ     Outcome Evaluation 83yo white female admitted 11/3/24 with acute pyelonephritis and n/v/d; now s/p cystoscope with catheter placement 11/3/24. Pt in septic shock and wasintubated. PMH includes spinal stenosis and Lumbar spondylolisthesis. PLOF: Pt lives alone and was independent [facility] ADLs including driving. She used a cane for mobility. She is limited now by generalized weakness and decreased activity tolerance. Today, she was found sleeping in bed and looked exhausted. She was agreeable to participate in therapy. She req mod A to sit EOB with vc for sequencing. She stood from EOB with mod-max A of 2 using r wx. She barely cleared her hips off bed on each  attempt and was unable to take any steps or MIP. She returned supine in bed with max A Of 2 and was dependent to reposition. She would certainly benefit from skilled PT servcies to address functional deficits and prepare for d/c. She may req rehab priro to returning home alone.  -DJ       Row Name 11/09/24 1310          Therapy Assessment/Plan (PT)    Patient/Family Therapy Goals Statement (PT) rehab  -DJ     Rehab Potential (PT) fair  -DJ     Criteria for Skilled Interventions Met (PT) yes;meets criteria;skilled treatment is necessary  -DJ     Therapy Frequency (PT) 6 times/wk  -DJ       Row Name 11/09/24 1310          Vital Signs    O2 Delivery Pre Treatment nasal cannula  -DJ     O2 Delivery Intra Treatment nasal cannula  -DJ     O2 Delivery Post Treatment nasal cannula  -DJ     Pre Patient Position Supine  -DJ     Intra Patient Position Standing  -DJ     Post Patient Position Supine  -DJ       Row Name 11/09/24 1310          Positioning and Restraints    Pre-Treatment Position in bed  -DJ     Post Treatment Position bed  -DJ     In Bed notified nsg;supine;call light within reach;encouraged to call for assist;exit alarm on;with family/caregiver;heels elevated;RUE elevated;LUE elevated  -DJ               User Key  (r) = Recorded By, (t) = Taken By, (c) = Cosigned By      Initials Name Provider Type    Sera Gudino, PT Physical Therapist                   Outcome Measures       Row Name 11/09/24 1323          How much help from another person do you currently need...    Turning from your back to your side while in flat bed without using bedrails? 3  -DJ     Moving from lying on back to sitting on the side of a flat bed without bedrails? 2  -DJ     Moving to and from a bed to a chair (including a wheelchair)? 2  -DJ     Standing up from a chair using your arms (e.g., wheelchair, bedside chair)? 2  -DJ     Climbing 3-5 steps with a railing? 1  -DJ     To walk in hospital room? 2  -DJ     AM-PAC 6 Clicks Score (PT)  12  -DJ     Highest Level of Mobility Goal 4 --> Transfer to chair/commode  -DJ       Row Name 11/09/24 1323 11/09/24 1149       Functional Assessment    Outcome Measure Options AM-PAC 6 Clicks Basic Mobility (PT)  -DJ AM-PAC 6 Clicks Daily Activity (OT)  -PP              User Key  (r) = Recorded By, (t) = Taken By, (c) = Cosigned By      Initials Name Provider Type    DJ Sera Morales, PT Physical Therapist    PP Susan Beckham, OT Occupational Therapist                                 Physical Therapy Education       Title: PT OT SLP Therapies (In Progress)       Topic: Physical Therapy (Done)       Point: Mobility training (Done)       Learning Progress Summary            Patient Acceptance, E, VU,NR by DJ at 11/9/2024 1323                      Point: Home exercise program (Done)       Learning Progress Summary            Patient Acceptance, E, VU,NR by DJ at 11/9/2024 1323                      Point: Body mechanics (Done)       Learning Progress Summary            Patient Acceptance, E, VU,NR by DJ at 11/9/2024 1323                      Point: Precautions (Done)       Learning Progress Summary            Patient Acceptance, E, VU,NR by  at 11/9/2024 1323                                      User Key       Initials Effective Dates Name Provider Type Discipline    DJ 10/25/19 -  Sera Morales, PT Physical Therapist PT                  PT Recommendation and Plan  Planned Therapy Interventions (PT): balance training, bed mobility training, gait training, home exercise program, strengthening, postural re-education, patient/family education, transfer training  Outcome Evaluation: 85yo white female admitted 11/3/24 with acute pyelonephritis and n/v/d; now s/p cystoscope with catheter placement 11/3/24. Pt in septic shock and wasintubated. PMH includes spinal stenosis and Lumbar spondylolisthesis. PLOF: Pt lives alone and was independent [facility] ADLs including driving. She used a cane for mobility. She is limited now  by generalized weakness and decreased activity tolerance. Today, she was found sleeping in bed and looked exhausted. She was agreeable to participate in therapy. She req mod A to sit EOB with vc for sequencing. She stood from EOB with mod-max A of 2 using r wx. She barely cleared her hips off bed on each attempt and was unable to take any steps or MIP. She returned supine in bed with max A Of 2 and was dependent to reposition. She would certainly benefit from skilled PT servcies to address functional deficits and prepare for d/c. She may req rehab priro to returning home alone.     Time Calculation:   PT Evaluation Complexity  History, PT Evaluation Complexity: 3 or more personal factors and/or comorbidities  Examination of Body Systems (PT Eval Complexity): total of 4 or more elements  Clinical Presentation (PT Evaluation Complexity): evolving  Clinical Decision Making (PT Evaluation Complexity): moderate complexity  Overall Complexity (PT Evaluation Complexity): moderate complexity     PT Charges       Row Name 11/09/24 1324             Time Calculation    Start Time 0910  -DJ      Stop Time 0937  -DJ      Time Calculation (min) 27 min  -DJ      PT Non-Billable Time (min) 10 min  -DJ      PT Received On 11/09/24  -FAMILIA      PT - Next Appointment 11/11/24  -FAMILIA      PT Goal Re-Cert Due Date 11/19/24  -DJ                User Key  (r) = Recorded By, (t) = Taken By, (c) = Cosigned By      Initials Name Provider Type    Sera Gudino, ROBERT Physical Therapist                  Therapy Charges for Today       Code Description Service Date Service Provider Modifiers Qty    23060619928 HC PT EVAL MOD COMPLEXITY 3 11/9/2024 Sera Morales, PT GP 1    24461435042 HC PT THERAPEUTIC ACT EA 15 MIN 11/9/2024 Sera Morales, PT GP 2            PT G-Codes  Outcome Measure Options: AM-PAC 6 Clicks Basic Mobility (PT)  AM-PAC 6 Clicks Score (PT): 12  AM-PAC 6 Clicks Score (OT): 9  PT Discharge Summary  Anticipated Discharge Disposition  (PT): skilled nursing facility, inpatient rehabilitation facility    Sera Morales, PT  11/9/2024

## 2024-11-09 NOTE — PROGRESS NOTES
PROGRESS NOTE      Patient Name: Kristyn Lugo  : 1940  MRN: [id number]  Primary Care Physician: Sean Fisher MD  Date of admission: 11/3/2024    Patient Care Team:  Sean Fisher MD as PCP - General (Internal Medicine)        Subjective   Subjective:     Patient seen and examined, no new issues, no distress  UOP 1L    Review of systems:  All ROS negative      Allergies:    Allergies   Allergen Reactions    Fenofibrate Unknown - High Severity     Pt is unaware    Penicillins Rash     caused a rash in the 1960s; she says she has tolerated amoxicillin or Augmentin since that time.       Objective   Exam:     Vital Signs  Temp:  [97.2 °F (36.2 °C)-98.5 °F (36.9 °C)] 97.2 °F (36.2 °C)  Heart Rate:  [83-99] 99  Resp:  [16-23] 22  BP: (148-157)/(73-92) 153/82  SpO2:  [94 %-95 %] 95 %  on  Flow (L/min) (Oxygen Therapy):  [2] 2;   Device (Oxygen Therapy): humidified;nasal cannula  Body mass index is 33.73 kg/m².    General:  elderly  female intubated sedated.    Head:      Normocephalic and atraumatic.    Eyes:      PERRL/EOM intact, conjunctivae and sclerae clear without nystagmus.    Neck:      No masses, thyromegaly,  trachea central with normal respiratory effort   Lungs:    Clear bilaterally to auscultation.    Heart:      Regular rate and rhythm, no murmur no gallop  Abd:        Soft, nontender, not distended, bowel sounds positive, no shifting dullness   Pulses:   Pulses palpable  Extr:        No cyanosis or clubbing--mild edema.    Neuro:    sedated  Skin:       Intact without lesions or rashes.    Psych:    Alert and cooperative; normal mood and affect; .      Results Review:  I have personally reviewed most recent Data :  CBC    Results from last 7 days   Lab Units 24  0634 24  0441 24  0450 24  0443 24  0322 24  1203 24  0526   WBC 10*3/mm3 10.16 8.84 11.80* 19.97* 27.79* 35.25* 32.40*   HEMOGLOBIN g/dL 10.2* 9.4* 9.6* 9.9* 10.7* 11.7* 11.6*    PLATELETS 10*3/mm3 126* 93* 74* 84* 103* 156 182     CMP   Results from last 7 days   Lab Units 11/08/24  0441 11/07/24  0450 11/06/24  0443 11/05/24  0322 11/04/24  1823 11/04/24  1203 11/04/24  0526 11/03/24  1546   SODIUM mmol/L 148* 142 143 140 144 139 141 140   POTASSIUM mmol/L 3.4* 3.1* 3.5 3.4* 3.7 3.8 2.9* 3.6   CHLORIDE mmol/L 111* 108* 111* 108* 109* 109* 109* 107   CO2 mmol/L 28.0 23.3 18.5* 18.0* 17.5* 15.5* 15.0* 19.9*   BUN mg/dL 51* 50* 42* 39* 39* 37* 36* 30*   CREATININE mg/dL 1.27* 1.50* 1.70* 1.99* 2.15* 2.54* 2.41* 2.17*   GLUCOSE mg/dL 143* 173* 124* 118* 128* 136* 126* 97   ALBUMIN g/dL  --   --   --   --   --  2.9*  --  3.1*   BILIRUBIN mg/dL  --   --   --   --   --  0.6  --  0.4   ALK PHOS U/L  --   --   --   --   --  102  --  53   AST (SGOT) U/L  --   --   --   --   --  42*  --  33*   ALT (SGPT) U/L  --   --   --   --   --  22  --  18     ABG    Results from last 7 days   Lab Units 11/07/24  0945 11/07/24  0333 11/06/24  1138 11/06/24  0440 11/05/24  0703 11/05/24  0334 11/04/24  1207   PH, ARTERIAL pH units 7.405 7.395 7.396 7.305* 7.364 7.322* 7.285*   PCO2, ARTERIAL mm Hg 42.9 38.9 34.1* 40.8 35.3 38.2 36.8   PO2 ART mm Hg 73.0* 80.1 59.7* 74.0* 83.9 80.1 107.3*   O2 SATURATION ART % 94.5 95.7 90.6* 93.2 96.0 94.8 97.5   BASE EXCESS ART mmol/L 1.9 -0.9* -3.5* -5.7* -4.7* -5.8* -8.5*     No radiology results for the last day    Results for orders placed during the hospital encounter of 11/03/24    Adult Transthoracic Echo Complete W/ Cont if Necessary Per Protocol    Interpretation Summary    Left ventricular systolic function is normal. Calculated left ventricular EF = 52.4%    Left ventricular wall thickness is consistent with mild septal asymmetric hypertrophy.    Left ventricular diastolic function is consistent with age.    Mild aortic valve stenosis is present.    Peak velocity of the flow distal to the aortic valve is 251 cm/s. Aortic valve maximum pressure gradient is 25 mmHg.  Aortic valve mean pressure gradient is 14 mmHg. Aortic valve dimensionless index is 0.6 .    Mild mitral valve stenosis is present. The mitral valve mean gradient is 5 mmHg.    Estimated right ventricular systolic pressure from tricuspid regurgitation is normal (<35 mmHg).    Scheduled Meds:aspirin, 81 mg, Nasogastric, Daily  cefTRIAXone, 2,000 mg, Intravenous, Q24H  chlorhexidine, 15 mL, Mouth/Throat, Q12H  folic acid, 400 mcg, Nasogastric, Daily  ipratropium-albuterol, 3 mL, Nebulization, BID  lansoprazole, 30 mg, Nasogastric, QAM AC  sodium chloride, 10 mL, Intravenous, Q12H  sodium chloride, 10 mL, Intravenous, Q12H  sodium chloride, 10 mL, Intravenous, Q12H  sodium chloride, 10 mL, Intravenous, Q12H  sodium chloride, 10 mL, Intravenous, Q12H      Continuous Infusions:     PRN Meds:  acetaminophen **OR** acetaminophen    nitroglycerin    ondansetron    sodium chloride    sodium chloride    sodium chloride    sodium chloride    sodium chloride    traMADol    Assessment & Plan   Assessment and Plan:         Acute pyelonephritis    ASSESSMENT:  Acute kidney injury  Chronic kidney disease, stage 3, with baseline creatinine roughly 1.0mg/dL; felt to be secondary to hypertension and advanced age  Metabolic acidosis  Hypokalemia  Right hydronephrosis with nephrolithiasis  Right distal ureteral calculi, s/p cysto with stent insertion 11/3/24  Sepsis  Acute hypoxemic respiratory failure  Chronic diastolic heart failure  Hypernatremia     PLAN :     No new labs today  Most recent labs from yesterday show improved renal function, made 1L urine  CXR 11/8 with more opacities/infiltrates, no pleural effusion  Now off pressors  Hypernatremia, on free water. Check bmp now and again in the am  EMMA most likely multifactorial and secondary to hemodynamic insults, sepsis and right hydronephrosis, most likely has ATN  Making urine. Urine studies c/w prerenal picture   Proteinuria 1.5 gm f/u with SPEP  Supplement electrolytes as per  protocol  Continue to follow-up with the volume status closely  Keep MAP>65  Antibiotics as per primary team. Currently on Rocephin  Will request strict I/Os, daily labs  Discussed in detail with ELENA Hall kidney consultant  [phone]  11/9/2024  15:22 EST

## 2024-11-09 NOTE — PLAN OF CARE
Goal Outcome Evaluation:  Plan of Care Reviewed With: patient           Outcome Evaluation: 83yo white female admitted 11/3/24 with acute pyelonephritis and n/v/d; now s/p cystoscope with catheter placement 11/3/24. Pt in septic shock and wasintubated. PMH includes spinal stenosis and Lumbar spondylolisthesis. PLOF: Pt lives alone and was independent [facility] ADLs including driving. She used a cane for mobility. She is limited now by generalized weakness and decreased activity tolerance. Today, she was found sleeping in bed and looked exhausted. She was agreeable to participate in therapy. She req mod A to sit EOB with vc for sequencing. She stood from EOB with mod-max A of 2 using r wx. She barely cleared her hips off bed on each attempt and was unable to take any steps or MIP. She returned supine in bed with max A Of 2 and was dependent to reposition. She would certainly benefit from skilled PT servcies to address functional deficits and prepare for d/c. She may req rehab priro to returning home alone.    Anticipated Discharge Disposition (PT): skilled nursing facility, inpatient rehabilitation facility

## 2024-11-09 NOTE — PLAN OF CARE
Goal Outcome Evaluation:  Plan of Care Reviewed With: patient, daughter        Progress: no change  Outcome Evaluation: Pt is a 83 y/o female admitted to [facility] on 11/3/24 for sepsis and found to be in septic shock 2/2 acute pyelonephritis and underwent cystoscopy with stent placement for ureteral calculi. She came back to the ICU intubated requiring vasopressor support and extubated 11/7. Pt presents to OT w/ decreased endurance/ act ricardo, decreased strength, increased pain/discomfort and impaired bal. Skilled OT req to address fxnl deficits and decreased (I) w/ ADLs. Today pt is Mod A for supine to sit EOB. She tolerated sitting EOB w/ Cga and Max A x2 /rwx for 2x STS from EOB. She was noted to barely clear her bottom from bed. Dep for most ADLs, except s/u for grooming. She was Max A to return to supine and was left w/ all needs met. Dc rec to SNF at this time. OT will continue to monitor and prog as ricardo.    Anticipated Discharge Disposition (OT): skilled nursing facility

## 2024-11-09 NOTE — THERAPY EVALUATION
Patient Name: Kristyn Lugo  : 1940    MRN: [id number]                              Today's Date: 2024       Admit Date: 11/3/2024    Visit Dx:     ICD-10-CM ICD-9-CM   1. Septic shock  A41.9 038.9    R65.21 785.52     995.92   2. Acute kidney injury  N17.9 584.9   3. Occult GI bleeding  R19.5 792.1   4. Acute respiratory failure with hypoxia  J96.01 518.81   5. Acute pyelonephritis  N10 590.10   6. Kidney stone on right side, distal UVJ with hydronephrosis  N20.0 592.0     Patient Active Problem List   Diagnosis    Lumbar spinal stenosis    Spondylolisthesis of lumbar region    Acute pyelonephritis     Past Medical History:   Diagnosis Date    Aortic valve regurgitation     Chronic kidney disease     Low back pain      Past Surgical History:   Procedure Laterality Date    CYSTOSCOPY W/ URETERAL STENT PLACEMENT Right 11/3/2024    Procedure: CYSTOSCOPY URETERAL CATHETER/STENT INSERTION;  Surgeon: Tony Ledesma MD;  Location: [facility];  Service: Urology;  Laterality: Right;      General Information       Row Name 24 1126          OT Time and Intention    Document Type evaluation  -PP     Mode of Treatment occupational therapy;co-treatment;physical therapy  co-tx d/t pt's activity limitation due to fatigue/weakness and acuity level; pt working on functional balance and strengthening while performing ADL's.  -PP     Patient Effort adequate  -PP       Row Name 24 1126          General Information    Patient Profile Reviewed yes  -PP     Prior Level of Function independent:;ADL's;all household mobility;community mobility;driving  -PP     Existing Precautions/Restrictions fall  -PP     Barriers to Rehab medically complex  -PP       Row Name 24 1126          Living Environment    People in Home alone  -PP       Row Name 24 1126          Cognition    Orientation Status (Cognition) oriented x 3  -PP       Row Name 24 1126          Safety Issues/Impairments  Affecting Functional Mobility    Impairments Affecting Function (Mobility) balance  -PP     Comment, Safety Issues/Impairments (Mobility) gait belt and non skid socks worn for safety  -PP               User Key  (r) = Recorded By, (t) = Taken By, (c) = Cosigned By      Initials Name Provider Type    PP Susan Beckham OT Occupational Therapist                     Mobility/ADL's       Row Name 11/09/24 1129          Bed Mobility    Bed Mobility supine-sit;sit-supine  -PP     Supine-Sit [place] (Bed Mobility) moderate assist (50% patient effort);1 person assist;nonverbal cues (demo/gesture);verbal cues  -PP     Sit-Supine [place] (Bed Mobility) maximum assist (25% patient effort);2 person assist;verbal cues;nonverbal cues (demo/gesture)  -PP     Assistive Device (Bed Mobility) bed rails;head of bed elevated  -PP       Row Name 11/09/24 1129          Transfers    Transfers sit-stand transfer;stand-sit transfer  -PP       Row Name 11/09/24 1129          Sit-Stand Transfer    Sit-Stand [place] (Transfers) maximum assist (25% patient effort);2 person assist  -PP     Assistive Device (Sit-Stand Transfers) walker, front-wheeled  -PP     Comment, (Sit-Stand Transfer) Pt attempted 2x STS from EOB; pt barely cleared bottom off of bed  -PP       Row Name 11/09/24 1129          Functional Mobility    Functional Mobility- Ind. Level unable to perform  -PP       Row Name 11/09/24 1129          Activities of Daily Living    BADL Assessment/Intervention lower body dressing;grooming;feeding;toileting  -PP       Row Name 11/09/24 1129          Lower Body Dressing Assessment/Training    [place] Level (Lower Body Dressing) dependent (less than 25% patient effort);socks;don  -PP       Row Name 11/09/24 1129          Grooming Assessment/Training    [place] Level (Grooming) grooming skills;standby assist  -PP       Row Name 11/09/24 1129          Self-Feeding Assessment/Training    [place] Level (Feeding)  feeding skills;dependent (less than 25% patient effort)  -PP     Comment, (Feeding) [place]  -PP       Row Name 11/09/24 1129          Toileting Assessment/Training    [place] Level (Toileting) toileting skills;dependent (less than 25% patient effort)  -PP     Comment, (Toileting) Booth catheter and brief in place. Pt noted to be having BM upon standing during session but was unaware. Req total assist for hygiene  -PP               User Key  (r) = Recorded By, (t) = Taken By, (c) = Cosigned By      Initials Name Provider Type    PP Susan Beckham OT Occupational Therapist                   Obj/Interventions       Row Name 11/09/24 1135          Sensory Assessment (Somatosensory)    Sensory Assessment (Somatosensory) UE sensation intact  -PP       Row Name 11/09/24 1135          Vision Assessment/Intervention    Visual Impairment/Limitations WFL  -PP       Row Name 11/09/24 1135          Range of Motion Comprehensive    General Range of Motion bilateral upper extremity ROM WFL  -PP       Row Name 11/09/24 1135          Strength Comprehensive (MMT)    Comment, General Manual Muscle Testing (MMT) Assessment BUE Generalized weakness noted, grossly 3+/5  -PP       Row Name 11/09/24 1135          Motor Skills    Motor Skills functional endurance  -PP     Functional Endurance poor d/t fatigue and weakness  -PP       Row Name 11/09/24 1135          Balance    Balance Assessment sitting static balance;sitting dynamic balance  -PP     Static Sitting Balance contact guard;standby assist  -PP     Dynamic Sitting Balance contact guard  -PP     Position, Sitting Balance sitting edge of bed  -PP               User Key  (r) = Recorded By, (t) = Taken By, (c) = Cosigned By      Initials Name Provider Type    PP Susan Beckham OT Occupational Therapist                   Goals/Plan       Row Name 11/09/24 1149          Bed Mobility Goal 1 (OT)    Activity/Assistive Device (Bed Mobility Goal 1, OT) bed mobility  activities, all  -PP     [place] Level/Cues Needed (Bed Mobility Goal 1, OT) modified independence  -PP     Time Frame (Bed Mobility Goal 1, OT) short term goal (STG);2 weeks  -PP       Row Name 11/09/24 1149          Transfer Goal 1 (OT)    Activity/Assistive Device (Transfer Goal 1, OT) transfers, all  -PP     [place] Level/Cues Needed (Transfer Goal 1, OT) modified independence  -PP     Time Frame (Transfer Goal 1, OT) short term goal (STG);2 weeks  -PP     Progress/Outcome (Transfer Goal 1, OT) new goal  -PP       Row Name 11/09/24 1149          Dressing Goal 1 (OT)    Activity/Device (Dressing Goal 1, OT) dressing skills, all;upper body dressing;lower body dressing  -PP     [place]/Cues Needed (Dressing Goal 1, OT) modified independence  -PP     Time Frame (Dressing Goal 1, OT) short term goal (STG);2 weeks  -PP     Progress/Outcome (Dressing Goal 1, OT) new goal  -PP       Row Name 11/09/24 1149          Toileting Goal 1 (OT)    Activity/Device (Toileting Goal 1, OT) toileting skills, all  -PP     [place] Level/Cues Needed (Toileting Goal 1, OT) modified independence  -PP     Time Frame (Toileting Goal 1, OT) short term goal (STG);2 weeks  -PP     Progress/Outcome (Toileting Goal 1, OT) new goal  -PP       Row Name 11/09/24 1149          Grooming Goal 1 (OT)    Activity/Device (Grooming Goal 1, OT) grooming skills, all  -PP     [place] (Grooming Goal 1, OT) modified independence  -PP     Time Frame (Grooming Goal 1, OT) short term goal (STG);2 weeks  -PP     Progress/Outcome (Grooming Goal 1, OT) new goal  -PP       Row Name 11/09/24 1149          Therapy Assessment/Plan (OT)    Planned Therapy Interventions (OT) activity tolerance training;BADL retraining;functional balance retraining;occupation/activity based interventions;patient/caregiver education/training;strengthening exercise;transfer/mobility retraining  -PP               User Key  (r) = Recorded By, (t) = Taken By, (c) =  "Cosigned By      Initials Name Provider Type    PP Susan Beckham, CHE Occupational Therapist                   Clinical Impression       Row Name 11/09/24 1139          Pain Assessment    Pre/Posttreatment Pain Comment Pt reports she has \"A hole pain\" when asked by OT. She did not rate.  -PP       Row Name 11/09/24 1139          Plan of Care Review    Plan of Care Reviewed With patient;daughter  -PP     Progress no change  -PP     Outcome Evaluation Pt is a 83 y/o female admitted to [facility] on 11/3/24 for sepsis and found to be in septic shock 2/2 acute pyelonephritis and underwent cystoscopy with stent placement for ureteral calculi. She came back to the ICU intubated requiring vasopressor support and extubated 11/7. Pt presents to OT w/ decreased endurance/ act ricardo, decreased strength, increased pain/discomfort and impaired bal. Skilled OT req to address fxnl deficits and decreased (I) w/ ADLs. Today pt is Mod A for supine to sit EOB. She tolerated sitting EOB w/ Cga and Max A x2 /rwx for 2x STS from EOB. She was noted to barely clear her bottom from bed. Dep for most ADLs, except s/u for grooming. She was Max A to return to supine and was left w/ all needs met. Dc rec to SNF at this time. OT will continue to monitor and prog as ricardo.  -PP       Row Name 11/09/24 1133          Therapy Assessment/Plan (OT)    Rehab Potential (OT) good  -PP     Criteria for Skilled Therapeutic Interventions Met (OT) yes  -PP     Therapy Frequency (OT) 5 times/wk  -PP       Row Name 11/09/24 1139          Therapy Plan Review/Discharge Plan (OT)    Anticipated Discharge Disposition (OT) skilled nursing facility  -PP       Row Name 11/09/24 1130          Vital Signs    Pre SpO2 (%) 95  -PP     O2 Delivery Pre Treatment supplemental O2  -PP     Intra SpO2 (%) 93  -PP     O2 Delivery Intra Treatment supplemental O2  -PP     Post SpO2 (%) 95  -PP     O2 Delivery Post Treatment supplemental O2  -PP     Pre Patient Position Supine  -PP     " Intra Patient Position Standing  -PP     Post Patient Position Supine  -PP       Row Name 11/09/24 1139          Positioning and Restraints    Pre-Treatment Position in bed  -PP     Post Treatment Position bed  -PP     In Bed notified nsg;fowlers;call light within reach;encouraged to call for assist;exit alarm on;with family/caregiver  -PP               User Key  (r) = Recorded By, (t) = Taken By, (c) = Cosigned By      Initials Name Provider Type    PP Susan Beckham, CHE Occupational Therapist                   Outcome Measures       Row Name 11/09/24 1149          How much help from another is currently needed...    Putting on and taking off regular lower body clothing? 1  -PP     Bathing (including washing, rinsing, and drying) 1  -PP     Toileting (which includes using toilet bed pan or urinal) 1  -PP     Putting on and taking off regular upper body clothing 2  -PP     Taking care of personal grooming (such as brushing teeth) 3  -PP     Eating meals 1  -PP     AM-PAC 6 Clicks Score (OT) 9  -PP       Row Name 11/09/24 1323 11/09/24 0823       How much help from another person do you currently need...    Turning from your back to your side while in flat bed without using bedrails? 3  -DJ 3  -BW    Moving from lying on back to sitting on the side of a flat bed without bedrails? 2  -DJ 2  -BW    Moving to and from a bed to a chair (including a wheelchair)? 2  -DJ 2  -BW    Standing up from a chair using your arms (e.g., wheelchair, bedside chair)? 2  -DJ 2  -BW    Climbing 3-5 steps with a railing? 1  -DJ 1  -BW    To walk in hospital room? 2  -DJ 3  -BW    AM-PAC 6 Clicks Score (PT) 12  -DJ 13  -BW    Highest Level of Mobility Goal 4 --> Transfer to chair/commode  -DJ 4 --> Transfer to chair/commode  -BW      Row Name 11/09/24 1323 11/09/24 1149       Functional Assessment    Outcome Measure Options AM-PAC 6 Clicks Basic Mobility (PT)  -DJ AM-PAC 6 Clicks Daily Activity (OT)  -PP              User Key  (r) =  Recorded By, (t) = Taken By, (c) = Cosigned By      Initials Name Provider Type    Sera Gudino, PT Physical Therapist    Donna Polk, RN Registered Nurse    Susan Shi OT Occupational Therapist                    Occupational Therapy Education       Title: PT OT SLP Therapies (In Progress)       Topic: Occupational Therapy (In Progress)       Point: ADL training (Done)       Description:   Instruct learner(s) on proper safety adaptation and remediation techniques during self care or transfers.   Instruct in proper use of assistive devices.                  Learning Progress Summary            Patient Acceptance, E, VU by PP at 11/9/2024 1149    Comment: Pt Ed on OT role and dc planning.   Family Acceptance, E, VU by PP at 11/9/2024 1149    Comment: Pt Ed on OT role and dc planning.                      Point: Home exercise program (Not Started)       Description:   Instruct learner(s) on appropriate technique for monitoring, assisting and/or progressing therapeutic exercises/activities.                  Learner Progress:  Not documented in this visit.              Point: Precautions (Not Started)       Description:   Instruct learner(s) on prescribed precautions during self-care and functional transfers.                  Learner Progress:  Not documented in this visit.              Point: Body mechanics (Not Started)       Description:   Instruct learner(s) on proper positioning and spine alignment during self-care, functional mobility activities and/or exercises.                  Learner Progress:  Not documented in this visit.                              User Key       Initials Effective Dates Name Provider Type Discipline    DEBRA 06/09/23 -  Susan Beckham OT Occupational Therapist OT                  OT Recommendation and Plan  Planned Therapy Interventions (OT): activity tolerance training, BADL retraining, functional balance retraining, occupation/activity based interventions,  patient/caregiver education/training, strengthening exercise, transfer/mobility retraining  Therapy Frequency (OT): 5 times/wk  Plan of Care Review  Plan of Care Reviewed With: patient, daughter  Progress: no change  Outcome Evaluation: Pt is a 83 y/o female admitted to [facility] on 11/3/24 for sepsis and found to be in septic shock 2/2 acute pyelonephritis and underwent cystoscopy with stent placement for ureteral calculi. She came back to the ICU intubated requiring vasopressor support and extubated 11/7. Pt presents to OT w/ decreased endurance/ act ricardo, decreased strength, increased pain/discomfort and impaired bal. Skilled OT req to address fxnl deficits and decreased (I) w/ ADLs. Today pt is Mod A for supine to sit EOB. She tolerated sitting EOB w/ Cga and Max A x2 /rwx for 2x STS from EOB. She was noted to barely clear her bottom from bed. Dep for most ADLs, except s/u for grooming. She was Max A to return to supine and was left w/ all needs met. Dc rec to SNF at this time. OT will continue to monitor and prog as ricardo.     Time Calculation:   Evaluation Complexity (OT)  Review Occupational Profile/Medical/Therapy History Complexity: expanded/moderate complexity  Assessment, Occupational Performance/Identification of Deficit Complexity: 3-5 performance deficits  Clinical Decision Making Complexity (OT): detailed assessment/moderate complexity  Overall Complexity of Evaluation (OT): moderate complexity     Time Calculation- OT       Row Name 11/09/24 1123             Time Calculation- OT    OT Start Time 0914  -PP      OT Stop Time 0935  -PP      OT Time Calculation (min) 21 min  -PP      Total Timed Code Minutes- OT 11 minute(s)  -PP      OT Received On 11/09/24  -PP      OT - Next Appointment 11/11/24  -PP      OT Goal Re-Cert Due Date 11/23/24  -PP         Timed Charges    97653 - OT Therapeutic Activity Minutes 11  -PP         Untimed Charges    OT Eval/Re-eval Minutes 10  -PP         Total Minutes    Timed  Charges Total Minutes 11  -PP      Untimed Charges Total Minutes 10  -PP       Total Minutes 21  -PP                User Key  (r) = Recorded By, (t) = Taken By, (c) = Cosigned By      Initials Name Provider Type    PP Susan Beckham, OT Occupational Therapist                  Therapy Charges for Today       Code Description Service Date Service Provider Modifiers Qty    [id number]  OT THERAPEUTIC ACT EA 15 MIN 11/9/2024 Susan Beckham, OT GO 1    [id number]  OT EVAL MOD COMPLEXITY 3 11/9/2024 Susan Beckham, OT GO 1                 Susan Beckham, OT  11/9/2024

## 2024-11-09 NOTE — PLAN OF CARE
Goal Outcome Evaluation:              Outcome Evaluation: Clinical swallow evaluation  completed. Daughter present.        Dysphagia signs: Pt unable to self feed due to UE edema and required assistance. Thin liquid trials  via cup and straw an puree completed without difficulty. Pt ind self limiting bolus size for safe intake. With soft solids, pt with significantly prolonged mastication with moderately impaired bolus formation with all trials and pt consistently reporting bolus would not go down. Mild oral residue post swallow that was cleared with thin liquid via straw wash.                 Assessment / Plan: Recommend initiate diet of puree solids with thin liquids via straw. Recommend medication crushed in puree with thin liquids as tolerated. Oral care BID/PRN.    Anticipated Discharge Disposition (SLP): anticipate therapy at next level of care          SLP Swallowing Diagnosis: mild, oral dysphagia, suspected pharyngeal dysphagia (11/09/24 0900)  Treatment Assessment (SLP): improved (11/09/24 0900)

## 2024-11-09 NOTE — PROGRESS NOTES
Name: Kristyn Lugo ADMIT: 11/3/2024   : 1940  PCP: Sean Fisher MD    MRN: [id number] LOS: 6 days   AGE/SEX: 84 y.o. female  ROOM: [facility]     Subjective   Subjective   Pt tired. Feeling depressed, hopeless. No SI.   Tolerating TFs. No N/V/D/abd pain. No F/C/NS. No SOA or CP. Still coughing some. Making urine.       Objective   Objective   Vital Signs  Temp:  [97.2 °F (36.2 °C)-98.5 °F (36.9 °C)] 98.2 °F (36.8 °C)  Heart Rate:  [83-99] 92  Resp:  [16-23] 20  BP: (132-157)/(73-92) 132/77  SpO2:  [94 %-95 %] 95 %  on  Flow (L/min) (Oxygen Therapy):  [2] 2;   Device (Oxygen Therapy): humidified;nasal cannula  Body mass index is 33.73 kg/m².  Physical Exam  Vitals and nursing note reviewed. Exam conducted with a chaperone present (Dtr).   Constitutional:       General: She is not in acute distress.     Appearance: She is obese. She is ill-appearing. She is not toxic-appearing or diaphoretic.   HENT:      Head: Normocephalic.      Nose: Nose normal.      Comments: Feeding tube right nare     Mouth/Throat:      Mouth: Mucous membranes are moist.      Pharynx: Oropharynx is clear.   Eyes:      General: No scleral icterus.        Right eye: No discharge.         Left eye: No discharge.      Conjunctiva/sclera: Conjunctivae normal.   Cardiovascular:      Rate and Rhythm: Normal rate and regular rhythm.      Pulses: Normal pulses.   Pulmonary:      Effort: Pulmonary effort is normal. No respiratory distress.      Breath sounds: No wheezing or rales.      Comments: Coarse upper airway noise  Abdominal:      General: Bowel sounds are normal. There is no distension.      Palpations: Abdomen is soft.      Tenderness: There is no abdominal tenderness.   Genitourinary:     Comments: Bloody urine in nj bag  Musculoskeletal:         General: Swelling (doughy chronic edema in all 4 extremities) present.      Cervical back: Neck supple.   Skin:     General: Skin is warm and dry.      Capillary Refill:  Capillary refill takes less than 2 seconds.      Coloration: Skin is not jaundiced.   Neurological:      Mental Status: She is alert and oriented to person, place, and time. Mental status is at baseline.      Cranial Nerves: No cranial nerve deficit.      Coordination: Coordination normal.      Comments: [facility]   Psychiatric:         Behavior: Behavior normal.      Comments: Depressed       Results Review     I reviewed the patient's new clinical results.  Results from last 7 days   Lab Units 11/09/24  0634 11/08/24 0441 11/07/24 0450 11/06/24 0443   WBC 10*3/mm3 10.16 8.84 11.80* 19.97*   HEMOGLOBIN g/dL 10.2* 9.4* 9.6* 9.9*   PLATELETS 10*3/mm3 126* 93* 74* 84*     Results from last 7 days   Lab Units 11/08/24 0441 11/07/24 0450 11/06/24 0443 11/05/24  0322   SODIUM mmol/L 148* 142 143 140   POTASSIUM mmol/L 3.4* 3.1* 3.5 3.4*   CHLORIDE mmol/L 111* 108* 111* 108*   CO2 mmol/L 28.0 23.3 18.5* 18.0*   BUN mg/dL 51* 50* 42* 39*   CREATININE mg/dL 1.27* 1.50* 1.70* 1.99*   GLUCOSE mg/dL 143* 173* 124* 118*   EGFR mL/min/1.73 41.5* 34.0* 29.3* 24.2*     Results from last 7 days   Lab Units 11/04/24  1203 11/03/24  1546   ALBUMIN g/dL 2.9* 3.1*   BILIRUBIN mg/dL 0.6 0.4   ALK PHOS U/L 102 53   AST (SGOT) U/L 42* 33*   ALT (SGPT) U/L 22 18     Results from last 7 days   Lab Units 11/08/24 0441 11/07/24 0450 11/06/24 0443 11/05/24  0322 11/04/24  1823 11/04/24  1203 11/04/24  0526 11/03/24  1546   CALCIUM mg/dL 8.1* 8.3* 7.3* 7.1*   < > 7.6* 7.9* 8.4*   ALBUMIN g/dL  --   --   --   --   --  2.9*  --  3.1*   MAGNESIUM mg/dL  --  2.0  --   --   --   --  2.0 1.5*   PHOSPHORUS mg/dL  --  2.7  --   --   --   --  3.2  --     < > = values in this interval not displayed.     Results from last 7 days   Lab Units 11/05/24  0322 11/04/24  1823 11/04/24  1203 11/04/24  0349 11/03/24  1800 11/03/24  1546   PROCALCITONIN ng/mL  --   --   --   --   --  93.60*   LACTATE mmol/L 1.6 2.1* 2.2* 2.4*   < > 4.9*    < > = values in this  interval not displayed.     Glucose   Date/Time Value Ref Range Status   11/09/2024 1129 130 70 - 130 mg/dL Final   11/09/2024 0622 136 (H) 70 - 130 mg/dL Final   11/08/2024 2319 151 (H) 70 - 130 mg/dL Final   11/08/2024 1810 136 (H) 70 - 130 mg/dL Final   11/08/2024 1236 142 (H) 70 - 130 mg/dL Final   11/08/2024 0002 137 (H) 70 - 130 mg/dL Final   11/07/2024 1615 140 (H) 70 - 130 mg/dL Final       No radiology results for the last day    I have personally reviewed all medications:  Scheduled Medications  aspirin, 81 mg, Nasogastric, Daily  cefTRIAXone, 2,000 mg, Intravenous, Q24H  chlorhexidine, 15 mL, Mouth/Throat, Q12H  folic acid, 400 mcg, Nasogastric, Daily  ipratropium-albuterol, 3 mL, Nebulization, BID  lansoprazole, 30 mg, Nasogastric, QAM AC  sodium chloride, 10 mL, Intravenous, Q12H  sodium chloride, 10 mL, Intravenous, Q12H  sodium chloride, 10 mL, Intravenous, Q12H  sodium chloride, 10 mL, Intravenous, Q12H  sodium chloride, 10 mL, Intravenous, Q12H    Infusions   Diet  Diet: Regular/House; Feeding Assistance - Nursing; Texture: Pureed (NDD 1); Fluid Consistency: Thin (IDDSI 0)    I have personally reviewed:  [x]  Laboratory   [x]  Microbiology   [x]  Radiology   [x]  EKG/Telemetry  [x]  Cardiology/Vascular   []  Pathology    [x]  Records       Assessment/Plan     Active Hospital Problems    Diagnosis  POA    **Acute pyelonephritis [N10]  Yes    Primary hypertension [I10]  Yes    HLD (hyperlipidemia) [E78.5]  Yes    Chronic diastolic CHF (congestive heart failure) [I50.32]  Yes    Irritable 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 [K58.2]  Yes    Hydronephrosis of right kidney [N13.30]  Yes    Right ureteral stone [N20.1]  Yes    Acute respiratory failure with hypoxia [J96.01]  No    Thrombocytopenia [D69.6]  Yes    Bacteremia due to Klebsiella pneumoniae [R78.81, B96.1]  Yes    Shock, septic [A41.9, R65.21]  Yes    EMMA (acute kidney injury) [N17.9]  Yes    Stage 3a chronic kidney disease  [N18.31]  Yes    Hypernatremia [E87.0]  Yes    Superficial thrombophlebitis of left upper extremity [I80.8]  No    Hypokalemia [E87.6]  No      Resolved Hospital Problems   No resolved problems to display.       83yo woman with CKD3a, HTN, HLD, chronic diastolic CHF, IBS, and aortic aneurysm, who was admitted to CCU by Intensivist with septic shock due to acute right sided pyelonephritis complicated by right ureteral stone/obstruction. She required mechanical ventilation and pressors. We were asked to assume care when pt came out of CCU.    Acute right pyelonephritis  Right ureteral stone/obstruction  S/p cysto with right ureteral stent placement on 11/3 by Dr. Ledesma  Still has nj  Hematuria not unusual given stent  Monitor for resolution, monitor Hgb  F/u with  in 2 weeks  Continue abx as below    Septic shock  Klebsiella bacteremia  Consulted ID  They recommend abx through 11/12  Currently on IV Rocephin, can change to Augmentin if dc'd    EMMA/CKD3a  Renal following  Cr improving    Hypernatremia  Free water with TFs    Hypokalemia  Renal replacing    Thrombocytopenia  Due to sepsis  Improved today  Monitor    Chronic diastolic CHF  Type 2 NSTEMI  Mod aortic insufficiency  LAFB  Ascending aortic aneurysm  Card has followed and signed off--nothing further to add    IBS  Monitor bowel function closely    Acute resp failure with hypoxia  Weaning O2 as able  Continue scheduled DuoNebs    LUE superficial thrombophlebitis  Supportive care and ASA    Dysphagia  Currently on TFs  Diet started today per SLP recs      SCDs for DVT prophylaxis.  Full code.  Discussed with patient, RN, and dtr.  Anticipate discharge to SNU facility, timing yet to be determined.  Expected Discharge Date: 11/11/2024; Expected Discharge Time:  3:00 PM      Anish Wills MD  [facility]  11/09/24  17:06 EST

## 2024-11-09 NOTE — PROGRESS NOTES
Dr. OBDULIO Siegel    [facility] CARE        Patient ID:  Name:  Kristyn Lugo  MRN:  [id number]  1940  84 y.o.  female            CC/Reason for visit: follow up septic shock with acute pyelonephritis     Interval hx: Patient complains of some wheezing.  Coughing and still requiring some oxygen.  Discussed with nurses at the bedside.  Reviewed notes by all other medical providers.  Patient here for hydronephrosis, nephrolithiasis and Klebsiella bacteremia with sepsis due to UTI.  Patient receiving tube feeds.  Needs repeat swallow evaluation    ROS: Positive for cough and wheezing.  No shortness of breath.  No abdominal pain    I reviewed old medical records.  Past medical history, social history and family history: Unchanged from admission H&P.      Vitals:  Vitals:    11/08/24 2318 11/09/24 0450 11/09/24 0700 11/09/24 1100   BP: 148/76 151/73 149/78 153/82   BP Location: Left arm Left arm Right arm Right arm   Patient Position: Lying Lying     Pulse: 88 83 98 99   Resp: 16 18 23 22   Temp: 98.5 °F (36.9 °C) 98.1 °F (36.7 °C) 98.1 °F (36.7 °C) 97.2 °F (36.2 °C)   TempSrc: Oral Oral Oral Oral   SpO2: 94% 95%     Weight:       Height:         FiO2 (%): 99 %     Body mass index is 33.73 kg/m².    Intake/Output Summary (Last 24 hours) at 11/9/2024 1340  Last data filed at 11/8/2024 2340  Gross per 24 hour   Intake 1786 ml   Output 1000 ml   Net 786 ml       Exam:  GEN:  No distress  Alert, oriented x 3.  Follows commands with all 4 extremities without focal deficits throughout  LUNGS: Scattered rhonchi bilat, no use of accessory muscles  CV:  Normal S1S2, without murmur, no edema  ABD:  Non tender, no enlarged liver or masses      Scheduled meds:  aspirin, 81 mg, Nasogastric, Daily  cefTRIAXone, 2,000 mg, Intravenous, Q24H  chlorhexidine, 15 mL, Mouth/Throat, Q12H  folic acid, 400 mcg, Nasogastric, Daily  ipratropium-albuterol, 3 mL, Nebulization, BID  lansoprazole, 30 mg, Nasogastric,  QAM AC  sodium chloride, 10 mL, Intravenous, Q12H  sodium chloride, 10 mL, Intravenous, Q12H  sodium chloride, 10 mL, Intravenous, Q12H  sodium chloride, 10 mL, Intravenous, Q12H  sodium chloride, 10 mL, Intravenous, Q12H      IV meds:                           Data Review:   I reviewed the patient's medications and new clinical results.         Results from last 7 days   Lab Units 11/09/24  0634 11/08/24  0441 11/07/24  0450 11/06/24  0443 11/04/24  1823 11/04/24  1203 11/04/24  0526 11/03/24  1546   SODIUM mmol/L  --  148* 142 143   < > 139   < > 140   POTASSIUM mmol/L  --  3.4* 3.1* 3.5   < > 3.8   < > 3.6   CHLORIDE mmol/L  --  111* 108* 111*   < > 109*   < > 107   CO2 mmol/L  --  28.0 23.3 18.5*   < > 15.5*   < > 19.9*   BUN mg/dL  --  51* 50* 42*   < > 37*   < > 30*   CREATININE mg/dL  --  1.27* 1.50* 1.70*   < > 2.54*   < > 2.17*   CALCIUM mg/dL  --  8.1* 8.3* 7.3*   < > 7.6*   < > 8.4*   BILIRUBIN mg/dL  --   --   --   --   --  0.6  --  0.4   ALK PHOS U/L  --   --   --   --   --  102  --  53   ALT (SGPT) U/L  --   --   --   --   --  22  --  18   AST (SGOT) U/L  --   --   --   --   --  42*  --  33*   GLUCOSE mg/dL  --  143* 173* 124*   < > 136*   < > 97   WBC 10*3/mm3 10.16 8.84 11.80* 19.97*   < > 35.25*   < > 17.23*   HEMOGLOBIN g/dL 10.2* 9.4* 9.6* 9.9*   < > 11.7*   < > 10.9*   PLATELETS 10*3/mm3 126* 93* 74* 84*   < > 156   < > 158   INR   --   --   --   --   --   --   --  1.24*   PROBNP pg/mL  --   --   --   --   --   --   --  5,785.0*   PROCALCITONIN ng/mL  --   --   --   --   --   --   --  93.60*    < > = values in this interval not displayed.     Results from last 7 days   Lab Units 11/03/24  1805 11/03/24  1644   BLOODCX  No growth at 5 days Klebsiella pneumoniae ssp pneumoniae*   BCIDPCR   --  Klebsiella pneumoniae group. Identification by BCID2 PCR.*         Results from last 7 days   Lab Units 11/04/24  1203 11/03/24  1800 11/03/24  1546   HSTROP T ng/L 268* 230* 274*     Results from last 7 days    Lab Units 11/07/24  0945 11/07/24  0333 11/06/24  1138 11/06/24  0440 11/05/24  0703 11/05/24  0334 11/04/24  1207 11/03/24  2248 11/03/24  1558   PH, ARTERIAL pH units 7.405 7.395 7.396 7.305* 7.364 7.322* 7.285*   < > 7.396   PCO2, ARTERIAL mm Hg 42.9 38.9 34.1* 40.8 35.3 38.2 36.8   < > 29.6*   PO2 ART mm Hg 73.0* 80.1 59.7* 74.0* 83.9 80.1 107.3*   < > 88.1   O2 SATURATION ART % 94.5 95.7 90.6* 93.2 96.0 94.8 97.5   < > 96.9   FLOW RATE lpm  --   --   --   --   --   --   --   --  3.0000   MODALITY  Adult Vent Adult Vent Adult Vent Adult Vent Adult Vent Adult Vent Adult Vent   < > Cannula    < > = values in this interval not displayed.            ASSESSMENT:   Right hydronephrosis with nephrolithiasis  Right distal ureteral calculi s/p cysto with stent insertion 11/3/24  EMMA  Sepsis vs hypovolemic shock  Hypotension  Elevated troponin  Elevated proBNP  Acute hypoxemic respiratory failure  Valvular heart disease  Chronic diastolic heart failure  Anemia  Chronic constipation vs diarrhea  Hx of HTN  Aneurysm of ascending aorta  HLD  Thrombocytopenia consumptive secondary to sepsis continue to monitor observe for any signs of active bleeding  Klebseilla pneumoniae bacteremia      PLAN:  Patient and all problems new to me during this hospital stay.  Continue IV antibiotics.  Appreciate input from infectious diseases, Dr. Ramesh.  Has right-sided ureteral stent, seen by urology.  They recommend intravenous ceftriaxone for 10 days, and if discharged, transition to Augmentin orally.  Start some nebulized beta agonist bronchodilators due to her cough, congestion and some wheezing.  Continue monitoring thrombocytopenia with daily CBC.  It has improved.  No signs of bleeding.  Repeat swallow evaluation soon in order to decide whether patient still needs ongoing NG tube for feeding.    This patient has several chronic medical conditions, and now several acute medical illnesses.  Extensive amount of data was reviewed.   Images were directly visualized by me.  This patient warrants high complexity medical decision-making.      I reviewed the chart and other providers notes and reviewed labs.  Copied text in this note has been reviewed and is accurate as of today      Madhu Siegel MD  11/9/2024

## 2024-11-09 NOTE — NURSING NOTE
Access center note.    Unable to complete Access consult at this time, patient unavailable. Primary RN request Access check back.

## 2024-11-10 LAB
ALBUMIN SERPL-MCNC: 2.7 G/DL (ref 3.5–5.2)
ALP SERPL-CCNC: 120 U/L (ref 39–117)
ALT SERPL W P-5'-P-CCNC: 13 U/L (ref 1–33)
ANION GAP SERPL CALCULATED.3IONS-SCNC: 7.3 MMOL/L (ref 5–15)
AST SERPL-CCNC: 13 U/L (ref 1–32)
BILIRUB CONJ SERPL-MCNC: <0.2 MG/DL (ref 0–0.3)
BILIRUB INDIRECT SERPL-MCNC: ABNORMAL MG/DL
BILIRUB SERPL-MCNC: <0.2 MG/DL (ref 0–1.2)
BUN SERPL-MCNC: 37 MG/DL (ref 8–23)
BUN/CREAT SERPL: 43 (ref 7–25)
CALCIUM SPEC-SCNC: 8.6 MG/DL (ref 8.6–10.5)
CHLORIDE SERPL-SCNC: 107 MMOL/L (ref 98–107)
CO2 SERPL-SCNC: 28.7 MMOL/L (ref 22–29)
CREAT SERPL-MCNC: 0.86 MG/DL (ref 0.57–1)
DEPRECATED RDW RBC AUTO: 44.1 FL (ref 37–54)
EGFRCR SERPLBLD CKD-EPI 2021: 66.3 ML/MIN/1.73
ERYTHROCYTE [DISTWIDTH] IN BLOOD BY AUTOMATED COUNT: 13.7 % (ref 12.3–15.4)
GLUCOSE BLDC GLUCOMTR-MCNC: 116 MG/DL (ref 70–130)
GLUCOSE BLDC GLUCOMTR-MCNC: 139 MG/DL (ref 70–130)
GLUCOSE BLDC GLUCOMTR-MCNC: 157 MG/DL (ref 70–130)
GLUCOSE SERPL-MCNC: 144 MG/DL (ref 65–99)
HCT VFR BLD AUTO: 30.2 % (ref 34–46.6)
HGB BLD-MCNC: 9.5 G/DL (ref 12–15.9)
MAGNESIUM SERPL-MCNC: 1.8 MG/DL (ref 1.6–2.4)
MCH RBC QN AUTO: 28.1 PG (ref 26.6–33)
MCHC RBC AUTO-ENTMCNC: 31.5 G/DL (ref 31.5–35.7)
MCV RBC AUTO: 89.3 FL (ref 79–97)
PHOSPHATE SERPL-MCNC: 2.7 MG/DL (ref 2.5–4.5)
PLATELET # BLD AUTO: 125 10*3/MM3 (ref 140–450)
PMV BLD AUTO: 11.9 FL (ref 6–12)
POTASSIUM SERPL-SCNC: 4.4 MMOL/L (ref 3.5–5.2)
PROT SERPL-MCNC: 5 G/DL (ref 6–8.5)
RBC # BLD AUTO: 3.38 10*6/MM3 (ref 3.77–5.28)
SODIUM SERPL-SCNC: 143 MMOL/L (ref 136–145)
URATE SERPL-MCNC: 5 MG/DL (ref 2.4–5.7)
WBC NRBC COR # BLD AUTO: 9.81 10*3/MM3 (ref 3.4–10.8)

## 2024-11-10 PROCEDURE — 85027 COMPLETE CBC AUTOMATED: CPT | Performed by: INTERNAL MEDICINE

## 2024-11-10 PROCEDURE — 84100 ASSAY OF PHOSPHORUS: CPT | Performed by: HOSPITALIST

## 2024-11-10 PROCEDURE — 84550 ASSAY OF BLOOD/URIC ACID: CPT | Performed by: HOSPITALIST

## 2024-11-10 PROCEDURE — 94761 N-INVAS EAR/PLS OXIMETRY MLT: CPT

## 2024-11-10 PROCEDURE — 83735 ASSAY OF MAGNESIUM: CPT | Performed by: HOSPITALIST

## 2024-11-10 PROCEDURE — 94799 UNLISTED PULMONARY SVC/PX: CPT

## 2024-11-10 PROCEDURE — 90791 PSYCH DIAGNOSTIC EVALUATION: CPT

## 2024-11-10 PROCEDURE — 25010000002 ONDANSETRON PER 1 MG

## 2024-11-10 PROCEDURE — 80048 BASIC METABOLIC PNL TOTAL CA: CPT | Performed by: INTERNAL MEDICINE

## 2024-11-10 PROCEDURE — 82948 REAGENT STRIP/BLOOD GLUCOSE: CPT

## 2024-11-10 PROCEDURE — 25010000002 CEFTRIAXONE PER 250 MG: Performed by: INTERNAL MEDICINE

## 2024-11-10 PROCEDURE — 94664 DEMO&/EVAL PT USE INHALER: CPT

## 2024-11-10 PROCEDURE — 80076 HEPATIC FUNCTION PANEL: CPT | Performed by: HOSPITALIST

## 2024-11-10 RX ADMIN — ASPIRIN 81 MG: 81 TABLET, CHEWABLE ORAL at 08:23

## 2024-11-10 RX ADMIN — LANSOPRAZOLE 30 MG: 15 TABLET, ORALLY DISINTEGRATING ORAL at 08:23

## 2024-11-10 RX ADMIN — TRAMADOL HYDROCHLORIDE 50 MG: 50 TABLET, FILM COATED ORAL at 00:50

## 2024-11-10 RX ADMIN — TRAMADOL HYDROCHLORIDE 50 MG: 50 TABLET, FILM COATED ORAL at 21:45

## 2024-11-10 RX ADMIN — IPRATROPIUM BROMIDE AND ALBUTEROL SULFATE 3 ML: 2.5; .5 SOLUTION RESPIRATORY (INHALATION) at 20:10

## 2024-11-10 RX ADMIN — ACETAMINOPHEN 325MG 650 MG: 325 TABLET ORAL at 00:45

## 2024-11-10 RX ADMIN — Medication 400 MCG: at 08:23

## 2024-11-10 RX ADMIN — ONDANSETRON 4 MG: 2 INJECTION, SOLUTION INTRAMUSCULAR; INTRAVENOUS at 14:53

## 2024-11-10 RX ADMIN — CEFTRIAXONE 2000 MG: 2 INJECTION, POWDER, FOR SOLUTION INTRAMUSCULAR; INTRAVENOUS at 18:27

## 2024-11-10 RX ADMIN — Medication 10 ML: at 20:11

## 2024-11-10 RX ADMIN — Medication 10 ML: at 08:23

## 2024-11-10 RX ADMIN — TRAMADOL HYDROCHLORIDE 50 MG: 50 TABLET, FILM COATED ORAL at 08:23

## 2024-11-10 RX ADMIN — TRAMADOL HYDROCHLORIDE 50 MG: 50 TABLET, FILM COATED ORAL at 14:44

## 2024-11-10 RX ADMIN — IPRATROPIUM BROMIDE AND ALBUTEROL SULFATE 3 ML: 2.5; .5 SOLUTION RESPIRATORY (INHALATION) at 07:39

## 2024-11-10 NOTE — PROGRESS NOTES
Dr. OBDULIO Siegel    [facility] CORONARY CARE        Patient ID:  Name:  Kristyn Lugo  MRN:  [id number]  1940  84 y.o.  female            CC/Reason for visit: follow up septic shock with acute pyelonephritis     Interval hx: Patient less short of breath.  Still requiring oxygen.  Still has a cough and some congestion    ROS: No chest pain.  No abdominal pain    Vitals:  Vitals:    11/10/24 0717 11/10/24 0739 11/10/24 0741 11/10/24 1100   BP: 126/68   121/68   BP Location:    Right arm   Pulse: 88 92 101 87   Resp:  20  19   Temp:    99 °F (37.2 °C)   TempSrc:    Oral   SpO2: 97% 99% 100%    Weight:       Height:         FiO2 (%): 99 %     Body mass index is 33.73 kg/m².    Intake/Output Summary (Last 24 hours) at 11/10/2024 1302  Last data filed at 11/10/2024 0506  Gross per 24 hour   Intake 1920 ml   Output 2000 ml   Net -80 ml       Exam:  GEN:  No distress  Alert, oriented x 3.   LUNGS: Clear breath sounds bilat, no use of accessory muscles  CV:  Normal S1S2, without murmur, no edema  ABD:  Non tender, no enlarged liver or masses      Scheduled meds:  aspirin, 81 mg, Nasogastric, Daily  cefTRIAXone, 2,000 mg, Intravenous, Q24H  folic acid, 400 mcg, Nasogastric, Daily  ipratropium-albuterol, 3 mL, Nebulization, BID  lansoprazole, 30 mg, Nasogastric, QAM AC  sodium chloride, 10 mL, Intravenous, Q12H      IV meds:                           Data Review:   I reviewed the patient's medications and new clinical results.           Results from last 7 days   Lab Units 11/10/24  0459 11/09/24  0634 11/08/24  0441 11/07/24  0450 11/04/24  1823 11/04/24  1203 11/04/24  0526 11/03/24  1546   SODIUM mmol/L 143  --  148* 142   < > 139   < > 140   POTASSIUM mmol/L 4.4  --  3.4* 3.1*   < > 3.8   < > 3.6   CHLORIDE mmol/L 107  --  111* 108*   < > 109*   < > 107   CO2 mmol/L 28.7  --  28.0 23.3   < > 15.5*   < > 19.9*   BUN mg/dL 37*  --  51* 50*   < > 37*   < > 30*   CREATININE mg/dL 0.86  --  1.27*  1.50*   < > 2.54*   < > 2.17*   CALCIUM mg/dL 8.6  --  8.1* 8.3*   < > 7.6*   < > 8.4*   BILIRUBIN mg/dL <0.2  --   --   --   --  0.6  --  0.4   ALK PHOS U/L 120*  --   --   --   --  102  --  53   ALT (SGPT) U/L 13  --   --   --   --  22  --  18   AST (SGOT) U/L 13  --   --   --   --  42*  --  33*   GLUCOSE mg/dL 144*  --  143* 173*   < > 136*   < > 97   WBC 10*3/mm3 9.81 10.16 8.84 11.80*   < > 35.25*   < > 17.23*   HEMOGLOBIN g/dL 9.5* 10.2* 9.4* 9.6*   < > 11.7*   < > 10.9*   PLATELETS 10*3/mm3 125* 126* 93* 74*   < > 156   < > 158   INR   --   --   --   --   --   --   --  1.24*   PROBNP pg/mL  --   --   --   --   --   --   --  5,785.0*   PROCALCITONIN ng/mL  --   --   --   --   --   --   --  93.60*    < > = values in this interval not displayed.     Results from last 7 days   Lab Units 11/03/24  1805 11/03/24  1644   BLOODCX  No growth at 5 days Klebsiella pneumoniae ssp pneumoniae*   BCIDPCR   --  Klebsiella pneumoniae group. Identification by BCID2 PCR.*         Results from last 7 days   Lab Units 11/04/24  1203 11/03/24  1800 11/03/24  1546   HSTROP T ng/L 268* 230* 274*     Results from last 7 days   Lab Units 11/07/24  0945 11/07/24  0333 11/06/24  1138 11/06/24  0440 11/05/24  0703 11/05/24  0334 11/04/24  1207 11/03/24  2248 11/03/24  1558   PH, ARTERIAL pH units 7.405 7.395 7.396 7.305* 7.364 7.322* 7.285*   < > 7.396   PCO2, ARTERIAL mm Hg 42.9 38.9 34.1* 40.8 35.3 38.2 36.8   < > 29.6*   PO2 ART mm Hg 73.0* 80.1 59.7* 74.0* 83.9 80.1 107.3*   < > 88.1   O2 SATURATION ART % 94.5 95.7 90.6* 93.2 96.0 94.8 97.5   < > 96.9   FLOW RATE lpm  --   --   --   --   --   --   --   --  3.0000   MODALITY  Adult Vent Adult Vent Adult Vent Adult Vent Adult Vent Adult Vent Adult Vent   < > Cannula    < > = values in this interval not displayed.          ASSESSMENT:   Right pleural effusion  Right hydronephrosis with nephrolithiasis  Right distal ureteral calculi s/p cysto with stent insertion 11/3/24  EMMA  Sepsis vs  hypovolemic shock  Hypotension  Elevated troponin  Elevated proBNP  Acute hypoxemic respiratory failure  Valvular heart disease  Chronic diastolic heart failure  Anemia  Chronic constipation vs diarrhea  Hx of HTN  Aneurysm of ascending aorta  HLD  Thrombocytopenia consumptive secondary to sepsis continue to monitor observe for any signs of active bleeding  Klebse[place] pneumoniae bacteremia      PLAN:  Will order ultrasound-guided thoracentesis of right pleural effusion tomorrow in order to speed up recovery of her respiratory distress.  Was extubated but chest x-ray images reviewed today, still showing moderate size right pleural effusion and patient still requiring some oxygen.  Diuretics may take too long to mobilize the effusion.    Now telemetry status, [facility] following as primary admitting team      Madhu Siegel MD  11/10/2024

## 2024-11-10 NOTE — PROGRESS NOTES
PROGRESS NOTE      Patient Name: Kristyn Lugo  : 1940  MRN: [id number]  Primary Care Physician: Sean Fisher MD  Date of admission: 11/3/2024    Patient Care Team:  Sean Fisher MD as PCP - General (Internal Medicine)        Subjective   Subjective:     Patient seen and examined, no new issues, no distress  UOP 2L    Review of systems:  All ROS negative      Allergies:    Allergies   Allergen Reactions    Fenofibrate Unknown - High Severity     Pt is unaware    Penicillins Rash     caused a rash in the 1960s; she says she has tolerated amoxicillin or Augmentin since that time.       Objective   Exam:     Vital Signs  Temp:  [97.7 °F (36.5 °C)-99 °F (37.2 °C)] 98.4 °F (36.9 °C)  Heart Rate:  [] 101  Resp:  [18-28] 20  BP: (126-174)/(67-77) 126/68  SpO2:  [94 %-100 %] 100 %  on  Flow (L/min) (Oxygen Therapy):  [2] 2;   Device (Oxygen Therapy): humidified;nasal cannula  Body mass index is 33.73 kg/m².    General:  elderly  female intubated sedated.    Head:      Normocephalic and atraumatic.    Eyes:      PERRL/EOM intact, conjunctivae and sclerae clear without nystagmus.    Neck:      No masses, thyromegaly,  trachea central with normal respiratory effort   Lungs:    Clear bilaterally to auscultation.    Heart:      Regular rate and rhythm, no murmur no gallop  Abd:        Soft, nontender, not distended, bowel sounds positive, no shifting dullness   Pulses:   Pulses palpable  Extr:        No cyanosis or clubbing--mild edema.    Neuro:    sedated  Skin:       Intact without lesions or rashes.    Psych:    Alert and cooperative; normal mood and affect; .      Results Review:  I have personally reviewed most recent Data :  CBC    Results from last 7 days   Lab Units 11/10/24  0459 24  0634 24  0441 24  0450 24  0443 24  0322 24  1203   WBC 10*3/mm3 9.81 10.16 8.84 11.80* 19.97* 27.79* 35.25*   HEMOGLOBIN g/dL 9.5* 10.2* 9.4* 9.6* 9.9* 10.7* 11.7*    PLATELETS 10*3/mm3 125* 126* 93* 74* 84* 103* 156     CMP   Results from last 7 days   Lab Units 11/10/24  0459 11/08/24  0441 11/07/24  0450 11/06/24  0443 11/05/24  0322 11/04/24  1823 11/04/24  1203 11/04/24  0526 11/03/24  1546   SODIUM mmol/L 143 148* 142 143 140 144 139   < > 140   POTASSIUM mmol/L 4.4 3.4* 3.1* 3.5 3.4* 3.7 3.8   < > 3.6   CHLORIDE mmol/L 107 111* 108* 111* 108* 109* 109*   < > 107   CO2 mmol/L 28.7 28.0 23.3 18.5* 18.0* 17.5* 15.5*   < > 19.9*   BUN mg/dL 37* 51* 50* 42* 39* 39* 37*   < > 30*   CREATININE mg/dL 0.86 1.27* 1.50* 1.70* 1.99* 2.15* 2.54*   < > 2.17*   GLUCOSE mg/dL 144* 143* 173* 124* 118* 128* 136*   < > 97   ALBUMIN g/dL 2.7*  --   --   --   --   --  2.9*  --  3.1*   BILIRUBIN mg/dL <0.2  --   --   --   --   --  0.6  --  0.4   ALK PHOS U/L 120*  --   --   --   --   --  102  --  53   AST (SGOT) U/L 13  --   --   --   --   --  42*  --  33*   ALT (SGPT) U/L 13  --   --   --   --   --  22  --  18    < > = values in this interval not displayed.     ABG    Results from last 7 days   Lab Units 11/07/24  0945 11/07/24  0333 11/06/24  1138 11/06/24  0440 11/05/24  0703 11/05/24  0334 11/04/24  1207   PH, ARTERIAL pH units 7.405 7.395 7.396 7.305* 7.364 7.322* 7.285*   PCO2, ARTERIAL mm Hg 42.9 38.9 34.1* 40.8 35.3 38.2 36.8   PO2 ART mm Hg 73.0* 80.1 59.7* 74.0* 83.9 80.1 107.3*   O2 SATURATION ART % 94.5 95.7 90.6* 93.2 96.0 94.8 97.5   BASE EXCESS ART mmol/L 1.9 -0.9* -3.5* -5.7* -4.7* -5.8* -8.5*     No radiology results for the last day    Results for orders placed during the hospital encounter of 11/03/24    Adult Transthoracic Echo Complete W/ Cont if Necessary Per Protocol    Interpretation Summary    Left ventricular systolic function is normal. Calculated left ventricular EF = 52.4%    Left ventricular wall thickness is consistent with mild septal asymmetric hypertrophy.    Left ventricular diastolic function is consistent with age.    Mild aortic valve stenosis is  present.    Peak velocity of the flow distal to the aortic valve is 251 cm/s. Aortic valve maximum pressure gradient is 25 mmHg. Aortic valve mean pressure gradient is 14 mmHg. Aortic valve dimensionless index is 0.6 .    Mild mitral valve stenosis is present. The mitral valve mean gradient is 5 mmHg.    Estimated right ventricular systolic pressure from tricuspid regurgitation is normal (<35 mmHg).    Scheduled Meds:aspirin, 81 mg, Nasogastric, Daily  cefTRIAXone, 2,000 mg, Intravenous, Q24H  folic acid, 400 mcg, Nasogastric, Daily  ipratropium-albuterol, 3 mL, Nebulization, BID  lansoprazole, 30 mg, Nasogastric, QAM AC  sodium chloride, 10 mL, Intravenous, Q12H      Continuous Infusions:     PRN Meds:  acetaminophen **OR** acetaminophen    HYDROcodone-acetaminophen    nitroglycerin    ondansetron    sodium chloride    sodium chloride    sodium chloride    sodium chloride    sodium chloride    traMADol    Assessment & Plan   Assessment and Plan:         Acute pyelonephritis    Primary hypertension    HLD (hyperlipidemia)    Chronic diastolic CHF (congestive heart failure)    Irritable 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    Hydronephrosis of right kidney    Right ureteral stone    Acute respiratory failure with hypoxia    Thrombocytopenia    Bacteremia due to Klebsiella pneumoniae    Shock, septic    EMMA (acute kidney injury)    Stage 3a chronic kidney disease    Hypernatremia    Superficial thrombophlebitis of left upper extremity    Hypokalemia    ASSESSMENT:  Acute kidney injury  Chronic kidney disease, stage 3, with baseline creatinine roughly 1.0mg/dL; felt to be secondary to hypertension and advanced age  Metabolic acidosis  Hypokalemia  Right hydronephrosis with nephrolithiasis  Right distal ureteral calculi, s/p cysto with stent insertion 11/3/24  Sepsis  Acute hypoxemic respiratory failure  Chronic diastolic heart failure  Hypernatremia     PLAN :     Renal function improved and near  baseline  CXR 11/8 with more opacities/infiltrates, no pleural effusion  Hypernatremia, improved, on free water. Continue for now  EMMA most likely multifactorial and secondary to hemodynamic insults, sepsis and right hydronephrosis, most likely has ATN  Making urine. Urine studies c/w prerenal picture   Proteinuria 1.5 gm f/u with SPEP  Supplement electrolytes as per protocol  Continue to follow-up with the volume status closely  Keep MAP>65  Antibiotics as per primary team. Currently on Rocephin  Will request strict I/Os, daily labs  Discussed in detail with ELENA Hall kidney consultant  [phone]  11/10/2024  11:26 EST

## 2024-11-10 NOTE — PROGRESS NOTES
[facility] consult d/t depression; this writer reviewed chart and contacted ELENA Thompson. Pt is resting and would like to rest tonight; [facility] to attempt consult again tomorrow.

## 2024-11-10 NOTE — PLAN OF CARE
Problem: Fall Injury Risk  Goal: Absence of Fall and Fall-Related Injury  Outcome: Progressing  Intervention: Identify and Manage Contributors  Recent Flowsheet Documentation  Taken 11/10/2024 0600 by Kamila Landaverde RN  Medication Review/Management: medications reviewed  Taken 11/10/2024 0400 by Kamila Landaverde RN  Medication Review/Management: medications reviewed  Taken 11/10/2024 0200 by Kamila Landaverde RN  Medication Review/Management: medications reviewed  Taken 11/10/2024 0000 by Kamila Landaverde RN  Medication Review/Management: medications reviewed  Taken 11/9/2024 2200 by Kamila Landaverde RN  Medication Review/Management: medications reviewed  Taken 11/9/2024 2000 by Kamila Landaverde RN  Medication Review/Management: medications reviewed  Intervention: Promote Injury-Free Environment  Recent Flowsheet Documentation  Taken 11/10/2024 0600 by Kamila Landaverde RN  Safety Promotion/Fall Prevention:   activity supervised   lighting adjusted   nonskid shoes/slippers when out of bed   safety round/check completed   room organization consistent  Taken 11/10/2024 0400 by Kamila Landaverde RN  Safety Promotion/Fall Prevention:   activity supervised   lighting adjusted   nonskid shoes/slippers when out of bed   safety round/check completed   room organization consistent  Taken 11/10/2024 0200 by Kamila Landaverde RN  Safety Promotion/Fall Prevention:   activity supervised   lighting adjusted   nonskid shoes/slippers when out of bed   safety round/check completed   room organization consistent  Taken 11/10/2024 0000 by Kamila Landaverde RN  Safety Promotion/Fall Prevention:   activity supervised   lighting adjusted   nonskid shoes/slippers when out of bed   safety round/check completed   room organization consistent  Taken 11/9/2024 2200 by Kamila Landaverde RN  Safety Promotion/Fall Prevention:   activity supervised   lighting adjusted   nonskid shoes/slippers when out of bed   safety round/check completed   room  organization consistent  Taken 11/9/2024 2000 by Kamila Landaverde RN  Safety Promotion/Fall Prevention:   activity supervised   lighting adjusted   nonskid shoes/slippers when out of bed   safety round/check completed   room organization consistent     Problem: Mechanical Ventilation Invasive  Goal: Effective Communication  Outcome: Met  Intervention: Ensure Effective Communication  Recent Flowsheet Documentation  Taken 11/10/2024 0200 by Kamila Landaverde RN  Trust Relationship/Rapport:   care explained   choices provided   thoughts/feelings acknowledged  Diversional Activities: television  Taken 11/9/2024 2000 by Kamila Landaverde RN  Trust Relationship/Rapport:   care explained   choices provided   thoughts/feelings acknowledged  Diversional Activities: television  Goal: Optimal Device Function  Outcome: Met  Intervention: Optimize Device Care and Function  Recent Flowsheet Documentation  Taken 11/10/2024 0600 by Kamila Landaverde RN  Airway Safety Measures:   oxygen flowmeter at bedside   suction at bedside   manual resuscitator/mask at bedside  Taken 11/10/2024 0400 by Kamila Landaverde RN  Airway Safety Measures:   oxygen flowmeter at bedside   suction at bedside   manual resuscitator/mask at bedside  Taken 11/10/2024 0200 by Kamila Landaverde RN  Airway Safety Measures:   oxygen flowmeter at bedside   suction at bedside   manual resuscitator/mask at bedside  Taken 11/10/2024 0000 by Kamila Landaverde RN  Airway Safety Measures:   oxygen flowmeter at bedside   suction at bedside   manual resuscitator/mask at bedside  Taken 11/9/2024 2200 by Kamila Landaverde RN  Airway Safety Measures:   oxygen flowmeter at bedside   suction at bedside   manual resuscitator/mask at bedside  Taken 11/9/2024 2000 by Kamila Landaverde RN  Airway Safety Measures:   oxygen flowmeter at bedside   suction at bedside   manual resuscitator/mask at bedside  Goal: Mechanical Ventilation Liberation  Outcome: Met  Intervention: Promote Extubation  and Mechanical Ventilation Liberation  Recent Flowsheet Documentation  Taken 11/10/2024 0600 by Kamila Landaverde RN  Medication Review/Management: medications reviewed  Taken 11/10/2024 0400 by Kamila Landaverde RN  Medication Review/Management: medications reviewed  Taken 11/10/2024 0200 by Kamila Landaverde RN  Medication Review/Management: medications reviewed  Taken 11/10/2024 0000 by Kamila Landaverde RN  Medication Review/Management: medications reviewed  Taken 11/9/2024 2200 by Kamila Landaverde RN  Medication Review/Management: medications reviewed  Taken 11/9/2024 2000 by Kamila Landaverde RN  Medication Review/Management: medications reviewed     Problem: Mechanical Ventilation Invasive  Goal: Mechanical Ventilation Liberation  Outcome: Met  Intervention: Promote Extubation and Mechanical Ventilation Liberation  Recent Flowsheet Documentation  Taken 11/10/2024 0600 by Kamila Landaverde RN  Medication Review/Management: medications reviewed  Taken 11/10/2024 0400 by Kamila Landaverde RN  Medication Review/Management: medications reviewed  Taken 11/10/2024 0200 by Kamila Landaverde RN  Medication Review/Management: medications reviewed  Taken 11/10/2024 0000 by Kamila Landaverde RN  Medication Review/Management: medications reviewed  Taken 11/9/2024 2200 by Kamila Landaverde RN  Medication Review/Management: medications reviewed  Taken 11/9/2024 2000 by Kamila Lanadverde RN  Medication Review/Management: medications reviewed   Goal Outcome Evaluation:

## 2024-11-10 NOTE — PROGRESS NOTES
Name: Kristyn Lugo ADMIT: 11/3/2024   : 1940  PCP: Sean Fisher MD    MRN: [id number] LOS: 7 days   AGE/SEX: 84 y.o. female  ROOM: [facility]     Subjective   Subjective   Tolerating TFs and some PO. No N/V/D/abd pain. No F/C/NS. No SOA or CP. Still coughing some. Making urine.  Still pretty down--pretty negative.       Objective   Objective   Vital Signs  Temp:  [97.7 °F (36.5 °C)-99 °F (37.2 °C)] 99 °F (37.2 °C)  Heart Rate:  [] 87  Resp:  [18-28] 19  BP: (121-174)/(67-77) 121/68  SpO2:  [94 %-100 %] 100 %  on  Flow (L/min) (Oxygen Therapy):  [2] 2;   Device (Oxygen Therapy): humidified;nasal cannula  Body mass index is 33.73 kg/m².  Physical Exam  Vitals and nursing note reviewed. Exam conducted with a chaperone present (Friend).   Constitutional:       General: She is not in acute distress.     Appearance: She is obese. She is ill-appearing. She is not toxic-appearing or diaphoretic.   HENT:      Head: Normocephalic.      Nose: Nose normal.      Comments: Feeding tube right nare     Mouth/Throat:      Mouth: Mucous membranes are moist.      Pharynx: Oropharynx is clear.   Eyes:      General: No scleral icterus.        Right eye: No discharge.         Left eye: No discharge.      Conjunctiva/sclera: Conjunctivae normal.   Cardiovascular:      Rate and Rhythm: Normal rate and regular rhythm.      Pulses: Normal pulses.   Pulmonary:      Effort: Pulmonary effort is normal. No respiratory distress.      Breath sounds: Normal breath sounds. No wheezing or rales.      Comments: Anteriorly   Abdominal:      General: Bowel sounds are normal. There is no distension.      Palpations: Abdomen is soft.      Tenderness: There is no abdominal tenderness.   Genitourinary:     Comments: Bloody urine in nj bag  Musculoskeletal:         General: Swelling (doughy edema in all 4 extremities) present.      Cervical back: Neck supple.   Skin:     General: Skin is warm and dry.      Capillary Refill:  Capillary refill takes less than 2 seconds.      Coloration: Skin is not jaundiced.   Neurological:      Mental Status: She is alert and oriented to person, place, and time. Mental status is at baseline.      Cranial Nerves: No cranial nerve deficit.      Coordination: Coordination normal.      Comments: [facility]   Psychiatric:         Behavior: Behavior normal.      Comments: Depressed       Results Review     I reviewed the patient's new clinical results.  Results from last 7 days   Lab Units 11/10/24  0459 11/09/24  0634 11/08/24 0441 11/07/24  0450   WBC 10*3/mm3 9.81 10.16 8.84 11.80*   HEMOGLOBIN g/dL 9.5* 10.2* 9.4* 9.6*   PLATELETS 10*3/mm3 125* 126* 93* 74*     Results from last 7 days   Lab Units 11/10/24  0459 11/08/24  0441 11/07/24  0450 11/06/24  0443   SODIUM mmol/L 143 148* 142 143   POTASSIUM mmol/L 4.4 3.4* 3.1* 3.5   CHLORIDE mmol/L 107 111* 108* 111*   CO2 mmol/L 28.7 28.0 23.3 18.5*   BUN mg/dL 37* 51* 50* 42*   CREATININE mg/dL 0.86 1.27* 1.50* 1.70*   GLUCOSE mg/dL 144* 143* 173* 124*   EGFR mL/min/1.73 66.3 41.5* 34.0* 29.3*     Results from last 7 days   Lab Units 11/10/24  0459 11/04/24  1203 11/03/24  1546   ALBUMIN g/dL 2.7* 2.9* 3.1*   BILIRUBIN mg/dL <0.2 0.6 0.4   ALK PHOS U/L 120* 102 53   AST (SGOT) U/L 13 42* 33*   ALT (SGPT) U/L 13 22 18     Results from last 7 days   Lab Units 11/10/24  0459 11/10/24  0020 11/08/24  0441 11/07/24  0450 11/06/24  0443 11/04/24  1823 11/04/24  1203 11/04/24  0526 11/03/24  1546   CALCIUM mg/dL 8.6  --  8.1* 8.3* 7.3*   < > 7.6* 7.9* 8.4*   ALBUMIN g/dL 2.7*  --   --   --   --   --  2.9*  --  3.1*   MAGNESIUM mg/dL  --  1.8  --  2.0  --   --   --  2.0 1.5*   PHOSPHORUS mg/dL 2.7  --   --  2.7  --   --   --  3.2  --     < > = values in this interval not displayed.     Results from last 7 days   Lab Units 11/05/24  0322 11/04/24  1823 11/04/24  1203 11/04/24  0349 11/03/24  1800 11/03/24  1546   PROCALCITONIN ng/mL  --   --   --   --   --  93.60*   LACTATE  mmol/L 1.6 2.1* 2.2* 2.4*   < > 4.9*    < > = values in this interval not displayed.     Glucose   Date/Time Value Ref Range Status   11/10/2024 1150 157 (H) 70 - 130 mg/dL Final   11/10/2024 0611 139 (H) 70 - 130 mg/dL Final   11/09/2024 2351 128 70 - 130 mg/dL Final   11/09/2024 1718 131 (H) 70 - 130 mg/dL Final   11/09/2024 1129 130 70 - 130 mg/dL Final   11/09/2024 0622 136 (H) 70 - 130 mg/dL Final   11/08/2024 2319 151 (H) 70 - 130 mg/dL Final       No radiology results for the last day    I have personally reviewed all medications:  Scheduled Medications  aspirin, 81 mg, Nasogastric, Daily  cefTRIAXone, 2,000 mg, Intravenous, Q24H  folic acid, 400 mcg, Nasogastric, Daily  ipratropium-albuterol, 3 mL, Nebulization, BID  lansoprazole, 30 mg, Nasogastric, QAM AC  sodium chloride, 10 mL, Intravenous, Q12H    Infusions   Diet  Diet: Regular/House; Feeding Assistance - Nursing; Texture: Pureed (NDD 1); Fluid Consistency: Thin (IDDSI 0)    I have personally reviewed:  [x]  Laboratory   []  Microbiology   []  Radiology   [x]  EKG/Telemetry  []  Cardiology/Vascular   []  Pathology    []  Records       Assessment/Plan     Active Hospital Problems    Diagnosis  POA    **Acute pyelonephritis [N10]  Yes    Primary hypertension [I10]  Yes    HLD (hyperlipidemia) [E78.5]  Yes    Chronic diastolic CHF (congestive heart failure) [I50.32]  Yes    Irritable 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 [K58.2]  Yes    Hydronephrosis of right kidney [N13.30]  Yes    Right ureteral stone [N20.1]  Yes    Acute respiratory failure with hypoxia [J96.01]  No    Thrombocytopenia [D69.6]  Yes    Bacteremia due to Klebsiella pneumoniae [R78.81, B96.1]  Yes    Shock, septic [A41.9, R65.21]  Yes    EMMA (acute kidney injury) [N17.9]  Yes    Stage 3a chronic kidney disease [N18.31]  Yes    Hypernatremia [E87.0]  Yes    Superficial thrombophlebitis of left upper extremity [I80.8]  No    Hypokalemia [E87.6]  No      Resolved Hospital  Problems   No resolved problems to display.       83yo woman with CKD3a, HTN, HLD, chronic diastolic CHF, IBS, and aortic aneurysm, who was admitted to CCU by Intensivist with septic shock due to acute right sided pyelonephritis complicated by right ureteral stone/obstruction. She required mechanical ventilation and pressors. We were asked to assume care when pt came out of CCU.    Acute right pyelonephritis  Right ureteral stone/obstruction  S/p cysto with right ureteral stent placement on 11/3 by Dr. Ledesma  Still has nj  Hematuria not unusual given stent--monitor for resolution, monitor Hgb  F/u with  in 2 weeks  Continue abx as below    Septic shock  Klebsiella bacteremia  Consulted ID  They recommend abx through 11/12  Currently on IV Rocephin, can change to Augmentin if dc'd    EMMA/CKD3a  Renal following  Cr normalized    Hypernatremia  Normalized  Continue free water with TFs    Hypokalemia  Renal replacing    Thrombocytopenia  Due to sepsis  Mild, stable today  Monitor    Chronic diastolic CHF  Type 2 NSTEMI  Mod aortic insufficiency  LAFB  Ascending aortic aneurysm  Card has followed and signed off--nothing further to add    IBS  Monitor bowel function closely    Acute resp failure with hypoxia  Right pleural effusion  Weaning O2 as able  Continue scheduled DuoNebs  Pulm plans thoracentesis of right pleural effusion tomorrow    LUE superficial thrombophlebitis  Supportive care and ASA    Dysphagia  Currently on TFs  Diet started today per SLP recs    Depression  Psych following      SCDs for DVT prophylaxis.  Full code.  Discussed with patient and friend.  Anticipate discharge to SNU facility, timing yet to be determined.  Expected Discharge Date: 11/13/2024; Expected Discharge Time:  3:00 PM      Anish Wills MD  [facility]  11/10/24  13:21 EST

## 2024-11-10 NOTE — CONSULTS
"HPI: Patient is an 84-year-old female presenting for evaluation by [facility] due to depression.  Patient was admitted on November 3 due to acute pyelonephritis.  She is currently resting in bed, daughter at bedside with permission granted to speak freely.  Patient indicates that she has experienced depression for several years, worsened by the death of her  a few years back.  She denies inpatient psychiatric hospitalizations or suicide attempts.  States previously she attempted Lexapro but quit because \"it would not work.\"  Daughter indicates she was not consistent in taking this medication.  Denies alternate use of psychotropics to address conditions.  She currently rates depression a 5/10 \"I cry more than I used to.\"  When asked if depression has been complicated by grief she states \"oh yeah.\"  She denies SI, HI, SIB, or hallucinations and is commencing.  States that she has had moments of believing she would be better off not here but \"I would never do that to my kids.\"  She states \"who doesn't?\"  Denies thoughts with intention or plan, denies previous behaviors related to these conditions.  States anxiety is \"not too bad.\"  She states that she still gets out of the house frequently, daughter states there could be room for improvement in returning to her baseline from a mental health standpoint.  Patient believes her older brother committed suicide, she is unsure related to this.  Patient states she will sleep greater than 12 hours within a 24-hour period.  Will often awaken frequently at night to void.  She is able to drive, remains active within her [employer] witness.  Denies alcohol or drug use previously or currently.  States that she has never participated in psychotherapy.  Somewhat guarded related to this, indicates she does not feel anyone else would care about her needs.    MSE: Patient is alert and oriented in all realms.  Affect flat, congruent with mood which seems sad.  Guarded and resistant to treatments " related to mental health conditions, otherwise cooperative.  Appropriate hygiene.  Decreased activity due to health status.    Social: Patient resides in her house alone.  Her spouse of 60 years passed away a few years ago.  She has 5 children, to live close by.  12 grandchildren and 2 great-grandchildren.  Education includes some college, patient currently retired.  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  Continues to remain active within her [employer], [employer] preference.  Denies arrests.  Family supportive.  She enjoys playing bridge and previously enjoyed chair volleyball.    Plan: Patient uncertain of what/need for resources related to depression at this point.  We did discuss current conditions as well as treatment options, mostly to include psychotherapy and medication management.  She is considering these options, is not opposed to access following.  Discussed if she would like to pursue options we could place an order for psychiatry to consult, likely medications would take several weeks to be effective related to depression.  Patient agreeable to access follow-up and thus we will continue to follow.

## 2024-11-11 ENCOUNTER — APPOINTMENT (OUTPATIENT)
Dept: ULTRASOUND IMAGING | Facility: HOSPITAL | Age: 84
DRG: 853 | End: 2024-11-11
Payer: MEDICARE

## 2024-11-11 LAB
ALBUMIN SERPL-MCNC: 2.8 G/DL (ref 3.5–5.2)
ALP SERPL-CCNC: 134 U/L (ref 39–117)
ALT SERPL W P-5'-P-CCNC: 14 U/L (ref 1–33)
ANION GAP SERPL CALCULATED.3IONS-SCNC: 12.6 MMOL/L (ref 5–15)
AST SERPL-CCNC: 20 U/L (ref 1–32)
BILIRUB CONJ SERPL-MCNC: <0.2 MG/DL (ref 0–0.3)
BILIRUB INDIRECT SERPL-MCNC: ABNORMAL MG/DL
BILIRUB SERPL-MCNC: <0.2 MG/DL (ref 0–1.2)
BUN SERPL-MCNC: 36 MG/DL (ref 8–23)
BUN/CREAT SERPL: 43.4 (ref 7–25)
CALCIUM SPEC-SCNC: 8.8 MG/DL (ref 8.6–10.5)
CHLORIDE SERPL-SCNC: 105 MMOL/L (ref 98–107)
CO2 SERPL-SCNC: 29.4 MMOL/L (ref 22–29)
CREAT SERPL-MCNC: 0.83 MG/DL (ref 0.57–1)
DEPRECATED RDW RBC AUTO: 45.5 FL (ref 37–54)
EGFRCR SERPLBLD CKD-EPI 2021: 69.2 ML/MIN/1.73
ERYTHROCYTE [DISTWIDTH] IN BLOOD BY AUTOMATED COUNT: 13.8 % (ref 12.3–15.4)
GLUCOSE BLDC GLUCOMTR-MCNC: 105 MG/DL (ref 70–130)
GLUCOSE BLDC GLUCOMTR-MCNC: 112 MG/DL (ref 70–130)
GLUCOSE BLDC GLUCOMTR-MCNC: 115 MG/DL (ref 70–130)
GLUCOSE BLDC GLUCOMTR-MCNC: 120 MG/DL (ref 70–130)
GLUCOSE BLDC GLUCOMTR-MCNC: 135 MG/DL (ref 70–130)
GLUCOSE SERPL-MCNC: 133 MG/DL (ref 65–99)
HCT VFR BLD AUTO: 29.9 % (ref 34–46.6)
HGB BLD-MCNC: 9.7 G/DL (ref 12–15.9)
INR PPP: 1.09 (ref 0.9–1.1)
MAGNESIUM SERPL-MCNC: 1.7 MG/DL (ref 1.6–2.4)
MCH RBC QN AUTO: 29.1 PG (ref 26.6–33)
MCHC RBC AUTO-ENTMCNC: 32.4 G/DL (ref 31.5–35.7)
MCV RBC AUTO: 89.8 FL (ref 79–97)
PHOSPHATE SERPL-MCNC: 3.1 MG/DL (ref 2.5–4.5)
PLATELET # BLD AUTO: 151 10*3/MM3 (ref 140–450)
PMV BLD AUTO: 11.7 FL (ref 6–12)
POTASSIUM SERPL-SCNC: 4.7 MMOL/L (ref 3.5–5.2)
PROT SERPL-MCNC: 5.2 G/DL (ref 6–8.5)
PROTHROMBIN TIME: 14.3 SECONDS (ref 11.7–14.2)
RBC # BLD AUTO: 3.33 10*6/MM3 (ref 3.77–5.28)
SODIUM SERPL-SCNC: 147 MMOL/L (ref 136–145)
WBC NRBC COR # BLD AUTO: 9.96 10*3/MM3 (ref 3.4–10.8)

## 2024-11-11 PROCEDURE — 85027 COMPLETE CBC AUTOMATED: CPT | Performed by: HOSPITALIST

## 2024-11-11 PROCEDURE — 94799 UNLISTED PULMONARY SVC/PX: CPT

## 2024-11-11 PROCEDURE — 76942 ECHO GUIDE FOR BIOPSY: CPT

## 2024-11-11 PROCEDURE — 82948 REAGENT STRIP/BLOOD GLUCOSE: CPT

## 2024-11-11 PROCEDURE — 83735 ASSAY OF MAGNESIUM: CPT | Performed by: HOSPITALIST

## 2024-11-11 PROCEDURE — 85610 PROTHROMBIN TIME: CPT | Performed by: HOSPITALIST

## 2024-11-11 PROCEDURE — 94761 N-INVAS EAR/PLS OXIMETRY MLT: CPT

## 2024-11-11 PROCEDURE — 25010000002 CEFTRIAXONE PER 250 MG: Performed by: INTERNAL MEDICINE

## 2024-11-11 PROCEDURE — 25010000002 ONDANSETRON PER 1 MG

## 2024-11-11 PROCEDURE — 80048 BASIC METABOLIC PNL TOTAL CA: CPT | Performed by: INTERNAL MEDICINE

## 2024-11-11 PROCEDURE — 80076 HEPATIC FUNCTION PANEL: CPT | Performed by: HOSPITALIST

## 2024-11-11 PROCEDURE — 94664 DEMO&/EVAL PT USE INHALER: CPT

## 2024-11-11 PROCEDURE — 84100 ASSAY OF PHOSPHORUS: CPT | Performed by: HOSPITALIST

## 2024-11-11 RX ORDER — FUROSEMIDE 10 MG/ML
20 INJECTION INTRAMUSCULAR; INTRAVENOUS EVERY MORNING
Status: DISCONTINUED | OUTPATIENT
Start: 2024-11-12 | End: 2024-11-12

## 2024-11-11 RX ADMIN — Medication 10 ML: at 20:22

## 2024-11-11 RX ADMIN — HYDROCODONE BITARTRATE AND ACETAMINOPHEN 1 TABLET: 5; 325 TABLET ORAL at 13:37

## 2024-11-11 RX ADMIN — TRAMADOL HYDROCHLORIDE 50 MG: 50 TABLET, FILM COATED ORAL at 21:50

## 2024-11-11 RX ADMIN — IPRATROPIUM BROMIDE AND ALBUTEROL SULFATE 3 ML: 2.5; .5 SOLUTION RESPIRATORY (INHALATION) at 19:49

## 2024-11-11 RX ADMIN — LANSOPRAZOLE 30 MG: 15 TABLET, ORALLY DISINTEGRATING ORAL at 08:33

## 2024-11-11 RX ADMIN — CEFTRIAXONE 2000 MG: 2 INJECTION, POWDER, FOR SOLUTION INTRAMUSCULAR; INTRAVENOUS at 17:52

## 2024-11-11 RX ADMIN — IPRATROPIUM BROMIDE AND ALBUTEROL SULFATE 3 ML: 2.5; .5 SOLUTION RESPIRATORY (INHALATION) at 07:52

## 2024-11-11 RX ADMIN — ACETAMINOPHEN 325MG 650 MG: 325 TABLET ORAL at 06:36

## 2024-11-11 RX ADMIN — Medication 10 ML: at 08:33

## 2024-11-11 RX ADMIN — Medication 400 MCG: at 08:33

## 2024-11-11 RX ADMIN — ONDANSETRON 4 MG: 2 INJECTION, SOLUTION INTRAMUSCULAR; INTRAVENOUS at 21:50

## 2024-11-11 RX ADMIN — ASPIRIN 81 MG: 81 TABLET, CHEWABLE ORAL at 08:32

## 2024-11-11 RX ADMIN — ONDANSETRON 4 MG: 2 INJECTION, SOLUTION INTRAMUSCULAR; INTRAVENOUS at 11:23

## 2024-11-11 NOTE — PROGRESS NOTES
"Dr. OBDULIO Siegel    [facility] CORONARY CARE        Patient ID:  Name:  Kristyn Lugo  MRN:  [id number]  1940  84 y.o.  female            CC/Reason for visit: follow up septic shock with acute pyelonephritis     Interval hx: Patient is still short of breath with minimal exertion.  Still requiring oxygen.  Still edematous.  Still poor mobility    ROS: No chest pain, abdominal pain    Vitals:  Vitals:    11/11/24 0400 11/11/24 0419 11/11/24 0700 11/11/24 0752   BP:   121/73    BP Location:   Right arm    Patient Position:       Pulse: 96 94  80   Resp:   22 22   Temp:   97.5 °F (36.4 °C)    TempSrc:   Axillary    SpO2: 97% 97%  96%   Weight:  83.7 kg (184 lb 8.4 oz)     Height:         FiO2 (%): 99 %     Body mass index is 35.29 kg/m².    Intake/Output Summary (Last 24 hours) at 11/11/2024 1202  Last data filed at 11/11/2024 0500  Gross per 24 hour   Intake 2360 ml   Output 2200 ml   Net 160 ml       Exam:  GEN:  No distress  Alert, oriented x 3.   LUNGS: Distant diminished breath sounds bilaterally, right greater than left , no use of accessory muscles  CV:  Regular rate, soft systolic murmur, there is edema leg  ABD:  Non tender, overweight abdomen      Scheduled meds:  aspirin, 81 mg, Nasogastric, Daily  cefTRIAXone, 2,000 mg, Intravenous, Q24H  folic acid, 400 mcg, Nasogastric, Daily  ipratropium-albuterol, 3 mL, Nebulization, BID  lansoprazole, 30 mg, Nasogastric, QAM AC  sodium chloride, 10 mL, Intravenous, Q12H      IV meds:                           Data Review:   I reviewed the patient's medications and new clinical results.    No results found for: \"COVID19\"        Results from last 7 days   Lab Units 11/11/24  0406 11/10/24  0459 11/09/24  0634 11/08/24  0441 11/04/24  1823 11/04/24  1203   SODIUM mmol/L 147* 143  --  148*   < > 139   POTASSIUM mmol/L 4.7 4.4  --  3.4*   < > 3.8   CHLORIDE mmol/L 105 107  --  111*   < > 109*   CO2 mmol/L 29.4* 28.7  --  28.0   < > 15.5*   BUN " mg/dL 36* 37*  --  51*   < > 37*   CREATININE mg/dL 0.83 0.86  --  1.27*   < > 2.54*   CALCIUM mg/dL 8.8 8.6  --  8.1*   < > 7.6*   BILIRUBIN mg/dL <0.2 <0.2  --   --   --  0.6   ALK PHOS U/L 134* 120*  --   --   --  102   ALT (SGPT) U/L 14 13  --   --   --  22   AST (SGOT) U/L 20 13  --   --   --  42*   GLUCOSE mg/dL 133* 144*  --  143*   < > 136*   WBC 10*3/mm3 9.96 9.81 10.16 8.84   < > 35.25*   HEMOGLOBIN g/dL 9.7* 9.5* 10.2* 9.4*   < > 11.7*   PLATELETS 10*3/mm3 151 125* 126* 93*   < > 156   INR  1.09  --   --   --   --   --     < > = values in this interval not displayed.             Results from last 7 days   Lab Units 11/04/24  1203   HSTROP T ng/L 268*     Results from last 7 days   Lab Units 11/07/24  0945 11/07/24  0333 11/06/24  1138 11/06/24  0440 11/05/24  0703 11/05/24  0334 11/04/24  1207   PH, ARTERIAL pH units 7.405 7.395 7.396 7.305* 7.364 7.322* 7.285*   PCO2, ARTERIAL mm Hg 42.9 38.9 34.1* 40.8 35.3 38.2 36.8   PO2 ART mm Hg 73.0* 80.1 59.7* 74.0* 83.9 80.1 107.3*   O2 SATURATION ART % 94.5 95.7 90.6* 93.2 96.0 94.8 97.5   MODALITY  Adult Vent Adult Vent Adult Vent Adult Vent Adult Vent Adult Vent Adult Vent            ASSESSMENT:    Right pleural effusion  Right hydronephrosis with nephrolithiasis  Right distal ureteral calculi s/p cysto with stent insertion 11/3/24  EMMA  Sepsis vs hypovolemic shock  Hypotension  Elevated troponin  Elevated proBNP  Acute hypoxemic respiratory failure  Valvular heart disease  Chronic diastolic heart failure  Anemia  Chronic constipation vs diarrhea  Hx of HTN  Aneurysm of ascending aorta  HLD  Thrombocytopenia consumptive secondary to sepsis continue to monitor observe for any signs of active bleeding  Klebseilla pneumoniae bacteremia        PLAN:  Proceed with thoracentesis under ultrasound guidance later on.  Fluid overload is also difficult to treat since May be a combination of immobility, poor protein intake, and chronic illness.  Give low-dose gentle daily  diuretics.  Needs to start mobilizing with physical therapy  Antibiotics for Klebsiella septicemia per infectious diseases.          Madhu Siegel MD  11/11/2024

## 2024-11-11 NOTE — PROGRESS NOTES
"Access did follow up with pt. Pt's daughter in room with pt. Pt stated she was doing \"ok\". Access will continue to follow.Pt did not want to talk.  "

## 2024-11-11 NOTE — PLAN OF CARE
Goal Outcome Evaluation: Pt remains in CCU in telemetry status on 2L nasal cannula. Pt complains of abdominal discomfort; urinary catheter is in place and pt bladder scanned to ensure no potential retention. Pt still has complaints of right great toe pain, swelling and redness; heat applied; pt has hx of gout and uric acid level within normal range. Urinary output 800 ml this shift. Tramadol given 2x this shift for pain. Plan for US thoracentesis tomorrow.

## 2024-11-11 NOTE — PLAN OF CARE
The patient is alert and oriented. Able to follow commands and moves all extremities. Has weakness in the lower extremities. Normal sinus on the monitors. Blood pressures have been WDL. Tube feeds being tolerated at goal. Back pain is still present. See MAR. See morning labs.    Goal Outcome Evaluation:  Plan of Care Reviewed With: patient        Progress: no change

## 2024-11-11 NOTE — PROGRESS NOTES
Name: Kristyn Lugo ADMIT: 11/3/2024   : 1940  PCP: Sean Fisher MD    MRN: [id number] LOS: 8 days   AGE/SEX: 84 y.o. female  ROOM: [facility]     Subjective   Subjective   Seen in bed.  Daughter at bedside.  Patient still with some nausea.  Daughter states this has been somewhat persistent.  She is not eating much due to the nausea.  She is not vomiting and reports some generalized abdominal discomfort.  Daughter states that her tube feeds were just turned off in order to see if she would be able to eat more now that she has not receiving those.  She denies any chest pain or trouble breathing.  She has been taking some tramadol she does have some chronic back pain followed by Dr. Abdullahi and gets injections for these.  She states that she thought she was getting a gout flare as well, but her uric acid is normal.  She has some mild swelling and redness on the top of her right foot.  They deny any history of blood clotting.  She previously did not have a kidney stone history prior to this admission.     Objective   Objective   Vital Signs  Temp:  [97.5 °F (36.4 °C)-99.5 °F (37.5 °C)] 97.5 °F (36.4 °C)  Heart Rate:  [] 80  Resp:  [20-27] 22  BP: (119-144)/(43-75) 121/73  SpO2:  [95 %-99 %] 96 %  on  Flow (L/min) (Oxygen Therapy):  [2] 2;   Device (Oxygen Therapy): humidified;nasal cannula  Body mass index is 35.29 kg/m².    Physical Exam  Vitals and nursing note reviewed.   Constitutional:       Appearance: She is obese. She is ill-appearing. She is not toxic-appearing.   Cardiovascular:      Rate and Rhythm: Normal rate and regular rhythm.      Pulses: Normal pulses.   Pulmonary:      Effort: Pulmonary effort is normal. No respiratory distress.      Comments: Diminished, on 2L  Abdominal:      General: Bowel sounds are normal. There is no distension.      Palpations: Abdomen is soft.      Tenderness: There is no abdominal tenderness.   Genitourinary:     Comments: Booth anchored. Urine pink  tinged.  Musculoskeletal:         General: Swelling (moderate right foot, mild both legs) and tenderness present. Normal range of motion.   Skin:     General: Skin is warm and dry.      Findings: Erythema (top of right foot) present. No bruising.   Neurological:      General: No focal deficit present.      Mental Status: She is alert and oriented to person, place, and time.      Sensory: No sensory deficit.      Motor: Weakness present.      Coordination: Coordination normal.   Psychiatric:         Mood and Affect: Mood normal.         Behavior: Behavior normal.      Comments: A little flat.       Results Review:       I reviewed the patient's new clinical results.  Results from last 7 days   Lab Units 11/11/24  0406 11/10/24  0459 11/09/24  0634 11/08/24 0441   WBC 10*3/mm3 9.96 9.81 10.16 8.84   HEMOGLOBIN g/dL 9.7* 9.5* 10.2* 9.4*   PLATELETS 10*3/mm3 151 125* 126* 93*     Results from last 7 days   Lab Units 11/11/24  0406 11/10/24  0459 11/08/24  0441 11/07/24  0450   SODIUM mmol/L 147* 143 148* 142   POTASSIUM mmol/L 4.7 4.4 3.4* 3.1*   CHLORIDE mmol/L 105 107 111* 108*   CO2 mmol/L 29.4* 28.7 28.0 23.3   BUN mg/dL 36* 37* 51* 50*   CREATININE mg/dL 0.83 0.86 1.27* 1.50*   GLUCOSE mg/dL 133* 144* 143* 173*   Estimated Creatinine Clearance: 48.2 mL/min (by C-G formula based on SCr of 0.83 mg/dL).  Results from last 7 days   Lab Units 11/11/24  0406 11/10/24  0459 11/04/24  1203   ALBUMIN g/dL 2.8* 2.7* 2.9*   BILIRUBIN mg/dL <0.2 <0.2 0.6   ALK PHOS U/L 134* 120* 102   AST (SGOT) U/L 20 13 42*   ALT (SGPT) U/L 14 13 22     Results from last 7 days   Lab Units 11/11/24  0406 11/10/24  0459 11/10/24  0020 11/08/24 0441 11/07/24  0450 11/04/24  1823 11/04/24  1203   CALCIUM mg/dL 8.8 8.6  --  8.1* 8.3*   < > 7.6*   ALBUMIN g/dL 2.8* 2.7*  --   --   --   --  2.9*   MAGNESIUM mg/dL 1.7  --  1.8  --  2.0  --   --    PHOSPHORUS mg/dL 3.1 2.7  --   --  2.7  --   --     < > = values in this interval not displayed.      Results from last 7 days   Lab Units 11/05/24  0322 11/04/24  1823 11/04/24  1203   LACTATE mmol/L 1.6 2.1* 2.2*     Glucose   Date/Time Value Ref Range Status   11/11/2024 0627 112 70 - 130 mg/dL Final   11/11/2024 0104 135 (H) 70 - 130 mg/dL Final   11/10/2024 1617 116 70 - 130 mg/dL Final   11/10/2024 1150 157 (H) 70 - 130 mg/dL Final   11/10/2024 0611 139 (H) 70 - 130 mg/dL Final   11/09/2024 2351 128 70 - 130 mg/dL Final   11/09/2024 1718 131 (H) 70 - 130 mg/dL Final       aspirin, 81 mg, Nasogastric, Daily  cefTRIAXone, 2,000 mg, Intravenous, Q24H  folic acid, 400 mcg, Nasogastric, Daily  ipratropium-albuterol, 3 mL, Nebulization, BID  lansoprazole, 30 mg, Nasogastric, QAM AC  sodium chloride, 10 mL, Intravenous, Q12H       Diet: Regular/House; Feeding Assistance - Nursing; Texture: Pureed (NDD 1); Fluid Consistency: Thin (IDDSI 0)       Assessment/Plan     Active Hospital Problems    Diagnosis  POA    **Acute pyelonephritis [N10]  Yes    Primary hypertension [I10]  Yes    HLD (hyperlipidemia) [E78.5]  Yes    Chronic diastolic CHF (congestive heart failure) [I50.32]  Yes    Irritable 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 [K58.2]  Yes    Hydronephrosis of right kidney [N13.30]  Yes    Right ureteral stone [N20.1]  Yes    Acute respiratory failure with hypoxia [J96.01]  No    Thrombocytopenia [D69.6]  Yes    Bacteremia due to Klebsiella pneumoniae [R78.81, B96.1]  Yes    Shock, septic [A41.9, R65.21]  Yes    EMMA (acute kidney injury) [N17.9]  Yes    Stage 3a chronic kidney disease [N18.31]  Yes    Hypernatremia [E87.0]  Yes    Superficial thrombophlebitis of left upper extremity [I80.8]  No    Hypokalemia [E87.6]  No      Resolved Hospital Problems   No resolved problems to display.     83yo woman with CKD3a, HTN, HLD, chronic diastolic CHF, IBS, and aortic aneurysm, who was admitted to CCU by Intensivist with septic shock due to acute right sided pyelonephritis complicated by right ureteral  stone/obstruction. She required mechanical ventilation and pressors. We were asked to assume care when pt came out of CCU.     Acute right pyelonephritis  Right ureteral stone/obstruction  S/p cysto with right ureteral stent placement on 11/3 by Dr. Ledesma  Still has nj  Hematuria not unusual given stent--monitor for resolution, monitor Hgb  F/u with  in 2 weeks  Continue abx as below     Septic shock  Klebsiella bacteremia  Consulted ID  They recommend abx through 11/12 which would complete 10 day course  Currently on IV Rocephin, can change to Augmentin if dc'd (very remote PCN allergy)     EMMA/CKD3a  Renal following  Cr normalized     Hypernatremia  Elevated at 147 today. Nephrology following as above.  Continue free water with TFs  Encourage oral water intake     Hypokalemia  Renal replacing     Thrombocytopenia  Due to sepsis  Resolved     Chronic diastolic CHF  Type 2 NSTEMI  Mod aortic insufficiency  LAFB  Ascending aortic aneurysm  Card has followed and signed off--nothing further to add     IBS  Monitor bowel function closely     Acute resp failure with hypoxia  Right pleural effusion  Weaning O2 as able  Continue scheduled DuoNebs  Pulm ordered thoracentesis today but not enough to tap per radiology     LUE superficial thrombophlebitis  Supportive care and ASA     Dysphagia  Currently on TFs  Diet started 11/10 per SLP recs but oral intake poor. Will monitor.     Depression  Psych following    Reports of nausea per daughter. Continue to treat with PRN agents. May consider scopolamine. Patient states she is having bowel movements. Could be medication related. Continue to monitor for now.      SCDs for DVT prophylaxis.  Full code.  Discussed with patient, daughter at bedside and nurse.  Anticipate discharge to SNF/ARF- likely not for another 2-3 days pending her progress and ability to take PO.    JENNIFER Cobos  [facility]  11/11/24  11:40 EST

## 2024-11-11 NOTE — CASE MANAGEMENT/SOCIAL WORK
Continued Stay Note  [facility]     Patient Name: Kristyn Lugo  MRN: [id number]  Today's Date: 11/11/2024    Admit Date: 11/3/2024    Plan: Referral to [facility] pending acceptance. No precert needed.   Discharge Plan       Row Name 11/11/24 1411       Plan    Plan Referral to [facility] pending acceptance. No precert needed.    Roadmap to Recovery Yes    Patient/Family in Agreement with Plan yes    Plan Comments Spoke with patient and daughter at bedside. reviewed PT recommendations of IRF. Both verbalize understandig. Choice list given. Patient and daughter choose [facility]. Referral called to [facility]. DCP sent.                      Expected Discharge Date and Time       Expected Discharge Date Expected Discharge Time    Nov 13, 2024               Nohemi Romo RN

## 2024-11-11 NOTE — PROGRESS NOTES
Nutrition Services    Patient Name: Kristyn Lugo  YOB: 1940  MRN: [id number]  Admission date: 11/3/2024    PROGRESS NOTE      Encounter Information: Checking in on TF tolerance/PO diet initiation. SLP assessed pt 11/9, recommending initiation of puree/thins. Continuous TF order remains in place. Plan for thoracentesis today. RD spoke with pt and pt's daughter Marcela. Pt reporting nausea. IV Zofran last given 11/10. Pt's nurse marian entered room during conversation and alerted nurse of pt's complaint of nausea.     Pt and pt's daughter agreeable to transition to nocturnal TF to see if this increases pt's appetite during the day. RD alerted pt's daughter that TF formula and rate would change.        PO Diet: Diet: Regular/House; Feeding Assistance - Nursing; Texture: Pureed (NDD 1); Fluid Consistency: Thin (IDDSI 0)   PO Supplements: -   PO Intake:  No intake recorded since diet initiated        Current nutrition support: Peptamen AF @ 50 mL/hr with 200 mL q4 FWF   Nutrition support review: Infusing as ordered per documentation        Labs (reviewed below): Hypernatremia - FWF previously adjusted by nephrology. RD reached out to nephrology APRN re: FWF after changing to nocturnal TF but RD did not hear back. RD alerted Nephrologist via secure chat - message was seen. 1.6L free water deficit per MD calc.        GI Function:  + BM 11/10 - diarrhea        Nutrition Intervention Updates: Addition of Boost Glucose Control BID (Provides 380 kcals, 32 g protein if consumed) - chocolate.    Addition of Magic Cup q daily (290 kcals, 9 g protein if consumed).    Change TF to nocturnal.        Estimated/Assessed Needs       Energy Requirements    EST Needs, Method, Wt used 5399-5264 kcal/day (14-17 kcal/kg, 83.7 kg)       Protein Requirements    EST Needs, Method, Wt used 72-95 g/day (1.5-2.0 g/kg IBW, 47.7 kg)       Fluid Requirements     Estimated Needs (mL/day) 500 mL + total output     Enteral  "Prescription   End Goal:    Nocturnal TF:  Isosource 1.5 at 65 mL/hr + 200 mL q4 water flush (per nephrology) from 8PM to 8AM.     Calories  1170 kcal (100% lower end of estimated needs)   Protein  53 g   Free water  596 mL   Flushes         Water flushes adjusted based on clinical picture + Rx flushes/IV fluids          TPN Prescription        Results from last 7 days   Lab Units 11/11/24  0406 11/10/24  0459 11/08/24  0441 11/04/24  1823 11/04/24  1203   SODIUM mmol/L 147* 143 148*   < > 139   POTASSIUM mmol/L 4.7 4.4 3.4*   < > 3.8   CHLORIDE mmol/L 105 107 111*   < > 109*   CO2 mmol/L 29.4* 28.7 28.0   < > 15.5*   BUN mg/dL 36* 37* 51*   < > 37*   CREATININE mg/dL 0.83 0.86 1.27*   < > 2.54*   CALCIUM mg/dL 8.8 8.6 8.1*   < > 7.6*   BILIRUBIN mg/dL <0.2 <0.2  --   --  0.6   ALK PHOS U/L 134* 120*  --   --  102   ALT (SGPT) U/L 14 13  --   --  22   AST (SGOT) U/L 20 13  --   --  42*   GLUCOSE mg/dL 133* 144* 143*   < > 136*    < > = values in this interval not displayed.     Results from last 7 days   Lab Units 11/11/24  0406 11/10/24  0459 11/10/24  0020 11/08/24  0441 11/07/24  0450   MAGNESIUM mg/dL 1.7  --  1.8  --  2.0   PHOSPHORUS mg/dL 3.1   < >  --   --  2.7   HEMOGLOBIN g/dL 9.7*   < >  --    < > 9.6*   HEMATOCRIT % 29.9*   < >  --    < > 29.1*    < > = values in this interval not displayed.     No results found for: \"COVID19\"  No results found for: \"HGBA1C\"    Wt Readings from Last 10 Encounters:   11/11/24 0419 83.7 kg (184 lb 8.4 oz)   11/08/24 0549 80 kg (176 lb 5.9 oz)   11/07/24 0452 80.6 kg (177 lb 11.1 oz)   11/04/24 1840 78 kg (171 lb 15.3 oz)   11/04/24 0600 78.4 kg (172 lb 13.5 oz)   11/03/24 2000 77 kg (169 lb 12.1 oz)   11/03/24 1429 74.8 kg (164 lb 14.5 oz)   10/16/23 0945 74.8 kg (165 lb)   10/05/23 1505 74.4 kg (164 lb)       RD to follow up per protocol.    Electronically signed by:  Noemy Bennett RD  11/11/24 08:11 EST    "

## 2024-11-11 NOTE — PROGRESS NOTES
Stable from our almost 18 years 18 years patient HAS a left renal dialysis and reported that he was in the hospital Wednesday 3 days use think can transition    PROGRESS NOTE      Patient Name: Kristyn Lugo  : 1940  MRN: [id number]  Primary Care Physician: Sean Fisher MD  Date of admission: 11/3/2024    Patient Care Team:  Sean Fisher MD as PCP - General (Internal Medicine)        Subjective   Subjective:     Patient seen and examined, no distress  UOP 1.6L  Plans for right thoracentesis for mod effusion, on 2L NC    Review of systems:  All ROS negative      Allergies:    Allergies   Allergen Reactions    Fenofibrate Unknown - High Severity     Pt is unaware    Penicillins Rash     caused a rash in the 1960s; she says she has tolerated amoxicillin or Augmentin since that time.       Objective   Exam:     Vital Signs  Temp:  [97.5 °F (36.4 °C)-99.5 °F (37.5 °C)] 97.5 °F (36.4 °C)  Heart Rate:  [] 80  Resp:  [20-27] 22  BP: (119-144)/(43-75) 121/73  SpO2:  [95 %-99 %] 96 %  on  Flow (L/min) (Oxygen Therapy):  [2] 2;   Device (Oxygen Therapy): humidified;nasal cannula  Body mass index is 35.29 kg/m².    General:  elderly  female intubated sedated.    Head:      Normocephalic and atraumatic.    Eyes:      PERRL/EOM intact, conjunctivae and sclerae clear without nystagmus.    Neck:      No masses, thyromegaly,  trachea central with normal respiratory effort   Lungs:    Clear bilaterally to auscultation.    Heart:      Regular rate and rhythm, no murmur no gallop  Abd:        Soft, nontender, not distended, bowel sounds positive, no shifting dullness   Pulses:   Pulses palpable  Extr:        No cyanosis or clubbing--mild edema.    Neuro:    sedated  Skin:       Intact without lesions or rashes.    Psych:    Alert and cooperative; normal mood and affect; .      Results Review:  I have personally reviewed most recent Data :  CBC    Results from last 7 days   Lab Units 24  5471  11/10/24  0459 11/09/24  0634 11/08/24 0441 11/07/24 0450 11/06/24 0443 11/05/24 0322   WBC 10*3/mm3 9.96 9.81 10.16 8.84 11.80* 19.97* 27.79*   HEMOGLOBIN g/dL 9.7* 9.5* 10.2* 9.4* 9.6* 9.9* 10.7*   PLATELETS 10*3/mm3 151 125* 126* 93* 74* 84* 103*     CMP   Results from last 7 days   Lab Units 11/11/24  0406 11/10/24  0459 11/08/24 0441 11/07/24 0450 11/06/24 0443 11/05/24 0322 11/04/24  1823   SODIUM mmol/L 147* 143 148* 142 143 140 144   POTASSIUM mmol/L 4.7 4.4 3.4* 3.1* 3.5 3.4* 3.7   CHLORIDE mmol/L 105 107 111* 108* 111* 108* 109*   CO2 mmol/L 29.4* 28.7 28.0 23.3 18.5* 18.0* 17.5*   BUN mg/dL 36* 37* 51* 50* 42* 39* 39*   CREATININE mg/dL 0.83 0.86 1.27* 1.50* 1.70* 1.99* 2.15*   GLUCOSE mg/dL 133* 144* 143* 173* 124* 118* 128*   ALBUMIN g/dL 2.8* 2.7*  --   --   --   --   --    BILIRUBIN mg/dL <0.2 <0.2  --   --   --   --   --    ALK PHOS U/L 134* 120*  --   --   --   --   --    AST (SGOT) U/L 20 13  --   --   --   --   --    ALT (SGPT) U/L 14 13  --   --   --   --   --      ABG    Results from last 7 days   Lab Units 11/07/24  0945 11/07/24  0333 11/06/24  1138 11/06/24 0440 11/05/24  0703 11/05/24  0334   PH, ARTERIAL pH units 7.405 7.395 7.396 7.305* 7.364 7.322*   PCO2, ARTERIAL mm Hg 42.9 38.9 34.1* 40.8 35.3 38.2   PO2 ART mm Hg 73.0* 80.1 59.7* 74.0* 83.9 80.1   O2 SATURATION ART % 94.5 95.7 90.6* 93.2 96.0 94.8   BASE EXCESS ART mmol/L 1.9 -0.9* -3.5* -5.7* -4.7* -5.8*     No radiology results for the last day    Results for orders placed during the hospital encounter of 11/03/24    Adult Transthoracic Echo Complete W/ Cont if Necessary Per Protocol    Interpretation Summary    Left ventricular systolic function is normal. Calculated left ventricular EF = 52.4%    Left ventricular wall thickness is consistent with mild septal asymmetric hypertrophy.    Left ventricular diastolic function is consistent with age.    Mild aortic valve stenosis is present.    Peak velocity of the flow distal  to the aortic valve is 251 cm/s. Aortic valve maximum pressure gradient is 25 mmHg. Aortic valve mean pressure gradient is 14 mmHg. Aortic valve dimensionless index is 0.6 .    Mild mitral valve stenosis is present. The mitral valve mean gradient is 5 mmHg.    Estimated right ventricular systolic pressure from tricuspid regurgitation is normal (<35 mmHg).    Scheduled Meds:aspirin, 81 mg, Nasogastric, Daily  cefTRIAXone, 2,000 mg, Intravenous, Q24H  folic acid, 400 mcg, Nasogastric, Daily  [START ON 11/12/2024] furosemide, 20 mg, Intravenous, QAM  ipratropium-albuterol, 3 mL, Nebulization, BID  lansoprazole, 30 mg, Nasogastric, QAM AC  sodium chloride, 10 mL, Intravenous, Q12H      Continuous Infusions:     PRN Meds:  acetaminophen **OR** acetaminophen    HYDROcodone-acetaminophen    nitroglycerin    ondansetron    sodium chloride    sodium chloride    sodium chloride    sodium chloride    sodium chloride    traMADol    Assessment & Plan   Assessment and Plan:         Acute pyelonephritis    Primary hypertension    HLD (hyperlipidemia)    Chronic diastolic CHF (congestive heart failure)    Irritable 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    Hydronephrosis of right kidney    Right ureteral stone    Acute respiratory failure with hypoxia    Thrombocytopenia    Bacteremia due to Klebsiella pneumoniae    Shock, septic    EMMA (acute kidney injury)    Stage 3a chronic kidney disease    Hypernatremia    Superficial thrombophlebitis of left upper extremity    Hypokalemia    ASSESSMENT:  Acute kidney injury  Chronic kidney disease, stage 3, with baseline creatinine roughly 1.0mg/dL; felt to be secondary to hypertension and advanced age  Metabolic acidosis  Hypokalemia  Right hydronephrosis with nephrolithiasis  Right distal ureteral calculi, s/p cysto with stent insertion 11/3/24  Sepsis  Acute hypoxemic respiratory failure  Chronic diastolic heart failure  Hypernatremia     PLAN :     Renal function improved and  at baseline  CXR 11/8 with more opacities/infiltrates, with pleural fluid  Noted Pulmonary plans for right thoracentesis, noted giving lasix 20mg IV starting on 11/12   Hypernatremia, on free water. Continue for now as sodium level is still on the high side  EMMA most likely multifactorial and secondary to hemodynamic insults, sepsis and right hydronephrosis, most likely has ATN  Making urine. Urine studies c/w prerenal picture   Proteinuria 1.5 gm f/u with SPEP  Supplement electrolytes as per protocol  Continue to follow-up with the volume status closely  Keep MAP>65  Antibiotics as per primary team. Currently on Rocephin  Will request strict I/Os, daily labs  Discussed in detail with RN    Note transcribed for Dr Valentín Hall kidney consultant  [phone]  11/11/2024  12:38 EST

## 2024-11-11 NOTE — SIGNIFICANT NOTE
11/11/24 1425   OTHER   Discipline physical therapist   Rehab Time/Intention   Session Not Performed patient unavailable for treatment  (pt off floor for thoracentesis this afternoon. will follow up tomorrow.)   Recommendation   PT - Next Appointment 11/12/24

## 2024-11-11 NOTE — DISCHARGE PLACEMENT REQUEST
"Marilu Lugo (84 y.o. Female)       Date of Birth   1940    Social Security Number       Address   [address] [place] [address]    Home Phone   [phone]    MRN   [id number]       [employer]   None    Marital Status                               Admission Date   11/3/24    Admission Type   Emergency    Admitting Provider   Juan Antonio Lee MD    Attending Provider   José Becker MD    Department, Room/Bed   [facility], N340/1       Discharge Date       Discharge Disposition       Discharge Destination                                 Attending Provider: José Becker MD    Allergies: Fenofibrate, Penicillins    Isolation: None   Infection: None   Code Status: CPR    Ht: 154 cm (60.63\")   Wt: 83.7 kg (184 lb 8.4 oz)    Admission Cmt: None   Principal Problem: Acute pyelonephritis [N10]                   Active Insurance as of 11/3/2024       Primary Coverage       Payor Plan Insurance Group Employer/Plan Group    MEDICARE MEDICARE A & B        Payor Plan Address Payor Plan Phone Number Payor Plan Fax Number Effective Dates    [address] [phone]  1/1/2004 - None Entered    [facility] [id number]         Subscriber Name Subscriber Birth Date Member ID       MARILU LUGO 1940 [id number]               Secondary Coverage       Payor Plan Insurance Group Employer/Plan Group    [facility] SUPP [id number]       Payor Plan Address Payor Plan Phone Number Payor Plan Fax Number Effective Dates    [address]   12/1/2016 - None Entered    [facility] [id number]         Subscriber Name Subscriber Birth Date Member ID       MARILU LUGO 1940 [id number]                     Emergency Contacts        (Rel.) Home Phone Work Phone Mobile Phone    ANALI LOUISE (Daughter) [phone] -- [phone]    ZURI SINGH (Daughter) -- -- [phone]    JAXONGLORIA (Son) -- " -- [phone]    Jillian Olmos (Daughter) -- -- [phone]

## 2024-11-12 ENCOUNTER — APPOINTMENT (OUTPATIENT)
Dept: GENERAL RADIOLOGY | Facility: HOSPITAL | Age: 84
DRG: 853 | End: 2024-11-12
Payer: MEDICARE

## 2024-11-12 LAB
ALBUMIN SERPL ELPH-MCNC: 2.2 G/DL (ref 2.9–4.4)
ALBUMIN/GLOB SERPL: 1.1 {RATIO} (ref 0.7–1.7)
ALPHA1 GLOB SERPL ELPH-MCNC: 0.3 G/DL (ref 0–0.4)
ALPHA2 GLOB SERPL ELPH-MCNC: 0.9 G/DL (ref 0.4–1)
ANION GAP SERPL CALCULATED.3IONS-SCNC: 10 MMOL/L (ref 5–15)
B-GLOBULIN SERPL ELPH-MCNC: 0.6 G/DL (ref 0.7–1.3)
BUN SERPL-MCNC: 37 MG/DL (ref 8–23)
BUN/CREAT SERPL: 45.7 (ref 7–25)
CALCIUM SPEC-SCNC: 8.6 MG/DL (ref 8.6–10.5)
CHLORIDE SERPL-SCNC: 103 MMOL/L (ref 98–107)
CO2 SERPL-SCNC: 29 MMOL/L (ref 22–29)
CREAT SERPL-MCNC: 0.81 MG/DL (ref 0.57–1)
DEPRECATED RDW RBC AUTO: 42.8 FL (ref 37–54)
EGFRCR SERPLBLD CKD-EPI 2021: 71.2 ML/MIN/1.73
ERYTHROCYTE [DISTWIDTH] IN BLOOD BY AUTOMATED COUNT: 13.7 % (ref 12.3–15.4)
GAMMA GLOB SERPL ELPH-MCNC: 0.4 G/DL (ref 0.4–1.8)
GLOBULIN SER-MCNC: 2.2 G/DL (ref 2.2–3.9)
GLUCOSE BLDC GLUCOMTR-MCNC: 103 MG/DL (ref 70–130)
GLUCOSE BLDC GLUCOMTR-MCNC: 107 MG/DL (ref 70–130)
GLUCOSE BLDC GLUCOMTR-MCNC: 114 MG/DL (ref 70–130)
GLUCOSE BLDC GLUCOMTR-MCNC: 138 MG/DL (ref 70–130)
GLUCOSE SERPL-MCNC: 147 MG/DL (ref 65–99)
HCT VFR BLD AUTO: 27.6 % (ref 34–46.6)
HGB BLD-MCNC: 9.2 G/DL (ref 12–15.9)
IGA SERPL-MCNC: 65 MG/DL (ref 64–422)
IGG SERPL-MCNC: 475 MG/DL (ref 586–1602)
IGM SERPL-MCNC: 48 MG/DL (ref 26–217)
INTERPRETATION SERPL IEP-IMP: ABNORMAL
KAPPA LC FREE SER-MCNC: 18.7 MG/L (ref 3.3–19.4)
KAPPA LC FREE/LAMBDA FREE SER: 1.35 {RATIO} (ref 0.26–1.65)
LABORATORY COMMENT REPORT: ABNORMAL
LAMBDA LC FREE SERPL-MCNC: 13.9 MG/L (ref 5.7–26.3)
M PROTEIN SERPL ELPH-MCNC: ABNORMAL G/DL
MCH RBC QN AUTO: 29 PG (ref 26.6–33)
MCHC RBC AUTO-ENTMCNC: 33.3 G/DL (ref 31.5–35.7)
MCV RBC AUTO: 87.1 FL (ref 79–97)
PLATELET # BLD AUTO: 183 10*3/MM3 (ref 140–450)
PMV BLD AUTO: 11.7 FL (ref 6–12)
POTASSIUM SERPL-SCNC: 4.6 MMOL/L (ref 3.5–5.2)
PROT SERPL-MCNC: 4.4 G/DL (ref 6–8.5)
RBC # BLD AUTO: 3.17 10*6/MM3 (ref 3.77–5.28)
SODIUM SERPL-SCNC: 142 MMOL/L (ref 136–145)
WBC NRBC COR # BLD AUTO: 9.33 10*3/MM3 (ref 3.4–10.8)

## 2024-11-12 PROCEDURE — 92526 ORAL FUNCTION THERAPY: CPT

## 2024-11-12 PROCEDURE — 94664 DEMO&/EVAL PT USE INHALER: CPT

## 2024-11-12 PROCEDURE — 82948 REAGENT STRIP/BLOOD GLUCOSE: CPT

## 2024-11-12 PROCEDURE — 94799 UNLISTED PULMONARY SVC/PX: CPT

## 2024-11-12 PROCEDURE — 97535 SELF CARE MNGMENT TRAINING: CPT

## 2024-11-12 PROCEDURE — 80048 BASIC METABOLIC PNL TOTAL CA: CPT | Performed by: INTERNAL MEDICINE

## 2024-11-12 PROCEDURE — 97530 THERAPEUTIC ACTIVITIES: CPT

## 2024-11-12 PROCEDURE — 25010000002 FUROSEMIDE PER 20 MG: Performed by: INTERNAL MEDICINE

## 2024-11-12 PROCEDURE — 85027 COMPLETE CBC AUTOMATED: CPT | Performed by: NURSE PRACTITIONER

## 2024-11-12 PROCEDURE — 94761 N-INVAS EAR/PLS OXIMETRY MLT: CPT

## 2024-11-12 PROCEDURE — 25010000002 CEFTRIAXONE PER 250 MG: Performed by: INTERNAL MEDICINE

## 2024-11-12 PROCEDURE — 94760 N-INVAS EAR/PLS OXIMETRY 1: CPT

## 2024-11-12 PROCEDURE — 74018 RADEX ABDOMEN 1 VIEW: CPT

## 2024-11-12 RX ORDER — METOLAZONE 2.5 MG/1
2.5 TABLET ORAL DAILY
Status: DISCONTINUED | OUTPATIENT
Start: 2024-11-12 | End: 2024-11-14

## 2024-11-12 RX ORDER — DIPHENHYDRAMINE HYDROCHLORIDE AND LIDOCAINE HYDROCHLORIDE AND ALUMINUM HYDROXIDE AND MAGNESIUM HYDRO
10 KIT EVERY 6 HOURS
Status: DISCONTINUED | OUTPATIENT
Start: 2024-11-12 | End: 2024-11-15 | Stop reason: HOSPADM

## 2024-11-12 RX ORDER — CALCIUM CARBONATE 500 MG/1
2 TABLET, CHEWABLE ORAL 3 TIMES DAILY PRN
Status: DISCONTINUED | OUTPATIENT
Start: 2024-11-12 | End: 2024-11-15 | Stop reason: HOSPADM

## 2024-11-12 RX ORDER — AMOXICILLIN 250 MG
2 CAPSULE ORAL 2 TIMES DAILY
Status: DISCONTINUED | OUTPATIENT
Start: 2024-11-12 | End: 2024-11-15 | Stop reason: HOSPADM

## 2024-11-12 RX ADMIN — CEFTRIAXONE 2000 MG: 2 INJECTION, POWDER, FOR SOLUTION INTRAMUSCULAR; INTRAVENOUS at 17:31

## 2024-11-12 RX ADMIN — Medication 400 MCG: at 10:23

## 2024-11-12 RX ADMIN — Medication 10 ML: at 20:53

## 2024-11-12 RX ADMIN — TRAMADOL HYDROCHLORIDE 50 MG: 50 TABLET, FILM COATED ORAL at 17:31

## 2024-11-12 RX ADMIN — METOLAZONE 2.5 MG: 2.5 TABLET ORAL at 21:48

## 2024-11-12 RX ADMIN — FUROSEMIDE 20 MG: 10 INJECTION, SOLUTION INTRAMUSCULAR; INTRAVENOUS at 06:02

## 2024-11-12 RX ADMIN — IPRATROPIUM BROMIDE AND ALBUTEROL SULFATE 3 ML: 2.5; .5 SOLUTION RESPIRATORY (INHALATION) at 09:06

## 2024-11-12 RX ADMIN — Medication 10 ML: at 10:24

## 2024-11-12 RX ADMIN — SENNOSIDES AND DOCUSATE SODIUM 2 TABLET: 50; 8.6 TABLET ORAL at 17:31

## 2024-11-12 RX ADMIN — DIPHENHYDRAMINE HYDROCHLORIDE AND LIDOCAINE HYDROCHLORIDE AND ALUMINUM HYDROXIDE AND MAGNESIUM HYDRO 10 ML: KIT at 17:31

## 2024-11-12 RX ADMIN — ASPIRIN 81 MG: 81 TABLET, CHEWABLE ORAL at 10:23

## 2024-11-12 RX ADMIN — TRAMADOL HYDROCHLORIDE 50 MG: 50 TABLET, FILM COATED ORAL at 04:37

## 2024-11-12 RX ADMIN — LANSOPRAZOLE 30 MG: 15 TABLET, ORALLY DISINTEGRATING ORAL at 06:01

## 2024-11-12 RX ADMIN — IPRATROPIUM BROMIDE AND ALBUTEROL SULFATE 3 ML: 2.5; .5 SOLUTION RESPIRATORY (INHALATION) at 19:57

## 2024-11-12 RX ADMIN — ANTACID TABLETS 2 TABLET: 500 TABLET, CHEWABLE ORAL at 05:12

## 2024-11-12 NOTE — PROGRESS NOTES
PROGRESS NOTE      Patient Name: Kristyn Lugo  : 1940  MRN: [id number]  Primary Care Physician: Sean Fisher MD  Date of admission: 11/3/2024    Patient Care Team:  Sean Fisher MD as PCP - General (Internal Medicine)        Subjective   Subjective:     Patient seen and examined, no distress  UOP 1.7L  Not enough fluid for thoracentesis, on 2L NC    Review of systems:  All ROS negative      Allergies:    Allergies   Allergen Reactions    Fenofibrate Unknown - High Severity     Pt is unaware    Penicillins Rash     caused a rash in the 1960s; she says she has tolerated amoxicillin or Augmentin since that time.       Objective   Exam:     Vital Signs  Temp:  [97.7 °F (36.5 °C)-98.4 °F (36.9 °C)] 98.2 °F (36.8 °C)  Heart Rate:  [] 87  Resp:  [16-23] 18  BP: (120-147)/(62-77) 129/62  SpO2:  [93 %-99 %] 96 %  on  Flow (L/min) (Oxygen Therapy):  [2] 2;   Device (Oxygen Therapy): humidified;nasal cannula  Body mass index is 36.85 kg/m².    General:  elderly  female intubated sedated.    Head:      Normocephalic and atraumatic.    Eyes:      PERRL/EOM intact, conjunctivae and sclerae clear without nystagmus.    Neck:      No masses, thyromegaly,  trachea central with normal respiratory effort   Lungs:    Clear bilaterally to auscultation.    Heart:      Regular rate and rhythm, no murmur no gallop  Abd:        Soft, nontender, not distended, bowel sounds positive, no shifting dullness   Pulses:   Pulses palpable  Extr:        No cyanosis or clubbing--mild edema.    Neuro:    sedated  Skin:       Intact without lesions or rashes.    Psych:    Alert and cooperative; normal mood and affect; .      Results Review:  I have personally reviewed most recent Data :  CBC    Results from last 7 days   Lab Units 243 24  0406 11/10/24  0459 24  0634 24  0441 24  0450 24  0443   WBC 10*3/mm3 9.33 9.96 9.81 10.16 8.84 11.80* 19.97*   HEMOGLOBIN g/dL 9.2* 9.7*  9.5* 10.2* 9.4* 9.6* 9.9*   PLATELETS 10*3/mm3 183 151 125* 126* 93* 74* 84*     CMP   Results from last 7 days   Lab Units 11/12/24  0443 11/11/24  0406 11/10/24  0459 11/08/24  0441 11/07/24  0450 11/06/24  0443   SODIUM mmol/L 142 147* 143 148* 142 143   POTASSIUM mmol/L 4.6 4.7 4.4 3.4* 3.1* 3.5   CHLORIDE mmol/L 103 105 107 111* 108* 111*   CO2 mmol/L 29.0 29.4* 28.7 28.0 23.3 18.5*   BUN mg/dL 37* 36* 37* 51* 50* 42*   CREATININE mg/dL 0.81 0.83 0.86 1.27* 1.50* 1.70*   GLUCOSE mg/dL 147* 133* 144* 143* 173* 124*   ALBUMIN g/dL  --  2.8* 2.7*  --   --   --    BILIRUBIN mg/dL  --  <0.2 <0.2  --   --   --    ALK PHOS U/L  --  134* 120*  --   --   --    AST (SGOT) U/L  --  20 13  --   --   --    ALT (SGPT) U/L  --  14 13  --   --   --      ABG    Results from last 7 days   Lab Units 11/07/24  0945 11/07/24  0333 11/06/24  1138 11/06/24  0440   PH, ARTERIAL pH units 7.405 7.395 7.396 7.305*   PCO2, ARTERIAL mm Hg 42.9 38.9 34.1* 40.8   PO2 ART mm Hg 73.0* 80.1 59.7* 74.0*   O2 SATURATION ART % 94.5 95.7 90.6* 93.2   BASE EXCESS ART mmol/L 1.9 -0.9* -3.5* -5.7*     US Thoracentesis    Result Date: 11/11/2024  Minimal pleural fluid present. Thoracentesis not performed   This report was finalized on 11/11/2024 3:00 PM by Dr. Brent Ortega M.D on Workstation: [id number]       Results for orders placed during the hospital encounter of 11/03/24    Adult Transthoracic Echo Complete W/ Cont if Necessary Per Protocol    Interpretation Summary    Left ventricular systolic function is normal. Calculated left ventricular EF = 52.4%    Left ventricular wall thickness is consistent with mild septal asymmetric hypertrophy.    Left ventricular diastolic function is consistent with age.    Mild aortic valve stenosis is present.    Peak velocity of the flow distal to the aortic valve is 251 cm/s. Aortic valve maximum pressure gradient is 25 mmHg. Aortic valve mean pressure gradient is 14 mmHg. Aortic valve dimensionless index is  0.6 .    Mild mitral valve stenosis is present. The mitral valve mean gradient is 5 mmHg.    Estimated right ventricular systolic pressure from tricuspid regurgitation is normal (<35 mmHg).    Scheduled Meds:aspirin, 81 mg, Nasogastric, Daily  cefTRIAXone, 2,000 mg, Intravenous, Q24H  folic acid, 400 mcg, Nasogastric, Daily  furosemide, 20 mg, Intravenous, QAM  ipratropium-albuterol, 3 mL, Nebulization, BID  lansoprazole, 30 mg, Nasogastric, QAM AC  sodium chloride, 10 mL, Intravenous, Q12H      Continuous Infusions:     PRN Meds:  acetaminophen **OR** acetaminophen    calcium carbonate    nitroglycerin    ondansetron    sodium chloride    sodium chloride    sodium chloride    sodium chloride    sodium chloride    traMADol    Assessment & Plan   Assessment and Plan:         Acute pyelonephritis    Primary hypertension    HLD (hyperlipidemia)    Chronic diastolic CHF (congestive heart failure)    Irritable 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    Hydronephrosis of right kidney    Right ureteral stone    Acute respiratory failure with hypoxia    Thrombocytopenia    Bacteremia due to Klebsiella pneumoniae    Shock, septic    EMMA (acute kidney injury)    Stage 3a chronic kidney disease    Hypernatremia    Superficial thrombophlebitis of left upper extremity    Hypokalemia    ASSESSMENT:  Acute kidney injury  Chronic kidney disease, stage 3, with baseline creatinine roughly 1.0mg/dL; felt to be secondary to hypertension and advanced age  Metabolic acidosis  Hypokalemia  Right hydronephrosis with nephrolithiasis  Right distal ureteral calculi, s/p cysto with stent insertion 11/3/24  Sepsis  Acute hypoxemic respiratory failure  Chronic diastolic heart failure  Hypernatremia     PLAN :     Renal function improved and at baseline  CXR 11/8 with more opacities/infiltrates, with pleural fluid  Noted Pulmonary plans, not enough fluid for thoracentesis, noted on lasix 20mg IV daily as blood pressure is better with  high sodium I will start patient on metolazone and stop Lasix  Hypernatremia, on free water. Continue for now as sodium level is still on the high side  EMMA most likely multifactorial and secondary to hemodynamic insults, sepsis and right hydronephrosis, most likely has ATN  Making urine. Urine studies c/w prerenal picture   Proteinuria 1.5 gm f/u with SPEP  Supplement electrolytes as per protocol  Continue to follow-up with the volume status closely  Keep MAP>65  Antibiotics as per primary team. Currently on Rocephin  Will request strict I/Os, daily labs  Discussed in detail with RN    Note transcribed for Dr Valentín Hall kidney consultant  [phone]  11/12/2024  11:57 EST

## 2024-11-12 NOTE — PROGRESS NOTES
Dr. OBDULIO Siegel    [facility] CORONARY CARE        Patient ID:  Name:  Kristyn Lugo  MRN:  [id number]  1940  84 y.o.  female            CC/Reason for visit: follow up septic shock with acute pyelonephritis      Interval hx: Essentially no change.  Patient is still short of breath with minimal exertion.  Still requiring oxygen.  Still edematous.  Still poor mobility     ROS: No chest pain, abdominal pain    Vitals:  Vitals:    11/12/24 0323 11/12/24 0531 11/12/24 0906 11/12/24 0913   BP: 120/70   129/62   BP Location: Right arm   Right arm   Patient Position: Lying   Sitting   Pulse: 89  87    Resp: 16  20 18   Temp: 97.8 °F (36.6 °C)   98.2 °F (36.8 °C)   TempSrc:    Oral   SpO2: 95%  96%    Weight:  87.4 kg (192 lb 10.9 oz)     Height:         FiO2 (%): 99 %     Body mass index is 36.85 kg/m².    Intake/Output Summary (Last 24 hours) at 11/12/2024 1329  Last data filed at 11/12/2024 1124  Gross per 24 hour   Intake 865 ml   Output 3025 ml   Net -2160 ml       Exam:  GEN:  No distress  Alert, oriented x 2, little confused  LUNGS: Scattered rhonchi and crackles bilat, no use of accessory muscles  CV:  Normal S1S2, without murmur, there is leg edema  ABD:  Non tender, obesity hampers the exam      Scheduled meds:  aspirin, 81 mg, Nasogastric, Daily  cefTRIAXone, 2,000 mg, Intravenous, Q24H  First Mouthwash (Magic Mouthwash), 10 mL, Swish & Spit, Q6H  folic acid, 400 mcg, Nasogastric, Daily  furosemide, 20 mg, Intravenous, QAM  ipratropium-albuterol, 3 mL, Nebulization, BID  lansoprazole, 30 mg, Nasogastric, QAM AC  senna-docusate sodium, 2 tablet, Oral, BID  sodium chloride, 10 mL, Intravenous, Q12H      IV meds:                           Data Review:   I reviewed the patient's medications and new clinical results.           Results from last 7 days   Lab Units 11/12/24  0443 11/11/24  0406 11/10/24  0459   SODIUM mmol/L 142 147* 143   POTASSIUM mmol/L 4.6 4.7 4.4   CHLORIDE mmol/L 103 105  107   CO2 mmol/L 29.0 29.4* 28.7   BUN mg/dL 37* 36* 37*   CREATININE mg/dL 0.81 0.83 0.86   CALCIUM mg/dL 8.6 8.8 8.6   BILIRUBIN mg/dL  --  <0.2 <0.2   ALK PHOS U/L  --  134* 120*   ALT (SGPT) U/L  --  14 13   AST (SGOT) U/L  --  20 13   GLUCOSE mg/dL 147* 133* 144*   WBC 10*3/mm3 9.33 9.96 9.81   HEMOGLOBIN g/dL 9.2* 9.7* 9.5*   PLATELETS 10*3/mm3 183 151 125*   INR   --  1.09  --                  Results from last 7 days   Lab Units 11/07/24  0945 11/07/24  0333 11/06/24  1138 11/06/24  0440   PH, ARTERIAL pH units 7.405 7.395 7.396 7.305*   PCO2, ARTERIAL mm Hg 42.9 38.9 34.1* 40.8   PO2 ART mm Hg 73.0* 80.1 59.7* 74.0*   O2 SATURATION ART % 94.5 95.7 90.6* 93.2   MODALITY  Adult Vent Adult Vent Adult Vent Adult Vent              ASSESSMENT:   Right pleural effusion  Right hydronephrosis with nephrolithiasis  Right distal ureteral calculi s/p cysto with stent insertion 11/3/24  EMMA  Sepsis vs hypovolemic shock  Hypotension  Elevated troponin  Elevated proBNP  Acute hypoxemic respiratory failure  Valvular heart disease  Chronic diastolic heart failure  Anemia  Chronic constipation vs diarrhea  Hx of HTN  Aneurysm of ascending aorta  HLD  Thrombocytopenia consumptive secondary to sepsis continue to monitor observe for any signs of active bleeding  Klebseilla pneumoniae bacteremia      PLAN:  Ultrasound yesterday showed minimal fluid present, no thoracentesis performed.  Shortness of breath is about the same.  Her labs are essentially stable and not terribly abnormal.  Her blood gas a few days ago was quite stable.  Not much else to offer from the pulmonary standpoint.  Wonder if her heart murmur and cardiac function are mostly to blame.  No suspicion for bacterial lung infection at this time.  Suggest gentle ongoing diuresis.  Suggest mobilization with physical therapy.  Try to wean off oxygen if possible  Mobilization out of bed is crucial in order to help expand lungs and improve pulmonary hygiene with  mobilization of respiratory secretions.  We will be available if needed        Madhu Siegel MD  11/12/2024

## 2024-11-12 NOTE — THERAPY TREATMENT NOTE
Patient Name: Kristyn Lugo  : 1940    MRN: [id number]                              Today's Date: 2024       Admit Date: 11/3/2024    Visit Dx:     ICD-10-CM ICD-9-CM   1. Septic shock  A41.9 038.9    R65.21 785.52     995.92   2. Acute kidney injury  N17.9 584.9   3. Occult GI bleeding  R19.5 792.1   4. Acute respiratory failure with hypoxia  J96.01 518.81   5. Acute pyelonephritis  N10 590.10   6. Kidney stone on right side, distal UVJ with hydronephrosis  N20.0 592.0     Patient Active Problem List   Diagnosis    Lumbar spinal stenosis    Spondylolisthesis of lumbar region    Acute pyelonephritis    Primary hypertension    HLD (hyperlipidemia)    Chronic diastolic CHF (congestive heart failure)    Irritable 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    Hydronephrosis of right kidney    Right ureteral stone    Acute respiratory failure with hypoxia    Thrombocytopenia    Bacteremia due to Klebsiella pneumoniae    Shock, septic    EMMA (acute kidney injury)    Stage 3a chronic kidney disease    Hypernatremia    Superficial thrombophlebitis of left upper extremity    Hypokalemia     Past Medical History:   Diagnosis Date    Aortic valve regurgitation     Chronic kidney disease     Low back pain      Past Surgical History:   Procedure Laterality Date    CYSTOSCOPY W/ URETERAL STENT PLACEMENT Right 11/3/2024    Procedure: CYSTOSCOPY URETERAL CATHETER/STENT INSERTION;  Surgeon: Tony Ledesma MD;  Location: [facility];  Service: Urology;  Laterality: Right;      General Information       Row Name 24 1221          OT Time and Intention    Subjective Information no complaints  -BC     Document Type therapy note (daily note)  -BC     Mode of Treatment individual therapy;occupational therapy  -BC     Patient Effort good  -BC       Row Name 24 1221          General Information    Patient Profile Reviewed yes  -BC     Existing Precautions/Restrictions fall  -BC       Row  Name 11/12/24 1221          Cognition    Orientation Status (Cognition) other (see comments);oriented to;person;place;situation  low vocal tone, also [place]. Increased time  -BC       Row Name 11/12/24 1221          Safety Issues/Impairments Affecting Functional Mobility    Safety Issues Affecting Function (Mobility) judgment;sequencing abilities  -BC     Impairments Affecting Function (Mobility) balance;endurance/activity tolerance;strength;pain;cognition  -BC     Cognitive Impairments, Mobility Safety/Performance problem-solving/reasoning;sequencing abilities  -BC               User Key  (r) = Recorded By, (t) = Taken By, (c) = Cosigned By      Initials Name Provider Type    BC Roland Gentile OT Occupational Therapist                     Mobility/ADL's       Row Name 11/12/24 1222          Bed Mobility    Bed Mobility supine-sit  -BC     Supine-Sit [place] (Bed Mobility) supervision;verbal cues  -BC     Assistive Device (Bed Mobility) bed rails;head of bed elevated  -BC     Comment, (Bed Mobility) increased time but w/ close Sup A pt was able to bring BLES toward EOB, reach and use bed rail to sit self up w/ increased time/cues. once sitting up assist to bring bed flat and pt was able to push up and maintain unsupported sitting close SBA.  -BC       Row Name 11/12/24 1222          Transfers    Transfers sit-stand transfer;stand-sit transfer;bed-chair transfer  -BC     Comment, (Transfers) stand pivot EOB>chair on R side  -BC       Row Name 11/12/24 1222          Bed-Chair Transfer    Bed-Chair [place] (Transfers) moderate assist (50% patient effort);1 person assist  -BC     Comment, (Bed-Chair Transfer) Mod AX1 sit>stand w/ gross weakness arm in arm support after unable to come to full stand at walker on 1st attempt.  -BC       Row Name 11/12/24 1222          Sit-Stand Transfer    Sit-Stand [place] (Transfers) moderate assist (50% patient effort);1 person assist;verbal cues  -BC     Comment,  (Sit-Stand Transfer) arm in arm assist to come to full stand  -BC       Row Name 11/12/24 1222          Stand-Sit Transfer    Stand-Sit [place] (Transfers) moderate assist (50% patient effort)  -BC     Comment, (Stand-Sit Transfer) mod to max Ax1  -BC       Row Name 11/12/24 1222          Functional Mobility    Patient was able to Ambulate no, other medical factors prevent ambulation  -BC     Reason Patient was unable to Ambulate Excessive Weakness  -BC       Row Name 11/12/24 1222          Lower Body Dressing Assessment/Training    [place] Level (Lower Body Dressing) dependent (less than 25% patient effort);socks;don  -BC     Comment, (Lower Body Dressing) substantial edema in all extremities impacting mobility/ROM for reach w/ dressing  -BC       Row Name 11/12/24 1222          Grooming Assessment/Training    [place] Level (Grooming) grooming skills;standby assist  -BC     Comment, (Grooming) seated up in chair supported grooming tasks  -BC       Row Name 11/12/24 1222          Toileting Assessment/Training    [place] Level (Toileting) dependent (less than 25% patient effort)  -BC     Comment, (Toileting) FC and brief  -BC               User Key  (r) = Recorded By, (t) = Taken By, (c) = Cosigned By      Initials Name Provider Type    BC Roland Gentile OT Occupational Therapist                   Obj/Interventions       Row Name 11/12/24 1225          Motor Skills    Functional Endurance Poor  -BC       Row Name 11/12/24 1225          Balance    Balance Assessment sitting static balance  -BC     Static Sitting Balance supervision  -BC     Position, Sitting Balance sitting edge of bed  -BC     Static Standing Balance moderate assist  -BC     Comment, Balance static sitting balance unsupported EOB with Sup to SBA with efrain hand open/close for edema mgmt and BLE ankle pumps and kicks in prep for transition x~15 mins EOB.  -BC               User Key  (r) = Recorded By, (t) = Taken By, (c) = Cosigned  By      Initials Name Provider Type    BC Roland Gentile OT Occupational Therapist                   Goals/Plan    No documentation.                  Clinical Impression       Row Name 11/12/24 1226          Pain Assessment    Pretreatment Pain Rating 0/10 - no pain  -BC     Posttreatment Pain Rating 0/10 - no pain  -BC       Row Name 11/12/24 1226          Plan of Care Review    Plan of Care Reviewed With patient  -BC     Progress improving  -BC     Outcome Evaluation Pt w/ signifcant improvement in performance and function and with increased time/cues today was able to sit up from supine to EOB w/ supervision A. Pt was able to participate in BUE/BLE mvmts for edema mgmt, sitting balance training, and transfer training for upright/OOB tolerance for ADl participation. Continue IP acute OT services with REC: IRF when medically stable to return to highest level of (I) before home.  -BC       Row Name 11/12/24 1226          Therapy Assessment/Plan (OT)    Rehab Potential (OT) good  -BC     Criteria for Skilled Therapeutic Interventions Met (OT) yes  -[facility] Name 11/12/24 1226          Therapy Plan Review/Discharge Plan (OT)    Anticipated Discharge Disposition (OT) inpatient rehabilitation facility  -[facility] Name 11/12/24 1226          Vital Signs    Pre SpO2 (%) 96  -BC     O2 Delivery Pre Treatment nasal cannula  1.5L  -BC     O2 Delivery Intra Treatment nasal cannula  -BC     O2 Delivery Post Treatment nasal cannula  -BC     Pre Patient Position Supine  -BC     Intra Patient Position Sitting  -BC     Post Patient Position Sitting  -[facility] Name 11/12/24 1226          Positioning and Restraints    Pre-Treatment Position in bed  -BC     Post Treatment Position chair  -BC     In Chair reclined;call light within reach;encouraged to call for assist;exit alarm on;LUE elevated;RUE elevated  notified PCA; SCD applied per RN request  -BC               User Key  (r) = Recorded By, (t) = Taken By, (c) =  Cosigned By      Initials Name Provider Type    Roland Gordon OT Occupational Therapist                   Outcome Measures       Row Name 11/12/24 1230          How much help from another is currently needed...    Putting on and taking off regular lower body clothing? 1  -BC     Bathing (including washing, rinsing, and drying) 2  -BC     Toileting (which includes using toilet bed pan or urinal) 1  -BC     Putting on and taking off regular upper body clothing 2  -BC     Taking care of personal grooming (such as brushing teeth) 3  -BC     Eating meals 3  -BC     AM-PAC 6 Clicks Score (OT) 12  -BC       Row Name 11/12/24 1230          Functional Assessment    Outcome Measure Options AM-PAC 6 Clicks Daily Activity (OT)  -BC               User Key  (r) = Recorded By, (t) = Taken By, (c) = Cosigned By      Initials Name Provider Type    Roland Gordon OT Occupational Therapist                    Occupational Therapy Education       Title: PT OT SLP Therapies (In Progress)       Topic: Occupational Therapy (In Progress)       Point: ADL training (Done)       Description:   Instruct learner(s) on proper safety adaptation and remediation techniques during self care or transfers.   Instruct in proper use of assistive devices.                  Learning Progress Summary            Patient Acceptance, E, VU by PP at 11/9/2024 1149    Comment: Pt Ed on OT role and dc planning.   Family Acceptance, E, VU by PP at 11/9/2024 1149    Comment: Pt Ed on OT role and dc planning.                      Point: Home exercise program (Not Started)       Description:   Instruct learner(s) on appropriate technique for monitoring, assisting and/or progressing therapeutic exercises/activities.                  Learner Progress:  Not documented in this visit.              Point: Precautions (Not Started)       Description:   Instruct learner(s) on prescribed precautions during self-care and functional transfers.                  Learner  Progress:  Not documented in this visit.              Point: Body mechanics (Not Started)       Description:   Instruct learner(s) on proper positioning and spine alignment during self-care, functional mobility activities and/or exercises.                  Learner Progress:  Not documented in this visit.                              User Key       Initials Effective Dates Name Provider Type Discipline    PP 06/09/23 -  Susan Beckham OT Occupational Therapist OT                  OT Recommendation and Plan     Plan of Care Review  Plan of Care Reviewed With: patient  Progress: improving  Outcome Evaluation: Pt w/ signifcant improvement in performance and function and with increased time/cues today was able to sit up from supine to EOB w/ supervision A. Pt was able to participate in BUE/BLE mvmts for edema mgmt, sitting balance training, and transfer training for upright/OOB tolerance for ADl participation. Continue IP acute OT services with REC: IRF when medically stable to return to highest level of (I) before home.     Time Calculation:         Time Calculation- OT       Row Name 11/12/24 1230             Time Calculation- OT    OT Start Time 1021  -BC      OT Stop Time 1053  -BC      OT Time Calculation (min) 32 min  -BC      Total Timed Code Minutes- OT 25 minute(s)  -BC      OT Received On 11/12/24  -BC      OT - Next Appointment 11/13/24  -BC         Timed Charges    17904 - OT Therapeutic Activity Minutes 15  -BC      76435 - OT Self Care/Mgmt Minutes 10  -BC         Total Minutes    Timed Charges Total Minutes 25  -BC       Total Minutes 25  -BC                User Key  (r) = Recorded By, (t) = Taken By, (c) = Cosigned By      Initials Name Provider Type    BC Roland Gentile OT Occupational Therapist                  Therapy Charges for Today       Code Description Service Date Service Provider Modifiers Qty    [id number]  OT THERAPEUTIC ACT EA 15 MIN 11/12/2024 Roland Gentile OT GO 1     [id number] HC OT SELF CARE/MGMT/TRAIN EA 15 MIN 11/12/2024 Roland Gentile OT GO 1                 Roland Gentile OT  11/12/2024

## 2024-11-12 NOTE — PROGRESS NOTES
Nutrition Services    Patient Name: Kristyn Lugo  YOB: 1940  MRN: [id number]  Admission date: 11/3/2024    PROGRESS NOTE      Encounter Information: Checking in on TF tolerance/PO diet tolerance. Pt did not have thoracentesis due to not enough fluid to tap. Pt complaining of abdominal discomfort - MD mcguire'dhara stopping TF.        PO Diet: Diet: Regular/House; Feeding Assistance - Nursing; Texture: Pureed (NDD 1); Fluid Consistency: Thin (IDDSI 0)   PO Supplements: Boost Glucose Control BID (Provides 380 kcals, 32 g protein if consumed)   Magic Cup q daily (290 kcals, 9 g protein if consumed)      PO Intake:  No intake recorded since diet initiated, pt's daughter reported poor PO intake 11/11       Current nutrition support: Nocturnal TF: Isosource 1.5 @ 65 mL/hr with 200 mL q4 FWF from 8PM to 8AM   Nutrition support review: TF stopped r/t pt complaining of abdominal discomfort        Labs (reviewed below): Reviewed, management per attending        GI Function:  + BM 11/10 - diarrhea        Nutrition Intervention Updates: Continue current diet and ONS + nocturnal TF. Encourage good PO intake.      Results from last 7 days   Lab Units 11/12/24  0443 11/11/24  0406 11/10/24  0459   SODIUM mmol/L 142 147* 143   POTASSIUM mmol/L 4.6 4.7 4.4   CHLORIDE mmol/L 103 105 107   CO2 mmol/L 29.0 29.4* 28.7   BUN mg/dL 37* 36* 37*   CREATININE mg/dL 0.81 0.83 0.86   CALCIUM mg/dL 8.6 8.8 8.6   BILIRUBIN mg/dL  --  <0.2 <0.2   ALK PHOS U/L  --  134* 120*   ALT (SGPT) U/L  --  14 13   AST (SGOT) U/L  --  20 13   GLUCOSE mg/dL 147* 133* 144*     Results from last 7 days   Lab Units 11/12/24 0443 11/11/24  0406 11/10/24  0459 11/10/24  0020 11/08/24  0441 11/07/24  0450   MAGNESIUM mg/dL  --  1.7  --  1.8  --  2.0   PHOSPHORUS mg/dL  --  3.1   < >  --   --  2.7   HEMOGLOBIN g/dL 9.2* 9.7*   < >  --    < > 9.6*   HEMATOCRIT % 27.6* 29.9*   < >  --    < > 29.1*    < > = values in this interval not displayed.  "    No results found for: \"COVID19\"  No results found for: \"HGBA1C\"    Wt Readings from Last 10 Encounters:   11/12/24 0531 87.4 kg (192 lb 10.9 oz)   11/11/24 0419 83.7 kg (184 lb 8.4 oz)   11/08/24 0549 80 kg (176 lb 5.9 oz)   11/07/24 0452 80.6 kg (177 lb 11.1 oz)   11/04/24 1840 78 kg (171 lb 15.3 oz)   11/04/24 0600 78.4 kg (172 lb 13.5 oz)   11/03/24 2000 77 kg (169 lb 12.1 oz)   11/03/24 1429 74.8 kg (164 lb 14.5 oz)   10/16/23 0945 74.8 kg (165 lb)   10/05/23 1505 74.4 kg (164 lb)       RD to follow up per protocol.    Electronically signed by:  Noemy Bennett RD  11/12/24 09:10 EST    "

## 2024-11-12 NOTE — CASE MANAGEMENT/SOCIAL WORK
Continued Stay Note  [facility]     Patient Name: Kristyn Lugo  MRN: [id number]  Today's Date: 11/12/2024    Admit Date: 11/3/2024    Plan: Referral to [facility] IRF   Discharge Plan       Row Name 11/12/24 1043       Plan    Plan Referral to [facility] IRF    Plan Comments VM from Afsaneh who reports she will send paperwork for approval for IRF to [facility] medical director. Once approved CCP can arrange transport. Packet on CCP desk.                   Discharge Codes    No documentation.                 Expected Discharge Date and Time       Expected Discharge Date Expected Discharge Time    Nov 13, 2024               MACY Zhang

## 2024-11-12 NOTE — CASE MANAGEMENT/SOCIAL WORK
Continued Stay Note  [facility]     Patient Name: Kristyn Lugo  MRN: [id number]  Today's Date: 11/12/2024    Admit Date: 11/3/2024    Plan: [facility] IRF   Discharge Plan       Row Name 11/12/24 1251       Plan    Plan [facility] IR[facility]    Plan Comments Spoke keith Shelby, pt has been accepted by medical director at [facility]. Packet on desk. CCP to follow.      Row Name 11/12/24 1043       Plan    Plan Referral to [facility] IRF    Plan Comments VM from Afsaneh who reports she will send paperwork for approval for IRF to [facility] medical director. Once approved CCP can arrange transport. Packet on CCP desk.                   Discharge Codes    No documentation.                 Expected Discharge Date and Time       Expected Discharge Date Expected Discharge Time    Nov 13, 2024               MACY Zhang

## 2024-11-12 NOTE — THERAPY TREATMENT NOTE
Patient Name: Kristyn Lugo  : 1940    MRN: [id number]                              Today's Date: 2024       Admit Date: 11/3/2024    Visit Dx:     ICD-10-CM ICD-9-CM   1. Septic shock  A41.9 038.9    R65.21 785.52     995.92   2. Acute kidney injury  N17.9 584.9   3. Occult GI bleeding  R19.5 792.1   4. Acute respiratory failure with hypoxia  J96.01 518.81   5. Acute pyelonephritis  N10 590.10   6. Kidney stone on right side, distal UVJ with hydronephrosis  N20.0 592.0     Patient Active Problem List   Diagnosis    Lumbar spinal stenosis    Spondylolisthesis of lumbar region    Acute pyelonephritis    Primary hypertension    HLD (hyperlipidemia)    Chronic diastolic CHF (congestive heart failure)    Irritable 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    Hydronephrosis of right kidney    Right ureteral stone    Acute respiratory failure with hypoxia    Thrombocytopenia    Bacteremia due to Klebsiella pneumoniae    Shock, septic    EMMA (acute kidney injury)    Stage 3a chronic kidney disease    Hypernatremia    Superficial thrombophlebitis of left upper extremity    Hypokalemia     Past Medical History:   Diagnosis Date    Aortic valve regurgitation     Chronic kidney disease     Low back pain      Past Surgical History:   Procedure Laterality Date    CYSTOSCOPY W/ URETERAL STENT PLACEMENT Right 11/3/2024    Procedure: CYSTOSCOPY URETERAL CATHETER/STENT INSERTION;  Surgeon: Toyn Ledesma MD;  Location: [facility];  Service: Urology;  Laterality: Right;      General Information       Row Name 24 1420          Physical Therapy Time and Intention    Document Type therapy note (daily note)  -EF     Mode of Treatment individual therapy;physical therapy  -EF       Row Name 24 1420          General Information    Existing Precautions/Restrictions fall  -EF     Barriers to Rehab medically complex  -EF       Row Name 24 1420          Cognition    Orientation Status  (Cognition) oriented to;person;place;situation  -EF       Row Name 11/12/24 1420          Safety Issues/Impairments Affecting Functional Mobility    Impairments Affecting Function (Mobility) balance;endurance/activity tolerance;pain;strength;postural/trunk control  -EF               User Key  (r) = Recorded By, (t) = Taken By, (c) = Cosigned By      Initials Name Provider Type    EF Mylene Marroquin, PT Physical Therapist                   Mobility       Row Name 11/12/24 1420          Bed Mobility    Supine-Sit [place] (Bed Mobility) minimum assist (75% patient effort);verbal cues  -EF     Sit-Supine [place] (Bed Mobility) moderate assist (50% patient effort);verbal cues  -EF     Assistive Device (Bed Mobility) head of bed elevated;bed rails  -EF     Comment, (Bed Mobility) increased time required to complete with cues for sequencing  -EF       Row Name 11/12/24 1420          Sit-Stand Transfer    Sit-Stand [place] (Transfers) moderate assist (50% patient effort);2 person assist;verbal cues  -EF     Assistive Device (Sit-Stand Transfers) walker, front-wheeled  -EF     Comment, (Sit-Stand Transfer) cues for hand placement; STS x4 from EOB  -EF       Row Name 11/12/24 1420          Gait/Stairs (Locomotion)    [place] Level (Gait) moderate assist (50% patient effort);2 person assist;verbal cues  -EF     Assistive Device (Gait) walker, front-wheeled  -EF     Distance in Feet (Gait) --  4 side steps to HOB  -EF     Deviations/Abnormal Patterns (Gait) cami decreased;stride length decreased  -EF     Bilateral Gait Deviations forward flexed posture  -EF     Left Sided Gait Deviations decreased knee extension  -EF     Right Sided Gait Deviations decreased knee extension  -EF     Comment, (Gait/Stairs) Cues for sequencing, assist to guide rwx  -EF               User Key  (r) = Recorded By, (t) = Taken By, (c) = Cosigned By      Initials Name Provider Type    EF Mylene Marroquin PT Physical  Therapist                   Obj/Interventions       Row Name 11/12/24 1423          Balance    Static Sitting Balance standby assist  -EF     Dynamic Sitting Balance contact guard  -EF     Position, Sitting Balance unsupported;sitting edge of bed  -EF     Static Standing Balance minimal assist;2-person assist  -EF     Dynamic Standing Balance moderate assist;2-person assist  -EF     Position/Device Used, Standing Balance supported;walker, front-wheeled  -EF               User Key  (r) = Recorded By, (t) = Taken By, (c) = Cosigned By      Initials Name Provider Type    EF Mylene Marroquin, PT Physical Therapist                   Goals/Plan    No documentation.                  Clinical Impression       Row Name 11/12/24 1424          Pain    Pain Location foot;back  -EF     Pain Side/Orientation generalized  -EF     Pain Management Interventions positioning techniques utilized  -EF     Response to Pain Interventions activity participation with tolerable pain  -EF     Pre/Posttreatment Pain Comment Pt complains of pain in feet, low back during therapy session but did not provide pain rating  -EF       Row Name 11/12/24 1424          Plan of Care Review    Plan of Care Reviewed With patient  -EF     Progress improving  -EF     Outcome Evaluation Pt demos slow but steady progress with strength and endurance, as evidenced by tolerance of increased activity today. Pt performed bed mobility with mod A and STS transfers with mod A x2 and rwx. Pt was able to take a few side steps to HOB with mod A x2 and rwx. Pt continues to fatigue quickly with upright mobility and will continue to benefit from PT to address strength and overall mobility.  -EF       Row Name 11/12/24 1424          Positioning and Restraints    Pre-Treatment Position in bed  -EF     Post Treatment Position bed  -EF     In Bed fowlers;call light within reach;encouraged to call for assist;exit alarm on;with family/caregiver;notified nsg;SCD pump applied;LUE  elevated;RUE elevated  -EF               User Key  (r) = Recorded By, (t) = Taken By, (c) = Cosigned By      Initials Name Provider Type    Mylene Carias, PT Physical Therapist                   Outcome Measures       Row Name 11/12/24 1429          How much help from another person do you currently need...    Turning from your back to your side while in flat bed without using bedrails? 3  -EF     Moving from lying on back to sitting on the side of a flat bed without bedrails? 2  -EF     Moving to and from a bed to a chair (including a wheelchair)? 2  -EF     Standing up from a chair using your arms (e.g., wheelchair, bedside chair)? 2  -EF     Climbing 3-5 steps with a railing? 1  -EF     To walk in hospital room? 2  -EF     AM-PAC 6 Clicks Score (PT) 12  -EF     Highest Level of Mobility Goal 4 --> Transfer to chair/commode  -EF       Row Name 11/12/24 1230          Functional Assessment    Outcome Measure Options AM-PAC 6 Clicks Daily Activity (OT)  -BC               User Key  (r) = Recorded By, (t) = Taken By, (c) = Cosigned By      Initials Name Provider Type    Mylene Carias, PT Physical Therapist    BC Roland Gentile OT Occupational Therapist                                 Physical Therapy Education       Title: PT OT SLP Therapies (In Progress)       Topic: Physical Therapy (Done)       Point: Mobility training (Done)       Learning Progress Summary            Patient Acceptance, E,TB, VU,NR by KEANU at 11/12/2024 1429    Acceptance, E, VU,NR by FAMILIA at 11/9/2024 1323                      Point: Home exercise program (Done)       Learning Progress Summary            Patient Acceptance, E, VU,NR by FAMILIA at 11/9/2024 1323                      Point: Body mechanics (Done)       Learning Progress Summary            Patient Acceptance, E,TB, VU,NR by  at 11/12/2024 1429    Acceptance, E, VU,NR by  at 11/9/2024 1323                      Point: Precautions (Done)       Learning Progress Summary             Patient Acceptance, E, VU,NR by FAMILIA at 11/9/2024 1323                                      User Key       Initials Effective Dates Name Provider Type Discipline    EF 05/31/24 -  Mylene Marroquin, ROBERT Physical Therapist PT    FAMILIA 10/25/19 -  Sera Morales PT Physical Therapist PT                  PT Recommendation and Plan     Progress: improving  Outcome Evaluation: Pt demos slow but steady progress with strength and endurance, as evidenced by tolerance of increased activity today. Pt performed bed mobility with mod A and STS transfers with mod A x2 and rwx. Pt was able to take a few side steps to HOB with mod A x2 and rwx. Pt continues to fatigue quickly with upright mobility and will continue to benefit from PT to address strength and overall mobility.     Time Calculation:         PT Charges       Row Name 11/12/24 1429             Time Calculation    Start Time 1330  -EF      Stop Time 1354  -EF      Time Calculation (min) 24 min  -EF      PT Received On 11/12/24  -EF      PT - Next Appointment 11/13/24  -EF         Time Calculation- PT    Total Timed Code Minutes- PT 24 minute(s)  -EF         Timed Charges    11747 - PT Therapeutic Activity Minutes 24  -EF         Total Minutes    Timed Charges Total Minutes 24  -EF       Total Minutes 24  -EF                User Key  (r) = Recorded By, (t) = Taken By, (c) = Cosigned By      Initials Name Provider Type    EF Mylene Marroquin, PT Physical Therapist                  Therapy Charges for Today       Code Description Service Date Service Provider Modifiers Qty    09914216483  PT THERAPEUTIC ACT EA 15 MIN 11/12/2024 Mylene Marroquin, PT GP 2            PT G-Codes  Outcome Measure Options: AM-PAC 6 Clicks Daily Activity (OT)  AM-PAC 6 Clicks Score (PT): 12  AM-PAC 6 Clicks Score (OT): 12  PT Discharge Summary  Anticipated Discharge Disposition (PT): inpatient rehabilitation facility    Mylene Marroquin PT  11/12/2024

## 2024-11-12 NOTE — PLAN OF CARE
Goal Outcome Evaluation:  Plan of Care Reviewed With: patient        Progress: improving  Outcome Evaluation: Pt w/ signifcant improvement in performance and function and with increased time/cues today was able to sit up from supine to EOB w/ supervision A. Pt was able to participate in BUE/BLE mvmts for edema mgmt, sitting balance training, and transfer training for upright/OOB tolerance for ADl participation. Continue IP acute OT services with REC: IRF when medically stable to return to highest level of (I) before home.    Anticipated Discharge Disposition (OT): inpatient rehabilitation facility                         The Service to gastro order in workqueue [id number] requested on 4/12/2021  by Raza Galarza MD has been removed as, patient declined to schedule and does not want a courtesy call back.  Ordering provider has been notified.   If patient returns call GI will reinstate the order.        Please contact patient, if further communication is needed.

## 2024-11-12 NOTE — PROGRESS NOTES
" LOS: 9 days     Name: Kristyn Lugo  Age: 84 y.o.  Sex: female  :  1940  MRN: [id number]         Primary Care Physician: Sean Fisher MD    Subjective   Subjective  Complains of some nausea last night.  Nocturnal tube feeds were stopped prematurely due to this.  Presently eating lunch and has no complaints of nausea.  Her daughter states that she is not a big eater and her meals are rather small.  Diet to be upgraded today as per speech therapy    Objective   Vital Signs  Temp:  [97.7 °F (36.5 °C)-98.4 °F (36.9 °C)] 98.2 °F (36.8 °C)  Heart Rate:  [] 87  Resp:  [16-23] 18  BP: (120-147)/(62-77) 129/62  Body mass index is 36.85 kg/m².    Objective:  General Appearance:  Comfortable and in no acute distress.    Vital signs: (most recent): Blood pressure 129/62, pulse 87, temperature 98.2 °F (36.8 °C), temperature source Oral, resp. rate 18, height 154 cm (60.63\"), weight 87.4 kg (192 lb 10.9 oz), SpO2 96%.    Lungs:  Normal effort and normal respiratory rate.    Heart: Normal rate.  Regular rhythm.    Abdomen: Abdomen is soft.  Bowel sounds are normal.   There is no abdominal tenderness.     Extremities: There is no dependent edema or local swelling.    Neurological: Patient is alert and oriented to person, place and time.    Skin:  Warm and dry.                Results Review:       I reviewed the patient's new clinical results.    Results from last 7 days   Lab Units 246 11/10/24  0459 24  0634 24  0441 24  0450 24   WBC 10*3/mm3 9.33 9.96 9.81 10.16 8.84 11.80* 19.97*   HEMOGLOBIN g/dL 9.2* 9.7* 9.5* 10.2* 9.4* 9.6* 9.9*   PLATELETS 10*3/mm3 183 151 125* 126* 93* 74* 84*     Results from last 7 days   Lab Units 2424  0406 11/10/24  0459 24  0441 24  0450 24  0443   SODIUM mmol/L 142 147* 143 148* 142 143   POTASSIUM mmol/L 4.6 4.7 4.4 3.4* 3.1* 3.5   CHLORIDE mmol/L 103 105 107 111* 108* 111* "   CO2 mmol/L 29.0 29.4* 28.7 28.0 23.3 18.5*   BUN mg/dL 37* 36* 37* 51* 50* 42*   CREATININE mg/dL 0.81 0.83 0.86 1.27* 1.50* 1.70*   CALCIUM mg/dL 8.6 8.8 8.6 8.1* 8.3* 7.3*   GLUCOSE mg/dL 147* 133* 144* 143* 173* 124*     Results from last 7 days   Lab Units 11/11/24  0406   INR  1.09             Scheduled Meds:   aspirin, 81 mg, Nasogastric, Daily  cefTRIAXone, 2,000 mg, Intravenous, Q24H  folic acid, 400 mcg, Nasogastric, Daily  furosemide, 20 mg, Intravenous, QAM  ipratropium-albuterol, 3 mL, Nebulization, BID  lansoprazole, 30 mg, Nasogastric, QAM AC  senna-docusate sodium, 2 tablet, Oral, BID  sodium chloride, 10 mL, Intravenous, Q12H      PRN Meds:     acetaminophen **OR** acetaminophen    calcium carbonate    nitroglycerin    ondansetron    sodium chloride    sodium chloride    sodium chloride    sodium chloride    sodium chloride    traMADol  Continuous Infusions:       Assessment & Plan   Active Hospital Problems    Diagnosis  POA    **Acute pyelonephritis [N10]  Yes    Primary hypertension [I10]  Yes    HLD (hyperlipidemia) [E78.5]  Yes    Chronic diastolic CHF (congestive heart failure) [I50.32]  Yes    Irritable 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 [K58.2]  Yes    Hydronephrosis of right kidney [N13.30]  Yes    Right ureteral stone [N20.1]  Yes    Acute respiratory failure with hypoxia [J96.01]  No    Thrombocytopenia [D69.6]  Yes    Bacteremia due to Klebsiella pneumoniae [R78.81, B96.1]  Yes    Shock, septic [A41.9, R65.21]  Yes    EMMA (acute kidney injury) [N17.9]  Yes    Stage 3a chronic kidney disease [N18.31]  Yes    Hypernatremia [E87.0]  Yes    Superficial thrombophlebitis of left upper extremity [I80.8]  No    Hypokalemia [E87.6]  No      Resolved Hospital Problems   No resolved problems to display.       Assessment & Plan    83yo woman with CKD3a, HTN, HLD, chronic diastolic CHF, IBS, and aortic aneurysm, who was admitted to CCU by Intensivist with septic shock due to acute  right sided pyelonephritis complicated by right ureteral stone/obstruction. She required mechanical ventilation and pressors. We were asked to assume care when pt came out of CCU.     Acute right pyelonephritis  Right ureteral stone/obstruction  S/p cysto with right ureteral stent placement on 11/3 by Dr. Ledesma  Still has nj  Hematuria not unusual given stent--monitor for resolution, monitor Hgb  F/u with  in 2 weeks  Continue abx as below     Septic shock  Klebsiella bacteremia  Consulted ID  They recommend abx through 11/12 which would complete 10 day course  Currently on IV Rocephin which will finish today.     EMMA/CKD3a  Renal following  Cr normalized  Started on small dose of IV Lasix     Hypernatremia  Resolved. Nephrology following as above.  Encourage oral water intake     Hypokalemia  Renal replacing     Thrombocytopenia  Due to sepsis  Resolved     Chronic diastolic CHF  Type 2 NSTEMI  Mod aortic insufficiency  LAFB  Ascending aortic aneurysm  Card has followed and signed off--nothing further to add     IBS  Start gentle stool softener     Acute resp failure with hypoxia  Right pleural effusion  Weaning O2 as able  Continue scheduled DuoNebs  Pulm ordered thoracentesis but not enough to tap per radiology     LUE superficial thrombophlebitis  Supportive care and ASA     Dysphagia  Diet has been initiated.  Poor intake.  Not much of an appetite.  With the complaints of nausea I will hold off on nocturnal tube feeds tonight and check a KUB.  Perhaps getting off the tube feeds will help with her appetite.  Will leave cortrak in place, however.     Depression  Psych following     Reports of nausea per daughter. Continue to treat with PRN agents. May consider scopolamine. Patient states she is having bowel movements. Could be medication related. Continue to monitor for now.      SCDs for DVT prophylaxis.  Full code.  Discussed with patient, daughter at bedside and nurse.  Anticipate discharge to SNF/ARF-  likely not for another 2-3 days pending her progress and ability to take PO.      Expected Discharge Date: 11/13/2024; Expected Discharge Time:  3:00 PM     José Becker MD  [facility]  11/12/24  12:31 EST

## 2024-11-12 NOTE — PLAN OF CARE
Goal Outcome Evaluation:  Plan of Care Reviewed With: patient, child           Outcome Evaluation: Re-eval of swallow completed. Recommend mechanical soft ground and no mixed consistencies. Recommend meds whole/crushed with puree at tolerated. Recommend supervision, slow rate, alternate liquids/solids. SLP to follow for diet tolerance and re-eval as appropriate.    Anticipated Discharge Disposition (SLP): anticipate therapy at next level of care          SLP Swallowing Diagnosis: oral dysphagia, suspected pharyngeal dysphagia (11/12/24 0002)

## 2024-11-12 NOTE — PLAN OF CARE
Goal Outcome Evaluation:  Plan of Care Reviewed With: patient        Progress: improving  Outcome Evaluation: Pt demos slow but steady progress with strength and endurance, as evidenced by tolerance of increased activity today. Pt performed bed mobility with mod A and STS transfers with mod A x2 and rwx. Pt was able to take a few side steps to HOB with mod A x2 and rwx. Pt continues to fatigue quickly with upright mobility and will continue to benefit from PT to address strength and overall mobility.    Anticipated Discharge Disposition (PT): inpatient rehabilitation facility

## 2024-11-12 NOTE — PROGRESS NOTES
Access attempted follow up. Pt unavailable at that time. Review of chart shows pt has been accepted at [facility] when she is ready to dc. Access will continue to follow.

## 2024-11-12 NOTE — NURSING NOTE
Patient complaining of ABD discomfort. Hospitalist paged and made aware. Residual checked; unable to withdraw any residual. Hospitalist gave order to stop tube feeds and placed order for medications. Tube feeds stopped at this time.

## 2024-11-12 NOTE — THERAPY RE-EVALUATION
Acute Care - Speech Language Pathology   Swallow Re-Evaluation [facility]     Patient Name: Kristyn Lugo  : 1940  MRN: [id number]  Today's Date: 2024               Admit Date: 11/3/2024    Visit Dx:     ICD-10-CM ICD-9-CM   1. Septic shock  A41.9 038.9    R65.21 785.52     995.92   2. Acute kidney injury  N17.9 584.9   3. Occult GI bleeding  R19.5 792.1   4. Acute respiratory failure with hypoxia  J96.01 518.81   5. Acute pyelonephritis  N10 590.10   6. Kidney stone on right side, distal UVJ with hydronephrosis  N20.0 592.0     Patient Active Problem List   Diagnosis    Lumbar spinal stenosis    Spondylolisthesis of lumbar region    Acute pyelonephritis    Primary hypertension    HLD (hyperlipidemia)    Chronic diastolic CHF (congestive heart failure)    Irritable 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    Hydronephrosis of right kidney    Right ureteral stone    Acute respiratory failure with hypoxia    Thrombocytopenia    Bacteremia due to Klebsiella pneumoniae    Shock, septic    EMMA (acute kidney injury)    Stage 3a chronic kidney disease    Hypernatremia    Superficial thrombophlebitis of left upper extremity    Hypokalemia     Past Medical History:   Diagnosis Date    Aortic valve regurgitation     Chronic kidney disease     Low back pain      Past Surgical History:   Procedure Laterality Date    CYSTOSCOPY W/ URETERAL STENT PLACEMENT Right 11/3/2024    Procedure: CYSTOSCOPY URETERAL CATHETER/STENT INSERTION;  Surgeon: Tony Ledesma MD;  Location: [facility];  Service: Urology;  Laterality: Right;       SLP Recommendation and Plan  SLP Swallowing Diagnosis: oral dysphagia, suspected pharyngeal dysphagia (24 1130)  SLP Diet Recommendation: mechanical ground textures, no mixed consistencies, thin liquids (24 1130)  Recommended Precautions and Strategies: upright posture during/after eating, small bites of food and sips of liquid, general aspiration  precautions, reflux precautions, assist with feeding (11/12/24 1130)  SLP Rec. for Method of Medication Administration: meds whole, meds crushed, with puree, as tolerated (11/12/24 1130)     Monitor for Signs of Aspiration: yes, notify SLP if any concerns (11/12/24 1130)  Recommended Diagnostics: reassess via clinical swallow evaluation (11/12/24 1130)  Swallow Criteria for Skilled Therapeutic Interventions Met: demonstrates skilled criteria (11/12/24 1130)  Anticipated Discharge Disposition (SLP): anticipate therapy at next level of care (11/12/24 1130)  Rehab Potential/Prognosis, Swallowing: good, to achieve stated therapy goals (11/12/24 1130)  Therapy Frequency (Swallow): PRN (11/12/24 1130)  Predicted Duration Therapy Intervention (Days): until discharge (11/12/24 1130)  Oral Care Recommendations: Oral Care BID/PRN (11/12/24 1130)                                        Outcome Evaluation: Re-eval of swallow completed. Recommend mechanical soft ground and no mixed consistencies. Recommend meds whole/crushed with puree at tolerated. Recommend supervision, slow rate, alternate liquids/solids. SLP to follow for diet tolerance and re-eval as appropriate.      SWALLOW EVALUATION (Last 72 Hours)       SLP Adult Swallow Evaluation       Row Name 11/12/24 1130                   Rehab Evaluation    Document Type re-evaluation  -OC        Subjective Information no complaints  -OC        Patient Observations alert;cooperative;agree to therapy  -OC        Patient Effort good  -OC        Symptoms Noted During/After Treatment none  -OC           General Information    Patient Profile Reviewed yes  -OC        Current Method of Nutrition thin liquids;pureed  -OC        Precautions/Limitations, Vision WFL  -OC        Precautions/Limitations, Hearing WFL  -OC        Prior Level of Function-Communication WFL  -OC        Prior Level of Function-Swallowing NPO  -OC        Plans/Goals Discussed with patient and family;agreed upon   -OC        Barriers to Rehab medically complex  -OC        Patient's Goals for Discharge patient did not state  -OC        Family Goals for Discharge patient able to return to regular diet  -OC           Pain    Pretreatment Pain Rating 0/10 - no pain  -OC        Posttreatment Pain Rating 0/10 - no pain  -OC           Oral Motor Structure and Function    Dentition Assessment natural, present and adequate  -OC        Secretion Management WNL/WFL  -OC        Mucosal Quality moist, healthy  -OC        Volitional Swallow WFL  -OC        Volitional Cough WFL  -OC           Oral Musculature and Cranial Nerve Assessment    Oral Motor General Assessment generalized oral motor weakness  -OC           Clinical Swallow Eval    Clinical Swallow Evaluation Summary Re-eval of swallow completed. Patient alert with lunch tray. No overt s/s aspiration with thin via straw and puree textures. Functional slef feeding and apropriate rate/bite size. Patient demonstrated no overt s/s aspiration with drained trials mechanical soft. Patient demonstrated throat clear with mixed consistencies.  -OC           SLP Evaluation Clinical Impression    SLP Swallowing Diagnosis oral dysphagia;suspected pharyngeal dysphagia  -OC        Functional Impact risk of aspiration/pneumonia  -OC        Rehab Potential/Prognosis, Swallowing good, to achieve stated therapy goals  -OC        Swallow Criteria for Skilled Therapeutic Interventions Met demonstrates skilled criteria  -OC           Recommendations    Therapy Frequency (Swallow) PRN  -OC        Predicted Duration Therapy Intervention (Days) until discharge  -OC        SLP Diet Recommendation mechanical ground textures;no mixed consistencies;thin liquids  -OC        Recommended Diagnostics reassess via clinical swallow evaluation  -OC        Recommended Precautions and Strategies upright posture during/after eating;small bites of food and sips of liquid;general aspiration precautions;reflux  precautions;assist with feeding  -OC        Oral Care Recommendations Oral Care BID/PRN  -OC        SLP Rec. for Method of Medication Administration meds whole;meds crushed;with puree;as tolerated  -OC        Monitor for Signs of Aspiration yes;notify SLP if any concerns  -OC        Anticipated Discharge Disposition (SLP) anticipate therapy at next level of care  -OC           Swallow Goals (SLP)    Swallow LTGs Swallow Long Term Goal (free text)  -OC           (LTG) Swallow    (LTG) Swallow Pt will tolerate safest and least restrictive diet  -OC        [place] (Swallow Long Term Goal) independently (over 90% accuracy)  -OC        Time Frame (Swallow Long Term Goal) by discharge  -OC                  User Key  (r) = Recorded By, (t) = Taken By, (c) = Cosigned By      Initials Name Effective Dates    Colleen Alvarado SLP 08/28/23 -                     EDUCATION  The patient has been educated in the following areas:   Dysphagia (Swallowing Impairment).        SLP GOALS       Row Name 11/12/24 1130             (LTG) Swallow    (LTG) Swallow Pt will tolerate safest and least restrictive diet  -OC      [place] (Swallow Long Term Goal) independently (over 90% accuracy)  -OC      Time Frame (Swallow Long Term Goal) by discharge  -OC                User Key  (r) = Recorded By, (t) = Taken By, (c) = Cosigned By      Initials Name Provider Type    Colleen Alvarado SLP Speech and Language Pathologist                         Time Calculation:    Time Calculation- SLP       Row Name 11/12/24 1300             Time Calculation- SLP    SLP Start Time 1300  -OC      SLP Received On 11/12/24  -OC         Untimed Charges    SLP Treatment ST Treatment Swallow Minutes  - 34638  -OC      [id number] Treatment Swallow Minutes 60  -OC         Total Minutes    Untimed Charges Total Minutes 60  -OC       Total Minutes 60  -OC                User Key  (r) = Recorded By, (t) = Taken By, (c) = Cosigned By      Initials Name Provider Type     OC Colleen Dupont SLP Speech and Language Pathologist                    Therapy Charges for Today       Code Description Service Date Service Provider Modifiers Qty    [id number] HC ST TREATMENT SWALLOW 3 11/12/2024 Colleen Dupont SLP GN 1                 HASMUKH Garcia  11/12/2024

## 2024-11-13 LAB
ANION GAP SERPL CALCULATED.3IONS-SCNC: 6.4 MMOL/L (ref 5–15)
BUN SERPL-MCNC: 32 MG/DL (ref 8–23)
BUN/CREAT SERPL: 37.2 (ref 7–25)
CALCIUM SPEC-SCNC: 9.1 MG/DL (ref 8.6–10.5)
CHLORIDE SERPL-SCNC: 104 MMOL/L (ref 98–107)
CO2 SERPL-SCNC: 30.6 MMOL/L (ref 22–29)
CREAT SERPL-MCNC: 0.86 MG/DL (ref 0.57–1)
DEPRECATED RDW RBC AUTO: 44.8 FL (ref 37–54)
EGFRCR SERPLBLD CKD-EPI 2021: 66.3 ML/MIN/1.73
ERYTHROCYTE [DISTWIDTH] IN BLOOD BY AUTOMATED COUNT: 14 % (ref 12.3–15.4)
GLUCOSE SERPL-MCNC: 95 MG/DL (ref 65–99)
HCT VFR BLD AUTO: 27.1 % (ref 34–46.6)
HGB BLD-MCNC: 8.7 G/DL (ref 12–15.9)
MCH RBC QN AUTO: 28.2 PG (ref 26.6–33)
MCHC RBC AUTO-ENTMCNC: 32.1 G/DL (ref 31.5–35.7)
MCV RBC AUTO: 88 FL (ref 79–97)
PLATELET # BLD AUTO: 215 10*3/MM3 (ref 140–450)
PMV BLD AUTO: 11.3 FL (ref 6–12)
POTASSIUM SERPL-SCNC: 4.6 MMOL/L (ref 3.5–5.2)
PROCALCITONIN SERPL-MCNC: 0.67 NG/ML (ref 0–0.25)
RBC # BLD AUTO: 3.08 10*6/MM3 (ref 3.77–5.28)
SODIUM SERPL-SCNC: 141 MMOL/L (ref 136–145)
WBC NRBC COR # BLD AUTO: 9.22 10*3/MM3 (ref 3.4–10.8)

## 2024-11-13 PROCEDURE — 25010000002 ONDANSETRON PER 1 MG

## 2024-11-13 PROCEDURE — 84145 PROCALCITONIN (PCT): CPT | Performed by: INTERNAL MEDICINE

## 2024-11-13 PROCEDURE — 94664 DEMO&/EVAL PT USE INHALER: CPT

## 2024-11-13 PROCEDURE — 85027 COMPLETE CBC AUTOMATED: CPT | Performed by: INTERNAL MEDICINE

## 2024-11-13 PROCEDURE — 97530 THERAPEUTIC ACTIVITIES: CPT

## 2024-11-13 PROCEDURE — 94799 UNLISTED PULMONARY SVC/PX: CPT

## 2024-11-13 PROCEDURE — 80048 BASIC METABOLIC PNL TOTAL CA: CPT | Performed by: INTERNAL MEDICINE

## 2024-11-13 PROCEDURE — 94761 N-INVAS EAR/PLS OXIMETRY MLT: CPT

## 2024-11-13 RX ORDER — BISACODYL 10 MG
10 SUPPOSITORY, RECTAL RECTAL DAILY PRN
Status: DISCONTINUED | OUTPATIENT
Start: 2024-11-13 | End: 2024-11-15 | Stop reason: HOSPADM

## 2024-11-13 RX ORDER — ASPIRIN 81 MG/1
81 TABLET, CHEWABLE ORAL DAILY
Status: DISCONTINUED | OUTPATIENT
Start: 2024-11-14 | End: 2024-11-15 | Stop reason: HOSPADM

## 2024-11-13 RX ORDER — LANOLIN ALCOHOL/MO/W.PET/CERES
400 CREAM (GRAM) TOPICAL DAILY
Status: DISCONTINUED | OUTPATIENT
Start: 2024-11-14 | End: 2024-11-15 | Stop reason: HOSPADM

## 2024-11-13 RX ORDER — POLYETHYLENE GLYCOL 3350 17 G/17G
17 POWDER, FOR SOLUTION ORAL DAILY
Status: DISCONTINUED | OUTPATIENT
Start: 2024-11-13 | End: 2024-11-15 | Stop reason: HOSPADM

## 2024-11-13 RX ORDER — FUROSEMIDE 40 MG/1
20 TABLET ORAL DAILY
Status: DISCONTINUED | OUTPATIENT
Start: 2024-11-13 | End: 2024-11-15 | Stop reason: HOSPADM

## 2024-11-13 RX ADMIN — ONDANSETRON 4 MG: 2 INJECTION, SOLUTION INTRAMUSCULAR; INTRAVENOUS at 23:54

## 2024-11-13 RX ADMIN — METOLAZONE 2.5 MG: 2.5 TABLET ORAL at 08:37

## 2024-11-13 RX ADMIN — IPRATROPIUM BROMIDE AND ALBUTEROL SULFATE 3 ML: 2.5; .5 SOLUTION RESPIRATORY (INHALATION) at 11:21

## 2024-11-13 RX ADMIN — LANSOPRAZOLE 30 MG: 15 TABLET, ORALLY DISINTEGRATING ORAL at 06:22

## 2024-11-13 RX ADMIN — POLYETHYLENE GLYCOL 3350 17 G: 17 POWDER, FOR SOLUTION ORAL at 12:08

## 2024-11-13 RX ADMIN — IPRATROPIUM BROMIDE AND ALBUTEROL SULFATE 3 ML: 2.5; .5 SOLUTION RESPIRATORY (INHALATION) at 19:55

## 2024-11-13 RX ADMIN — ASPIRIN 81 MG: 81 TABLET, CHEWABLE ORAL at 08:37

## 2024-11-13 RX ADMIN — TRAMADOL HYDROCHLORIDE 50 MG: 50 TABLET, FILM COATED ORAL at 08:48

## 2024-11-13 RX ADMIN — Medication 10 ML: at 08:37

## 2024-11-13 RX ADMIN — Medication 400 MCG: at 08:37

## 2024-11-13 RX ADMIN — SENNOSIDES AND DOCUSATE SODIUM 2 TABLET: 50; 8.6 TABLET ORAL at 08:37

## 2024-11-13 RX ADMIN — FUROSEMIDE 20 MG: 40 TABLET ORAL at 15:26

## 2024-11-13 RX ADMIN — DIPHENHYDRAMINE HYDROCHLORIDE AND LIDOCAINE HYDROCHLORIDE AND ALUMINUM HYDROXIDE AND MAGNESIUM HYDRO 10 ML: KIT at 06:22

## 2024-11-13 RX ADMIN — ACETAMINOPHEN 325MG 650 MG: 325 TABLET ORAL at 23:54

## 2024-11-13 RX ADMIN — Medication 10 ML: at 21:00

## 2024-11-13 RX ADMIN — DIPHENHYDRAMINE HYDROCHLORIDE AND LIDOCAINE HYDROCHLORIDE AND ALUMINUM HYDROXIDE AND MAGNESIUM HYDRO 10 ML: KIT at 12:55

## 2024-11-13 NOTE — PLAN OF CARE
Goal Outcome Evaluation:           Progress: improving         Patient without any acute complications overnight. Urine output  mls/hr. Patient without any complaints of pain or nausea overnight. Patient min assist with turning in bed. Patient assisted with weight shifting/turning q2hrs. Chg wipes completed with [facility] care. Patient care ongoing.       Problem: Adult Inpatient Plan of Care  Goal: Plan of Care Review  Outcome: Progressing  Flowsheets (Taken 11/13/2024 0548)  Progress: improving  Goal: Patient-Specific Goal (Individualized)  Outcome: Progressing  Goal: Absence of Hospital-Acquired Illness or Injury  Outcome: Progressing  Intervention: Identify and Manage Fall Risk  Recent Flowsheet Documentation  Taken 11/13/2024 0445 by Keon Duarte II, RN  Safety Promotion/Fall Prevention:   activity supervised   safety round/check completed   assistive device/personal items within reach   clutter free environment maintained   fall prevention program maintained   muscle strengthening facilitated   nonskid shoes/slippers when out of bed   room organization consistent  Taken 11/13/2024 0200 by Keon Duarte II, RN  Safety Promotion/Fall Prevention:   activity supervised   safety round/check completed   assistive device/personal items within reach   clutter free environment maintained   fall prevention program maintained   muscle strengthening facilitated   nonskid shoes/slippers when out of bed   room organization consistent  Taken 11/13/2024 0005 by Keon Duarte II, RN  Safety Promotion/Fall Prevention:   activity supervised   safety round/check completed   assistive device/personal items within reach   clutter free environment maintained   fall prevention program maintained   muscle strengthening facilitated   nonskid shoes/slippers when out of bed   room organization consistent  Taken 11/12/2024 2200 by Keon Duarte II, RN  Safety Promotion/Fall Prevention:   activity supervised   safety round/check  completed   assistive device/personal items within reach   clutter free environment maintained   fall prevention program maintained   muscle strengthening facilitated   nonskid shoes/slippers when out of bed   room organization consistent  Taken 11/12/2024 2000 by Keon Duarte II, RN  Safety Promotion/Fall Prevention:   activity supervised   safety round/check completed   assistive device/personal items within reach   clutter free environment maintained   fall prevention program maintained   muscle strengthening facilitated   nonskid shoes/slippers when out of bed   room organization consistent  Intervention: Prevent Skin Injury  Recent Flowsheet Documentation  Taken 11/13/2024 0445 by Keon Duarte II, RN  Body Position:   position changed independently   turned   left   heels elevated  Taken 11/13/2024 0200 by Keon Duarte II, RN  Body Position:   position changed independently   weight shifting   turned   right  Taken 11/13/2024 0005 by Keon Duarte II, RN  Body Position:   position changed independently   weight shifting   turned   left  Taken 11/12/2024 2200 by Keon Duarte II, RN  Body Position:   position changed independently   turned   right   weight shifting   upper extremity elevated   heels elevated  Taken 11/12/2024 2000 by Keon Duarte II, RN  Body Position:   position changed independently   left   turned   upper extremity elevated   weight shifting   heels elevated  Skin Protection:   transparent dressing maintained   incontinence pads utilized  Intervention: Prevent and Manage VTE (Venous Thromboembolism) Risk  Recent Flowsheet Documentation  Taken 11/12/2024 2000 by Keon Duarte II, RN  VTE Prevention/Management: SCDs (sequential compression devices) on  Intervention: Prevent Infection  Recent Flowsheet Documentation  Taken 11/13/2024 0445 by Keon Duarte II, RN  Infection Prevention:   personal protective equipment utilized   hand hygiene promoted   rest/sleep promoted   single patient room  provided  Taken 11/13/2024 0200 by Keon Duarte II, RN  Infection Prevention:   personal protective equipment utilized   hand hygiene promoted   rest/sleep promoted   single patient room provided  Taken 11/13/2024 0005 by Keon Duarte II, RN  Infection Prevention:   personal protective equipment utilized   hand hygiene promoted   rest/sleep promoted   single patient room provided  Taken 11/12/2024 2200 by Keon Duarte II, RN  Infection Prevention:   personal protective equipment utilized   hand hygiene promoted   rest/sleep promoted   single patient room provided  Taken 11/12/2024 2000 by Keon Duarte II, RN  Infection Prevention:   personal protective equipment utilized   hand hygiene promoted   rest/sleep promoted   single patient room provided  Goal: Optimal Comfort and Wellbeing  Outcome: Progressing  Intervention: Monitor Pain and Promote Comfort  Recent Flowsheet Documentation  Taken 11/12/2024 2200 by Keon Duarte II, RN  Pain Management Interventions: medication offered but refused  Taken 11/12/2024 2000 by Keon Duarte II, RN  Pain Management Interventions: medication offered but refused  Intervention: Provide Person-Centered Care  Recent Flowsheet Documentation  Taken 11/12/2024 2000 by Keon Duarte II, RN  Trust Relationship/Rapport:   care explained   choices provided   emotional support provided   empathic listening provided   questions answered   questions encouraged   reassurance provided   thoughts/feelings acknowledged  Goal: Readiness for Transition of Care  Outcome: Progressing  Intervention: Mutually Develop Transition Plan  Recent Flowsheet Documentation  Taken 11/13/2024 0200 by Keon Duarte II, RN  Equipment Currently Used at Home: walker, standard     Problem: Fall Injury Risk  Goal: Absence of Fall and Fall-Related Injury  Outcome: Progressing  Intervention: Identify and Manage Contributors  Recent Flowsheet Documentation  Taken 11/12/2024 2000 by Keon Duarte II, RN  Medication  Review/Management:   medications reviewed   high-risk medications identified  Intervention: Promote Injury-Free Environment  Recent Flowsheet Documentation  Taken 11/13/2024 0445 by Keon Duarte II, RN  Safety Promotion/Fall Prevention:   activity supervised   safety round/check completed   assistive device/personal items within reach   clutter free environment maintained   fall prevention program maintained   muscle strengthening facilitated   nonskid shoes/slippers when out of bed   room organization consistent  Taken 11/13/2024 0200 by Keon Duarte II RN  Safety Promotion/Fall Prevention:   activity supervised   safety round/check completed   assistive device/personal items within reach   clutter free environment maintained   fall prevention program maintained   muscle strengthening facilitated   nonskid shoes/slippers when out of bed   room organization consistent  Taken 11/13/2024 0005 by Keon Duarte II, RN  Safety Promotion/Fall Prevention:   activity supervised   safety round/check completed   assistive device/personal items within reach   clutter free environment maintained   fall prevention program maintained   muscle strengthening facilitated   nonskid shoes/slippers when out of bed   room organization consistent  Taken 11/12/2024 2200 by Keon Duarte II, RN  Safety Promotion/Fall Prevention:   activity supervised   safety round/check completed   assistive device/personal items within reach   clutter free environment maintained   fall prevention program maintained   muscle strengthening facilitated   nonskid shoes/slippers when out of bed   room organization consistent  Taken 11/12/2024 2000 by Keon Duarte II, RN  Safety Promotion/Fall Prevention:   activity supervised   safety round/check completed   assistive device/personal items within reach   clutter free environment maintained   fall prevention program maintained   muscle strengthening facilitated   nonskid shoes/slippers when out of bed    room organization consistent     Problem: Skin Injury Risk Increased  Goal: Skin Health and Integrity  Outcome: Progressing  Intervention: Optimize Skin Protection  Recent Flowsheet Documentation  Taken 11/13/2024 0445 by Keon Duarte II, RN  Head of Bed (HOB) Positioning: HOB at 45 degrees  Taken 11/13/2024 0200 by Keon Duarte II, RN  Head of Bed (HOB) Positioning: HOB at 30-45 degrees  Taken 11/13/2024 0005 by Keon Duarte II, RN  Head of Bed (HOB) Positioning: HOB at 30-45 degrees  Taken 11/12/2024 2200 by Keon Duarte II, RN  Head of Bed (HOB) Positioning: HOB at 30-45 degrees  Taken 11/12/2024 2000 by Keon Duarte II, RN  Activity Management: activity encouraged  Pressure Reduction Techniques:   frequent weight shift encouraged   heels elevated off bed   weight shift assistance provided  Head of Bed (HOB) Positioning: HOB at 30-45 degrees  Pressure Reduction Devices:   heel offloading device utilized   specialty bed utilized   pressure-redistributing mattress utilized  Skin Protection:   transparent dressing maintained   incontinence pads utilized  Intervention: Promote and Optimize Oral Intake  Recent Flowsheet Documentation  Taken 11/12/2024 1800 by Keon Duarte II, RN  Nutrition Interventions: (Magic cup provided) supplemental drinks provided     Problem: Restraint, Nonviolent  Goal: Absence of Harm or Injury  Outcome: Progressing  Intervention: Implement Least Restrictive Safety Strategies  Recent Flowsheet Documentation  Taken 11/13/2024 0445 by Keon Duarte II, RN  Medical Device Protection:   torso covered   tubing secured   IV pole/bag removed from visual field  Taken 11/13/2024 0200 by Keon Duarte II, RN  Medical Device Protection:   torso covered   tubing secured   IV pole/bag removed from visual field  Taken 11/13/2024 0005 by Keon Duarte II, RN  Medical Device Protection:   torso covered   tubing secured   IV pole/bag removed from visual field  Taken 11/12/2024 2200 by Keon Duarte II  RN  Medical Device Protection:   IV pole/bag removed from visual field   torso covered   tubing secured  Taken 11/12/2024 2000 by Keon Duarte II, RN  Medical Device Protection:   IV pole/bag removed from visual field   torso covered   tubing secured  Diversional Activities: television  Intervention: Protect Dignity, Rights and Personal Wellbeing  Recent Flowsheet Documentation  Taken 11/12/2024 2000 by Keon Duarte II, RN  Trust Relationship/Rapport:   care explained   choices provided   emotional support provided   empathic listening provided   questions answered   questions encouraged   reassurance provided   thoughts/feelings acknowledged  Intervention: Protect Skin and Joint Integrity  Recent Flowsheet Documentation  Taken 11/13/2024 0445 by Keon Duarte II, RN  Body Position:   position changed independently   turned   left   heels elevated  Taken 11/13/2024 0200 by Keon Duarte II, RN  Body Position:   position changed independently   weight shifting   turned   right  Taken 11/13/2024 0005 by Keon Duarte II, RN  Body Position:   position changed independently   weight shifting   turned   left  Taken 11/12/2024 2200 by Keon Duarte II, RN  Body Position:   position changed independently   turned   right   weight shifting   upper extremity elevated   heels elevated  Taken 11/12/2024 2000 by Keon Duarte II, RN  Body Position:   position changed independently   left   turned   upper extremity elevated   weight shifting   heels elevated  Skin Protection:   transparent dressing maintained   incontinence pads utilized  Range of Motion:   active ROM (range of motion) encouraged   ROM (range of motion) performed     Problem: Mechanical Ventilation Invasive  Goal: Optimal Nutrition Delivery  Outcome: Progressing  Goal: Absence of Device-Related Skin and Tissue Injury  Outcome: Progressing  Intervention: Maintain Skin and Tissue Health  Recent Flowsheet Documentation  Taken 11/12/2024 2000 by Keon Duarte II  RN  Device Skin Pressure Protection:   absorbent pad utilized/changed   skin-to-device areas padded   positioning supports utilized   pressure points protected  Goal: Absence of Ventilator-Induced Lung Injury  Outcome: Progressing  Intervention: Prevent Ventilator-Associated Pneumonia  Recent Flowsheet Documentation  Taken 11/13/2024 0445 by Keon Duarte II, RN  Head of Bed (HOB) Positioning: HOB at 45 degrees  Taken 11/13/2024 0200 by Keon Duarte II, RN  Head of Bed (HOB) Positioning: HOB at 30-45 degrees  Taken 11/13/2024 0005 by Keon Duarte II, RN  Head of Bed (HOB) Positioning: HOB at 30-45 degrees  Taken 11/12/2024 2200 by Keon Duarte II, RN  Head of Bed (HOB) Positioning: HOB at 30-45 degrees  Taken 11/12/2024 2000 by Keon Duarte II, RN  Head of Bed (HOB) Positioning: HOB at 30-45 degrees     Problem: Sepsis/Septic Shock  Goal: Optimal Coping  Outcome: Progressing  Intervention: Support Patient and Family Response  Recent Flowsheet Documentation  Taken 11/12/2024 2000 by Keon Duarte II, RN  Supportive Measures:   active listening utilized   decision-making supported   positive reinforcement provided   verbalization of feelings encouraged   goal-setting facilitated  Family/Support System Care:   presence promoted   self-care encouraged   support provided  Goal: Absence of Bleeding  Outcome: Progressing  Goal: Blood Glucose Level Within Target Range  Outcome: Progressing  Intervention: Optimize Glycemic Control  Recent Flowsheet Documentation  Taken 11/12/2024 2000 by Keon Duarte II, RN  Hyperglycemia Management: blood glucose monitored  Hypoglycemia Management: blood glucose monitored  Goal: Absence of Infection Signs and Symptoms  Outcome: Progressing  Intervention: Initiate Sepsis Management  Recent Flowsheet Documentation  Taken 11/13/2024 0445 by Keon Duarte II, RN  Infection Prevention:   personal protective equipment utilized   hand hygiene promoted   rest/sleep promoted   single patient  room provided  Taken 11/13/2024 0200 by Keon Duarte II, RN  Infection Prevention:   personal protective equipment utilized   hand hygiene promoted   rest/sleep promoted   single patient room provided  Taken 11/13/2024 0005 by Keon Duarte II, RN  Infection Prevention:   personal protective equipment utilized   hand hygiene promoted   rest/sleep promoted   single patient room provided  Taken 11/12/2024 2200 by Keon Duarte II, RN  Infection Prevention:   personal protective equipment utilized   hand hygiene promoted   rest/sleep promoted   single patient room provided  Taken 11/12/2024 2000 by Keon Duarte II, RN  Infection Prevention:   personal protective equipment utilized   hand hygiene promoted   rest/sleep promoted   single patient room provided  Intervention: Promote Recovery  Recent Flowsheet Documentation  Taken 11/12/2024 2000 by Keon Duarte II, RN  Activity Management: activity encouraged  Airway/Ventilation Management:   airway patency maintained   pulmonary hygiene promoted  Sleep/Rest Enhancement:   awakenings minimized   consistent schedule promoted   noise level reduced   regular sleep/rest pattern promoted   room darkened  Goal: Optimal Nutrition Delivery  Outcome: Progressing  Intervention: Optimize Nutrition Delivery  Recent Flowsheet Documentation  Taken 11/12/2024 1800 by Keon Duarte II, RN  Nutrition Interventions: (Magic cup provided) supplemental drinks provided     Problem: Enteral Nutrition  Goal: Absence of Aspiration Signs and Symptoms  Outcome: Progressing  Intervention: Minimize Aspiration Risk  Recent Flowsheet Documentation  Taken 11/13/2024 0445 by Keon Duarte II, RN  Head of Bed (HOB) Positioning: HOB at 45 degrees  Taken 11/13/2024 0200 by Keon Duarte II, RN  Head of Bed (HOB) Positioning: HOB at 30-45 degrees  Taken 11/13/2024 0005 by Keon Duarte II, RN  Head of Bed (HOB) Positioning: HOB at 30-45 degrees  Taken 11/12/2024 2200 by Keon Duarte II, RN  Head of Bed  ([facility]) Positioning: HOB at 30-45 degrees  Taken 11/12/2024 2000 by Keon Duarte II RN  Head of Bed ([facility]) Positioning: HOB at 30-45 degrees  Goal: Safe, Effective Therapy Delivery  Outcome: Progressing  Goal: Feeding Tolerance  Outcome: Progressing     Problem: Comorbidity Management  Goal: Blood Glucose Level Within Target Range  Outcome: Progressing  Intervention: Monitor and Manage Glycemia  Recent Flowsheet Documentation  Taken 11/12/2024 2000 by Keon Duarte II RN  Medication Review/Management:   medications reviewed   high-risk medications identified  Goal: Maintenance of Heart Failure Symptom Control  Outcome: Progressing  Intervention: Maintain Heart Failure Management  Recent Flowsheet Documentation  Taken 11/12/2024 2000 by Keon Duarte II RN  Medication Review/Management:   medications reviewed   high-risk medications identified  Goal: Blood Pressure in Desired Range  Outcome: Progressing  Intervention: Maintain Blood Pressure Management  Recent Flowsheet Documentation  Taken 11/12/2024 2000 by Keon Duarte II RN  Medication Review/Management:   medications reviewed   high-risk medications identified  Goal: Maintenance of Osteoarthritis Symptom Control  Outcome: Progressing  Intervention: Maintain Osteoarthritis Symptom Control  Recent Flowsheet Documentation  Taken 11/12/2024 2000 by Keon Duarte II RN  Activity Management: activity encouraged  Medication Review/Management:   medications reviewed   high-risk medications identified

## 2024-11-13 NOTE — PROGRESS NOTES
[facility] follow up d/t depression; this writer reviewed chart and spoke with RN Keon. Per RN, patient is doing great, no pain, anxiety/depression, and/or mentation concerns noted; she slept overnight.  Discharge to SNF/ARF in 2 to 3 days; CCP involved and providing assistance. No further needs/concerns noted at this time per RN and/or medical team; [facility] [place] to continue following.

## 2024-11-13 NOTE — PLAN OF CARE
Goal Outcome Evaluation:  Plan of Care Reviewed With: patient        Progress: improving  Outcome Evaluation: Pt continues to demo slow but steady progress with strength and endurance. Pt able to perform transfers with less assistance today and tolerated increased activity. Pt was able to ambulate short distance in room with min A x2 and rwx. Pt continues to fatigue quickly with activity and will continue to benefit from PT for further strengthening and advancement of mobility.    Anticipated Discharge Disposition (PT): inpatient rehabilitation facility

## 2024-11-13 NOTE — THERAPY TREATMENT NOTE
Patient Name: Kristyn Lugo  : 1940    MRN: [id number]                              Today's Date: 2024       Admit Date: 11/3/2024    Visit Dx:     ICD-10-CM ICD-9-CM   1. Septic shock  A41.9 038.9    R65.21 785.52     995.92   2. Acute kidney injury  N17.9 584.9   3. Occult GI bleeding  R19.5 792.1   4. Acute respiratory failure with hypoxia  J96.01 518.81   5. Acute pyelonephritis  N10 590.10   6. Kidney stone on right side, distal UVJ with hydronephrosis  N20.0 592.0     Patient Active Problem List   Diagnosis    Lumbar spinal stenosis    Spondylolisthesis of lumbar region    Acute pyelonephritis    Primary hypertension    HLD (hyperlipidemia)    Chronic diastolic CHF (congestive heart failure)    Irritable 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    Hydronephrosis of right kidney    Right ureteral stone    Acute respiratory failure with hypoxia    Thrombocytopenia    Bacteremia due to Klebsiella pneumoniae    Shock, septic    EMMA (acute kidney injury)    Stage 3a chronic kidney disease    Hypernatremia    Superficial thrombophlebitis of left upper extremity    Hypokalemia     Past Medical History:   Diagnosis Date    Aortic valve regurgitation     Chronic kidney disease     Low back pain      Past Surgical History:   Procedure Laterality Date    CYSTOSCOPY W/ URETERAL STENT PLACEMENT Right 11/3/2024    Procedure: CYSTOSCOPY URETERAL CATHETER/STENT INSERTION;  Surgeon: Tony Ledesma MD;  Location: [facility];  Service: Urology;  Laterality: Right;      General Information       Row Name 24 1526          Physical Therapy Time and Intention    Document Type therapy note (daily note)  -EF     Mode of Treatment individual therapy;physical therapy  -EF       Row Name 24 1526          General Information    Existing Precautions/Restrictions fall  -EF     Barriers to Rehab hearing deficit  -EF       Row Name 24 1526          Cognition    Orientation Status  (Cognition) oriented x 3  -EF       Row Name 11/13/24 1526          Safety Issues/Impairments Affecting Functional Mobility    Impairments Affecting Function (Mobility) balance;endurance/activity tolerance;strength  -EF               User Key  (r) = Recorded By, (t) = Taken By, (c) = Cosigned By      Initials Name Provider Type    Mylnee Carias PT Physical Therapist                   Mobility       Row Name 11/13/24 1526          Bed Mobility    Comment, (Bed Mobility) not tested; pt up in chair  -EF       Row Name 11/13/24 1526          Sit-Stand Transfer    Sit-Stand [place] (Transfers) minimum assist (75% patient effort);2 person assist;verbal cues  -EF     Assistive Device (Sit-Stand Transfers) walker, front-wheeled  -EF     Comment, (Sit-Stand Transfer) cues for hand placement; STS x3 from EOB  -EF       Row Name 11/13/24 1526          Gait/Stairs (Locomotion)    [place] Level (Gait) minimum assist (75% patient effort);2 person assist;verbal cues  -EF     Assistive Device (Gait) walker, front-wheeled  -EF     Distance in Feet (Gait) 10  -EF     Deviations/Abnormal Patterns (Gait) cami decreased;stride length decreased  -EF     Bilateral Gait Deviations forward flexed posture;heel strike decreased  -EF     Comment, (Gait/Stairs) Cues for upright posture and walker placement. Chair follow for safety. Pt ambulated 10', then required seated rest break, then ambulated another 5'.  -EF               User Key  (r) = Recorded By, (t) = Taken By, (c) = Cosigned By      Initials Name Provider Type    Mylene Carias PT Physical Therapist                   Obj/Interventions       Row Name 11/13/24 1528          Motor Skills    Therapeutic Exercise --  Seated B ankle pumps, LAQ x10 reps  -EF               User Key  (r) = Recorded By, (t) = Taken By, (c) = Cosigned By      Initials Name Provider Type    Mylene Carias PT Physical Therapist                   Goals/Plan    No  documentation.                  Clinical Impression       Row Name 11/13/24 1528          Pain    Pretreatment Pain Rating 0/10 - no pain  -EF     Posttreatment Pain Rating 0/10 - no pain  -EF       Row Name 11/13/24 1528          Plan of Care Review    Plan of Care Reviewed With patient  -EF     Progress improving  -EF     Outcome Evaluation Pt continues to demo slow but steady progress with strength and endurance. Pt able to perform transfers with less assistance today and tolerated increased activity. Pt was able to ambulate short distance in room with min A x2 and rwx. Pt continues to fatigue quickly with activity and will continue to benefit from PT for further strengthening and advancement of mobility.  -EF       Row Name 11/13/24 1528          Positioning and Restraints    Pre-Treatment Position sitting in chair/recliner  -EF     Post Treatment Position chair  -EF     In Chair reclined;call light within reach;encouraged to call for assist;exit alarm on;with family/caregiver;notified nsg;legs elevated;LUE elevated;RUE elevated  -EF               User Key  (r) = Recorded By, (t) = Taken By, (c) = Cosigned By      Initials Name Provider Type    EF Mylene Marroquin, PT Physical Therapist                   Outcome Measures       Row Name 11/13/24 1523          How much help from another person do you currently need...    Turning from your back to your side while in flat bed without using bedrails? 3  -EF     Moving from lying on back to sitting on the side of a flat bed without bedrails? 3  -EF     Moving to and from a bed to a chair (including a wheelchair)? 2  -EF     Standing up from a chair using your arms (e.g., wheelchair, bedside chair)? 2  -EF     Climbing 3-5 steps with a railing? 1  -EF     To walk in hospital room? 2  -EF     AM-PAC 6 Clicks Score (PT) 13  -EF     Highest Level of Mobility Goal 4 --> Transfer to chair/commode  -EF       Row Name 11/13/24 1328          Functional Assessment    Outcome  Measure Options AM-PAC 6 Clicks Daily Activity (OT)  -               User Key  (r) = Recorded By, (t) = Taken By, (c) = Cosigned By      Initials Name Provider Type    EF Mylene Marroquin, PT Physical Therapist    Iman Duarte, OT Occupational Therapist                                 Physical Therapy Education       Title: PT OT SLP Therapies (In Progress)       Topic: Physical Therapy (Done)       Point: Mobility training (Done)       Learning Progress Summary            Patient Acceptance, E,TB, VU,NR by  at 11/13/2024 1529    Acceptance, E,TB, VU,NR by  at 11/12/2024 1429    Acceptance, E, VU,NR by  at 11/9/2024 1323                      Point: Home exercise program (Done)       Learning Progress Summary            Patient Acceptance, E,TB, VU,NR by  at 11/13/2024 1529    Acceptance, E, VU,NR by  at 11/9/2024 1323                      Point: Body mechanics (Done)       Learning Progress Summary            Patient Acceptance, E,TB, VU,NR by  at 11/12/2024 1429    Acceptance, E, VU,NR by  at 11/9/2024 1323                      Point: Precautions (Done)       Learning Progress Summary            Patient Acceptance, E, VU,NR by  at 11/9/2024 1323                                      User Key       Initials Effective Dates Name Provider Type Discipline     05/31/24 -  Mylene Marroquin, PT Physical Therapist PT     10/25/19 -  Sera Morales PT Physical Therapist PT                  PT Recommendation and Plan     Progress: improving  Outcome Evaluation: Pt continues to demo slow but steady progress with strength and endurance. Pt able to perform transfers with less assistance today and tolerated increased activity. Pt was able to ambulate short distance in room with min A x2 and rwx. Pt continues to fatigue quickly with activity and will continue to benefit from PT for further strengthening and advancement of mobility.     Time Calculation:         PT Charges       Row Name 11/13/24  1530             Time Calculation    Start Time 1340  -EF      Stop Time 1358  -EF      Time Calculation (min) 18 min  -EF      PT Received On 11/13/24  -EF      PT - Next Appointment 11/14/24  -EF         Time Calculation- PT    Total Timed Code Minutes- PT 18 minute(s)  -EF         Timed Charges    13404 - PT Therapeutic Activity Minutes 18  -EF         Total Minutes    Timed Charges Total Minutes 18  -EF       Total Minutes 18  -EF                User Key  (r) = Recorded By, (t) = Taken By, (c) = Cosigned By      Initials Name Provider Type    EF Mylene Marroquin, PT Physical Therapist                  Therapy Charges for Today       Code Description Service Date Service Provider Modifiers Qty    63720276933 HC PT THERAPEUTIC ACT EA 15 MIN 11/12/2024 Mylene Marroquin, PT GP 2    77257897682 HC PT THERAPEUTIC ACT EA 15 MIN 11/13/2024 Mylene Marroquin, PT GP 1            PT G-Codes  Outcome Measure Options: AM-PAC 6 Clicks Daily Activity (OT)  AM-PAC 6 Clicks Score (PT): 13  AM-PAC 6 Clicks Score (OT): 13  PT Discharge Summary  Anticipated Discharge Disposition (PT): inpatient rehabilitation facility    Mylene Marroquin PT  11/13/2024

## 2024-11-13 NOTE — PLAN OF CARE
Goal Outcome Evaluation:  Plan of Care Reviewed With: patient        Progress: improving  Outcome Evaluation: Pt is improving and is able to transfer with less assist today for ADLs and is able to transfer to [facility] and to chair with min A with rwx. Her activity tolerance and generalized weakness remain very limiting and she remains below her ADL baseline.    Anticipated Discharge Disposition (OT): inpatient rehabilitation facility

## 2024-11-13 NOTE — THERAPY TREATMENT NOTE
Patient Name: Kristyn Lugo  : 1940    MRN: [id number]                              Today's Date: 2024       Admit Date: 11/3/2024    Visit Dx:     ICD-10-CM ICD-9-CM   1. Septic shock  A41.9 038.9    R65.21 785.52     995.92   2. Acute kidney injury  N17.9 584.9   3. Occult GI bleeding  R19.5 792.1   4. Acute respiratory failure with hypoxia  J96.01 518.81   5. Acute pyelonephritis  N10 590.10   6. Kidney stone on right side, distal UVJ with hydronephrosis  N20.0 592.0     Patient Active Problem List   Diagnosis    Lumbar spinal stenosis    Spondylolisthesis of lumbar region    Acute pyelonephritis    Primary hypertension    HLD (hyperlipidemia)    Chronic diastolic CHF (congestive heart failure)    Irritable 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    Hydronephrosis of right kidney    Right ureteral stone    Acute respiratory failure with hypoxia    Thrombocytopenia    Bacteremia due to Klebsiella pneumoniae    Shock, septic    EMMA (acute kidney injury)    Stage 3a chronic kidney disease    Hypernatremia    Superficial thrombophlebitis of left upper extremity    Hypokalemia     Past Medical History:   Diagnosis Date    Aortic valve regurgitation     Chronic kidney disease     Low back pain      Past Surgical History:   Procedure Laterality Date    CYSTOSCOPY W/ URETERAL STENT PLACEMENT Right 11/3/2024    Procedure: CYSTOSCOPY URETERAL CATHETER/STENT INSERTION;  Surgeon: Tony Ledesma MD;  Location: [facility];  Service: Urology;  Laterality: Right;      General Information       Row Name 24 1325          OT Time and Intention    Document Type therapy note (daily note)  -SM     Mode of Treatment occupational therapy;individual therapy  -SM     Patient Effort good  -SM       Row Name 24 1325          General Information    Patient Profile Reviewed yes  -SM     Existing Precautions/Restrictions fall;oxygen therapy device and L/min  -SM     Barriers to Rehab  hearing deficit  -SM       Row Name 11/13/24 1325          Cognition    Orientation Status (Cognition) oriented x 3  -SM       Row Name 11/13/24 1325          Safety Issues/Impairments Affecting Functional Mobility    Impairments Affecting Function (Mobility) balance;endurance/activity tolerance;strength;shortness of breath  -               User Key  (r) = Recorded By, (t) = Taken By, (c) = Cosigned By      Initials Name Provider Type     Iman Lara OT Occupational Therapist                     Mobility/ADL's       [facility] 11/13/24 1325          Bed Mobility    Supine-Sit [place] (Bed Mobility) minimum assist (75% patient effort)  -SM       Row Name 11/13/24 1325          Transfers    Transfers toilet transfer  -SM       Row Name 11/13/24 1325          Bed-Chair Transfer    Bed-Chair [place] (Transfers) minimum assist (75% patient effort);verbal cues  -     Assistive Device (Bed-Chair Transfers) walker, front-wheeled  -SM       Row Name 11/13/24 1325          Stand-Sit Transfer    Stand-Sit [place] (Transfers) minimum assist (75% patient effort);verbal cues  -     Assistive Device (Stand-Sit Transfers) walker, front-wheeled  -SM       Row Name 11/13/24 1325          Toilet Transfer    [place] Level (Toilet Transfer) minimum assist (75% patient effort);verbal cues  -     Assistive Device (Toilet Transfer) walker, front-wheeled  -SM       Row Name 11/13/24 1325          Functional Mobility    Functional Mobility- Ind. Level minimum assist (75% patient effort)  -     Functional Mobility- Device walker, front-wheeled  [facility]     Functional Mobility-Distance (Feet) --  2  -     Functional Mobility- Comment very forward flexed posture, shuffles feet, poor tolerance to mobility  -SM       Row Name 11/13/24 1325          Lower Body Dressing Assessment/Training    [place] Level (Lower Body Dressing) dependent (less than 25% patient effort);socks;don  -SM       Row Name 11/13/24  1325          Grooming Assessment/Training    [place] Level (Grooming) set up;oral care regimen  -     Position (Grooming) supported sitting  -       Row Name 11/13/24 1325          Toileting Assessment/Training    Comment, (Toileting) transfered to [facility] but unable to void, nj cath, would require signficant assist 2/2 balance and hand edema  -               User Key  (r) = Recorded By, (t) = Taken By, (c) = Cosigned By      Initials Name Provider Type     Iman Lara OT Occupational Therapist                   Obj/Interventions       [facility] Name 11/13/24 132          Shoulder (Therapeutic Exercise)    Shoulder (Therapeutic Exercise) AROM (active range of motion)  -     Shoulder AROM (Therapeutic Exercise) bilateral;flexion;extension  forward punches  -[facility] Name 11/13/24 132          Hand (Therapeutic Exercise)    Hand (Therapeutic Exercise) AROM (active range of motion)  -     Hand AROM/AAROM (Therapeutic Exercise) AROM (active range of motion);finger flexion;finger extension;10 repetitions  -[facility] Name 11/13/24 [facility]          Motor Skills    Therapeutic Exercise shoulder;hand  -[facility] Name 11/13/24 [facility]          Balance    Static Sitting Balance standby assist  -     Position, Sitting Balance sitting edge of bed  -     Static Standing Balance contact guard  -     Dynamic Standing Balance minimal assist  -     Position/Device Used, Standing Balance supported;walker, rolling  -               User Key  (r) = Recorded By, (t) = Taken By, (c) = Cosigned By      Initials Name Provider Type     Iman Lara OT Occupational Therapist                   Goals/Plan    No documentation.                  Clinical Impression       [facility] Name 11/13/24 1327          Pain Assessment    Pre/Posttreatment Pain Comment reports pain in feet 2/2 edema. Worse when standing.  -     Additional Documentation Pain Scale: FACES Pre/Post-Treatment (Group)  -       Row Name  11/13/24 1327          Pain Scale: FACES Pre/Post-Treatment    Pain: FACES Scale, Pretreatment 4-->hurts little more  -     Posttreatment Pain Rating 4-->hurts little more  -SM       Row Name 11/13/24 1327          Plan of Care Review    Plan of Care Reviewed With patient  -     Progress improving  -     Outcome Evaluation Pt is improving and is able to transfer with less assist today for ADLs and is able to transfer to [facility] and to chair with min A with rwx. Her activity tolerance and generalized weakness remain very limiting and she remains below her ADL baseline.  -       Row Name 11/13/24 1327          Therapy Plan Review/Discharge Plan (OT)    Anticipated Discharge Disposition (OT) inpatient rehabilitation facility  -       Row Name 11/13/24 1327          Vital Signs    O2 Delivery Pre Treatment supplemental O2  -     O2 Delivery Intra Treatment supplemental O2  -     O2 Delivery Post Treatment supplemental O2  -       Row Name 11/13/24 1327          Positioning and Restraints    Pre-Treatment Position in bed  -     Post Treatment Position chair  -SM     In Chair reclined;call light within reach;encouraged to call for assist;exit alarm on;notified [facility]  -               User Key  (r) = Recorded By, (t) = Taken By, (c) = Cosigned By      Initials Name Provider Type    Iman Duarte, CHE Occupational Therapist                   Outcome Measures       Row Name 11/13/24 1325          How much help from another is currently needed...    Putting on and taking off regular lower body clothing? 1  -SM     Bathing (including washing, rinsing, and drying) 2  -SM     Toileting (which includes using toilet bed pan or urinal) 2  -SM     Putting on and taking off regular upper body clothing 2  -SM     Taking care of personal grooming (such as brushing teeth) 3  -SM     Eating meals 3  -SM     AM-PAC 6 Clicks Score (OT) 13  -SM       Row Name 11/13/24 1328          Functional Assessment    Outcome Measure  Options AM-PAC 6 Clicks Daily Activity (OT)  -               User Key  (r) = Recorded By, (t) = Taken By, (c) = Cosigned By      Initials Name Provider Type     Iman Lara OT Occupational Therapist                    Occupational Therapy Education       Title: PT OT SLP Therapies (In Progress)       Topic: Occupational Therapy (In Progress)       Point: ADL training (Done)       Description:   Instruct learner(s) on proper safety adaptation and remediation techniques during self care or transfers.   Instruct in proper use of assistive devices.                  Learning Progress Summary            Patient Acceptance, E, VU by PP at 11/9/2024 1149    Comment: Pt Ed on OT role and dc planning.   Family Acceptance, E, VU by PP at 11/9/2024 1149    Comment: Pt Ed on OT role and dc planning.                      Point: Home exercise program (Not Started)       Description:   Instruct learner(s) on appropriate technique for monitoring, assisting and/or progressing therapeutic exercises/activities.                  Learner Progress:  Not documented in this visit.              Point: Precautions (Not Started)       Description:   Instruct learner(s) on prescribed precautions during self-care and functional transfers.                  Learner Progress:  Not documented in this visit.              Point: Body mechanics (Not Started)       Description:   Instruct learner(s) on proper positioning and spine alignment during self-care, functional mobility activities and/or exercises.                  Learner Progress:  Not documented in this visit.                              User Key       Initials Effective Dates Name Provider Type [facility] 06/09/23 -  Susan Beckham OT Occupational Therapist OT                  OT Recommendation and Plan     Plan of Care Review  Plan of Care Reviewed With: patient  Progress: improving  Outcome Evaluation: Pt is improving and is able to transfer with less assist today  for ADLs and is able to transfer to [facility] and to chair with min A with rwx. Her activity tolerance and generalized weakness remain very limiting and she remains below her ADL baseline.     Time Calculation:         Time Calculation- OT       Row Name 11/13/24 1329             Time Calculation- OT    OT Start Time 1107  -SM      OT Stop Time 1125  -SM      OT Time Calculation (min) 18 min  -SM      OT Received On 11/13/24  -SM      OT - Next Appointment 11/14/24  -         Timed Charges    16361 - OT Therapeutic Activity Minutes 18  -SM         Total Minutes    Timed Charges Total Minutes 18  -SM       Total Minutes 18  -SM                User Key  (r) = Recorded By, (t) = Taken By, (c) = Cosigned By      Initials Name Provider Type     Iman Lara OT Occupational Therapist                  Therapy Charges for Today       Code Description Service Date Service Provider Modifiers Qty    06999118267  OT THERAPEUTIC ACT EA 15 MIN 11/13/2024 Iman Lara OT GO 1                 Iman Lara OT  11/13/2024

## 2024-11-13 NOTE — PROGRESS NOTES
"Dr. OBDULIO Siegel    [facility] CORONARY CARE        Patient ID:  Name:  Kristyn Lugo  MRN:  [id number]  1940  84 y.o.  female            CC/Reason for visit: Shortness of breath    Interval hx: Less short of breath at rest.  No cough  Still requiring 2 L of oxygen, saturating 97%    ROS: No chest pain, no abdominal pain    Vitals:  Vitals:    11/12/24 2300 11/13/24 0411 11/13/24 0600 11/13/24 0902   BP: 134/67 118/68  136/75   BP Location: Right arm Right arm  Right arm   Patient Position: Lying Lying  Lying   Pulse: 85 81     Resp: 18 20  26   Temp: 97 °F (36.1 °C) 97 °F (36.1 °C)  98.8 °F (37.1 °C)   TempSrc:    Oral   SpO2: 95% 97%     Weight:   87.1 kg (192 lb 0.3 oz)    Height:         FiO2 (%): 99 %     Body mass index is 36.73 kg/m².    Intake/Output Summary (Last 24 hours) at 11/13/2024 1059  Last data filed at 11/13/2024 0847  Gross per 24 hour   Intake 220 ml   Output 2925 ml   Net -2705 ml       Exam:  GEN:  No distress  Alert, oriented x 3.   LUNGS: Distant diminished breath sound bilat, no use of accessory muscles  CV:  Normal S1S2, without murmur, some ankle edema  ABD:  Non tender, overweight hampers rest of the exam      Scheduled meds:  aspirin, 81 mg, Nasogastric, Daily  First Mouthwash (Magic Mouthwash), 10 mL, Swish & Spit, Q6H  folic acid, 400 mcg, Nasogastric, Daily  ipratropium-albuterol, 3 mL, Nebulization, BID  lansoprazole, 30 mg, Nasogastric, QAM AC  metOLazone, 2.5 mg, Oral, Daily  senna-docusate sodium, 2 tablet, Oral, BID  sodium chloride, 10 mL, Intravenous, Q12H      IV meds:                           Data Review:   I reviewed the patient's medications and new clinical results.    No results found for: \"COVID19\"      Lab Results   Component Value Date    CALCIUM 9.1 11/13/2024    PHOS 3.1 11/11/2024    MG 1.7 11/11/2024    MG 1.8 11/10/2024    MG 2.0 11/07/2024     Results from last 7 days   Lab Units 11/13/24  0532 11/12/24  0443 11/11/24  0406 " 11/10/24  0459   SODIUM mmol/L 141 142 147* 143   POTASSIUM mmol/L 4.6 4.6 4.7 4.4   CHLORIDE mmol/L 104 103 105 107   CO2 mmol/L 30.6* 29.0 29.4* 28.7   BUN mg/dL 32* 37* 36* 37*   CREATININE mg/dL 0.86 0.81 0.83 0.86   CALCIUM mg/dL 9.1 8.6 8.8 8.6   BILIRUBIN mg/dL  --   --  <0.2 <0.2   ALK PHOS U/L  --   --  134* 120*   ALT (SGPT) U/L  --   --  14 13   AST (SGOT) U/L  --   --  20 13   GLUCOSE mg/dL 95 147* 133* 144*   WBC 10*3/mm3 9.22 9.33 9.96 9.81   HEMOGLOBIN g/dL 8.7* 9.2* 9.7* 9.5*   PLATELETS 10*3/mm3 215 183 151 125*   INR   --   --  1.09  --    PROCALCITONIN ng/mL 0.67*  --   --   --                  Results from last 7 days   Lab Units 11/07/24  0945 11/07/24  0333 11/06/24  1138   PH, ARTERIAL pH units 7.405 7.395 7.396   PCO2, ARTERIAL mm Hg 42.9 38.9 34.1*   PO2 ART mm Hg 73.0* 80.1 59.7*   O2 SATURATION ART % 94.5 95.7 90.6*   MODALITY  Adult Vent Adult Vent Adult Vent          ASSESSMENT:   Dyspnea on exertion  Acute hypoxemia  Septic shock  Chronic kidney disease  Acute kidney injury  Chronic diastolic CHF  Type 2 diabetes        PLAN:  Ultrasound showed no significant fluid for thoracentesis on the right side.  Patient needs to start ambulating to improve pulmonary toilet, clearance of respiratory secretions.  Has been in bed for a week without moving.  Needs physical therapy.  Wean off the last bit of oxygen, 1 or 2 L she has been requiring.  She is too sedentary, immobile      Madhu Siegel MD  11/13/2024

## 2024-11-13 NOTE — PROGRESS NOTES
PROGRESS NOTE      Patient Name: Kristyn Lugo  : 1940  MRN: [id number]  Primary Care Physician: Sean Fisher MD  Date of admission: 11/3/2024    Patient Care Team:  Sean Fisher MD as PCP - General (Internal Medicine)        Subjective   Subjective:     Patient seen and examined, no distress  UOP 2.9L  Not enough fluid for thoracentesis, on 2L NC    Review of systems:  All ROS negative      Allergies:    Allergies   Allergen Reactions    Fenofibrate Unknown - High Severity     Pt is unaware    Penicillins Rash     caused a rash in the 1960s; she says she has tolerated amoxicillin or Augmentin since that time.       Objective   Exam:     Vital Signs  Temp:  [97 °F (36.1 °C)-98.8 °F (37.1 °C)] 98.8 °F (37.1 °C)  Heart Rate:  [] 82  Resp:  [18-26] 24  BP: (118-140)/(67-78) 136/75  SpO2:  [90 %-100 %] 100 %  on  Flow (L/min) (Oxygen Therapy):  [2] 2;   Device (Oxygen Therapy): room air  Body mass index is 36.73 kg/m².    General:  elderly  female intubated sedated.    Head:      Normocephalic and atraumatic.    Eyes:      PERRL/EOM intact, conjunctivae and sclerae clear without nystagmus.    Neck:      No masses, thyromegaly,  trachea central with normal respiratory effort   Lungs:    Clear bilaterally to auscultation.    Heart:      Regular rate and rhythm, no murmur no gallop  Abd:        Soft, nontender, not distended, bowel sounds positive, no shifting dullness   Pulses:   Pulses palpable  Extr:        No cyanosis or clubbing--mild edema.    Neuro:    sedated  Skin:       Intact without lesions or rashes.    Psych:    Alert and cooperative; normal mood and affect; .      Results Review:  I have personally reviewed most recent Data :  CBC    Results from last 7 days   Lab Units 24  0532 24  0443 24  0406 11/10/24  0459 24  0634 24  0441 24  0450   WBC 10*3/mm3 9.22 9.33 9.96 9.81 10.16 8.84 11.80*   HEMOGLOBIN g/dL 8.7* 9.2* 9.7* 9.5* 10.2*  9.4* 9.6*   PLATELETS 10*3/mm3 215 183 151 125* 126* 93* 74*     CMP   Results from last 7 days   Lab Units 11/13/24  0532 11/12/24  0443 11/11/24  0406 11/10/24  0459 11/08/24  0441 11/07/24  0450 11/06/24  1529   SODIUM mmol/L 141 142 147* 143 148* 142  --    POTASSIUM mmol/L 4.6 4.6 4.7 4.4 3.4* 3.1*  --    CHLORIDE mmol/L 104 103 105 107 111* 108*  --    CO2 mmol/L 30.6* 29.0 29.4* 28.7 28.0 23.3  --    BUN mg/dL 32* 37* 36* 37* 51* 50*  --    CREATININE mg/dL 0.86 0.81 0.83 0.86 1.27* 1.50*  --    GLUCOSE mg/dL 95 147* 133* 144* 143* 173*  --    ALBUMIN g/dL  --   --  2.8* 2.7*  --   --  2.2*   BILIRUBIN mg/dL  --   --  <0.2 <0.2  --   --   --    ALK PHOS U/L  --   --  134* 120*  --   --   --    AST (SGOT) U/L  --   --  20 13  --   --   --    ALT (SGPT) U/L  --   --  14 13  --   --   --      ABG    Results from last 7 days   Lab Units 11/07/24  0945 11/07/24  0333   PH, ARTERIAL pH units 7.405 7.395   PCO2, ARTERIAL mm Hg 42.9 38.9   PO2 ART mm Hg 73.0* 80.1   O2 SATURATION ART % 94.5 95.7   BASE EXCESS ART mmol/L 1.9 -0.9*     XR Abdomen KUB    Result Date: 11/12/2024  As above  This report was finalized on 11/12/2024 2:39 PM by Dr. Brent Ortega M.D on Workstation: [id number]      US Thoracentesis    Result Date: 11/11/2024  Minimal pleural fluid present. Thoracentesis not performed   This report was finalized on 11/11/2024 3:00 PM by Dr. Brent Ortega M.D on Workstation: [id number]       Results for orders placed during the hospital encounter of 11/03/24    Adult Transthoracic Echo Complete W/ Cont if Necessary Per Protocol    Interpretation Summary    Left ventricular systolic function is normal. Calculated left ventricular EF = 52.4%    Left ventricular wall thickness is consistent with mild septal asymmetric hypertrophy.    Left ventricular diastolic function is consistent with age.    Mild aortic valve stenosis is present.    Peak velocity of the flow distal to the aortic valve is 251 cm/s. Aortic  valve maximum pressure gradient is 25 mmHg. Aortic valve mean pressure gradient is 14 mmHg. Aortic valve dimensionless index is 0.6 .    Mild mitral valve stenosis is present. The mitral valve mean gradient is 5 mmHg.    Estimated right ventricular systolic pressure from tricuspid regurgitation is normal (<35 mmHg).    Scheduled Meds:aspirin, 81 mg, Nasogastric, Daily  First Mouthwash (Magic Mouthwash), 10 mL, Swish & Spit, Q6H  folic acid, 400 mcg, Nasogastric, Daily  ipratropium-albuterol, 3 mL, Nebulization, BID  lansoprazole, 30 mg, Nasogastric, QAM AC  metOLazone, 2.5 mg, Oral, Daily  polyethylene glycol, 17 g, Oral, Daily  senna-docusate sodium, 2 tablet, Oral, BID  sodium chloride, 10 mL, Intravenous, Q12H      Continuous Infusions:     PRN Meds:  acetaminophen **OR** acetaminophen    bisacodyl    calcium carbonate    nitroglycerin    ondansetron    sodium chloride    sodium chloride    sodium chloride    sodium chloride    sodium chloride    traMADol    Assessment & Plan   Assessment and Plan:         Acute pyelonephritis    Primary hypertension    HLD (hyperlipidemia)    Chronic diastolic CHF (congestive heart failure)    Irritable 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    Hydronephrosis of right kidney    Right ureteral stone    Acute respiratory failure with hypoxia    Thrombocytopenia    Bacteremia due to Klebsiella pneumoniae    Shock, septic    EMMA (acute kidney injury)    Stage 3a chronic kidney disease    Hypernatremia    Superficial thrombophlebitis of left upper extremity    Hypokalemia    ASSESSMENT:  Acute kidney injury  Chronic kidney disease, stage 3, with baseline creatinine roughly 1.0mg/dL; felt to be secondary to hypertension and advanced age  Metabolic acidosis  Hypokalemia  Right hydronephrosis with nephrolithiasis  Right distal ureteral calculi, s/p cysto with stent insertion 11/3/24  Sepsis  Acute hypoxemic respiratory failure  Chronic diastolic heart failure  Hypernatremia      PLAN :     Renal function improved and at baseline  CXR 11/8 with more opacities/infiltrates, with pleural fluid  Noted Pulmonary plans, not enough fluid for thoracentesis  Will start lasix 20mg po daily, continue metolazone 2.5mg po daily  Hypernatremia, on free water. Continue for now as sodium level is still on the high side  EMMA most likely multifactorial and secondary to hemodynamic insults, sepsis and right hydronephrosis, most likely has ATN  Making urine. Urine studies c/w prerenal picture   Proteinuria 1.5 gm f/u with SPEP  Supplement electrolytes as per protocol  Continue to follow-up with the volume status closely  Keep MAP>65  Antibiotics as per primary team. Currently on Rocephin  Will request strict I/Os, daily labs  Discussed in detail with RN    Note transcribed for Dr Valentín Hall kidney consultant  [phone]  11/13/2024  12:28 EST

## 2024-11-13 NOTE — CONSULTS
"Nutrition Services    Patient Name:  Kristyn Lugo  YOB: 1940  MRN: [id number]  Admit Date:  11/3/2024    Assessment Date:  11/13/24    Summary:   Follow up. Cortrak remains in place although nocturnal TF held. SLP following, diet upgraded to mechanical ground/thins on 11/12. Pt ate 50% of breakfast and pt is no longer complaining of stomach pain per conversation with pt's nurse. Pt sleeping at time of RD visit and her daughters were not present. RD believes cortrak removal would be appropriate if pt eats well at lunch.     Plan:  -continue current diet per SLP  -continue ONS  -recommend removal of cortrak if pt eats well at lunch 11/13    CLINICAL NUTRITION ASSESSMENT      Reason for Assessment Cortrak Placement, TF Assessment     Diagnosis/Problem   Acute pyelonephritis.  Acute hypoxemic respiratory failure, HTN, HLD, chronic diastolic heart failure.    Medical/Surgical History Past Medical History:   Diagnosis Date    Aortic valve regurgitation     Chronic kidney disease     Low back pain        Past Surgical History:   Procedure Laterality Date    CYSTOSCOPY W/ URETERAL STENT PLACEMENT Right 11/3/2024    Procedure: CYSTOSCOPY URETERAL CATHETER/STENT INSERTION;  Surgeon: Tony Ledesma MD;  Location: [facility];  Service: Urology;  Laterality: Right;        Anthropometrics        Current Height  Current Weight  BMI kg/m2 Height: 154 cm (60.63\")  Weight: 87.1 kg (192 lb 0.3 oz) (11/13/24 0600)  Body mass index is 36.73 kg/m².   Adjusted BMI (if applicable)    BMI Category Obese, Class I (30 - 34.9)   Ideal Body Weight (IBW) 105#   Usual Body Weight (UBW) 154# per pt's daughter   Weight Trend Gain, Amount/Timeframe: 14.6% wt gain since admission, net fluid gain of ~10L since admission per I/O documentation     Weight History Wt Readings from Last 30 Encounters:   11/13/24 0600 87.1 kg (192 lb 0.3 oz)   11/12/24 0531 87.4 kg (192 lb 10.9 oz)   11/11/24 0419 83.7 kg (184 lb 8.4 oz) " "  11/08/24 0549 80 kg (176 lb 5.9 oz)   11/07/24 0452 80.6 kg (177 lb 11.1 oz)   11/04/24 1840 78 kg (171 lb 15.3 oz)   11/04/24 0600 78.4 kg (172 lb 13.5 oz)   11/03/24 2000 77 kg (169 lb 12.1 oz)   11/03/24 1429 74.8 kg (164 lb 14.5 oz)   10/16/23 0945 74.8 kg (165 lb)   10/05/23 1505 74.4 kg (164 lb)        Estimated/Assessed Needs    Estimated 11/11, remains appropriate    Energy Requirements    EST Needs, Method, Wt used 3078-5417 kcal/day (14-17 kcal/kg, 83.7 kg)        Protein Requirements    EST Needs, Method, Wt used 72-95 g/day (1.5-2.0 g/kg IBW, 47.7 kg)        Fluid Requirements     Estimated Needs (mL/day) 500 mL + total output       Labs       Pertinent Labs    Results from last 7 days   Lab Units 11/13/24  0532 11/12/24  0443 11/11/24  0406 11/10/24  0459   SODIUM mmol/L 141 142 147* 143   POTASSIUM mmol/L 4.6 4.6 4.7 4.4   CHLORIDE mmol/L 104 103 105 107   CO2 mmol/L 30.6* 29.0 29.4* 28.7   BUN mg/dL 32* 37* 36* 37*   CREATININE mg/dL 0.86 0.81 0.83 0.86   CALCIUM mg/dL 9.1 8.6 8.8 8.6   BILIRUBIN mg/dL  --   --  <0.2 <0.2   ALK PHOS U/L  --   --  134* 120*   ALT (SGPT) U/L  --   --  14 13   AST (SGOT) U/L  --   --  20 13   GLUCOSE mg/dL 95 147* 133* 144*     Results from last 7 days   Lab Units 11/13/24  0532 11/12/24 0443 11/11/24 0406 11/10/24  0459 11/10/24  0020 11/08/24 0441 11/07/24  0450   MAGNESIUM mg/dL  --   --  1.7  --  1.8  --  2.0   PHOSPHORUS mg/dL  --   --  3.1   < >  --   --  2.7   HEMOGLOBIN g/dL 8.7*   < > 9.7*   < >  --    < > 9.6*   HEMATOCRIT % 27.1*   < > 29.9*   < >  --    < > 29.1*   WBC 10*3/mm3 9.22   < > 9.96   < >  --    < > 11.80*   ALBUMIN g/dL  --   --  2.8*   < >  --   --   --     < > = values in this interval not displayed.     Results from last 7 days   Lab Units 11/13/24  0532 11/12/24  0443 11/11/24  0406 11/10/24  0459 11/09/24  0634   INR   --   --  1.09  --   --    PLATELETS 10*3/mm3 215 183 151 125* 126*     No results found for: \"COVID19\"  No results found " "for: \"HGBA1C\"       Medications           Scheduled Medications aspirin, 81 mg, Nasogastric, Daily  First Mouthwash (Magic Mouthwash), 10 mL, Swish & Spit, Q6H  folic acid, 400 mcg, Nasogastric, Daily  ipratropium-albuterol, 3 mL, Nebulization, BID  lansoprazole, 30 mg, Nasogastric, QAM AC  metOLazone, 2.5 mg, Oral, Daily  senna-docusate sodium, 2 tablet, Oral, BID  sodium chloride, 10 mL, Intravenous, Q12H       Infusions       PRN Medications   acetaminophen **OR** acetaminophen    calcium carbonate    nitroglycerin    ondansetron    sodium chloride    sodium chloride    sodium chloride    sodium chloride    sodium chloride    traMADol     Physical Findings          General Findings sedate, ventilator support   Oral/Mouth Cavity WDL   Edema  2+ (mild), 3+ (moderate), 4+ (severe)  2+ generalized, R arm, R hand, legs, ankles  3+ L arm, L hand, L leg, L ankle, feet  4+ L hand   Gastrointestinal last bowel movement: 11/12, soft   Skin  skin intact   Tubes/Drains/Lines Cortrak, gastric placement    NFPE Date Completed: 11/6  NFPE completed and not consistent with nutrition diagnosis of malnutrition at this time using AND/ASPEN criteria      --  Current Nutrition Orders & Evaluation of Intake       Oral Nutrition     Food Allergies NKFA   Current PO Diet Diet: Regular/House; Feeding Assistance - Nursing; Texture: Mechanical Ground (NDD 2); Fluid Consistency: Thin (IDDSI 0)   Supplement n/a   PO Evaluation     % PO Intake 50% at breakfast, poor PO intake on puree    Factors Affecting Intake: decreased appetite, swallow impairment     PES STATEMENT / NUTRITION DIAGNOSIS      Nutrition Dx Problem  Problem: Swallowing Difficulty  Etiology: Medical Diagnosis - dysphagia    Signs/Symptoms: SLP/Swallow Evaluation and Other (comment) mechanically altered diet for safe PO intake    --  NUTRITION INTERVENTION / PLAN OF CARE      Intervention Goal(s) Increase intake and Accepts oral nutrition supplement         RD " Intervention/Action Encourage intake         Prescription/Orders:       PO Diet       Supplements Boost Glucose Control BID (Provides 380 kcals, 32 g protein if consumed)   Magic Cup q daily (290 kcals, 9 g protein if consumed)         Enteral Nutrition Nocturnal TF:  Isosource 1.5 at 65 mL/hr + 200 mL q4 water flush (per nephrology) from 8PM to 8AM.     Calories  1170 kcal (100% lower end of estimated needs)   Protein  53 g   Free water  596 mL   Flushes  600 mL during hours of infusion          Water flushes adjusted based on clinical picture + Rx flushes/IV fluids          Parenteral Nutrition    New Prescription Ordered? No changes at this time   --      Monitor/Evaluation Per protocol, PO intake, Supplement intake, Pertinent labs, EN delivery/tolerance, Weight, Swallow function   Discharge Plan/Needs Pending clinical course   --    RD to follow per protocol.      Electronically signed by:  Noemy Bennett RD  11/13/24 11:13 EST

## 2024-11-13 NOTE — PROGRESS NOTES
Clinical Pharmacy:  Stress Ulcer Prophylaxis Discontinuation    Kristyn Lugo was started on pantoprazole for stress ulcer prophylaxis while in the ICU or CCU.  Indication:  Mechanical ventilation   Sepsis      Patient no longer meets the requirements for SUP and it will be discontinued per protocol.  Physician may override and reorder medication if desired.  Per P&T committee approval.  Thank you,    Briana Suarez, PharmD, BCPS   11/13/2415:27 EST

## 2024-11-13 NOTE — PROGRESS NOTES
" LOS: 10 days     Name: Kristyn Lugo  Age: 84 y.o.  Sex: female  :  1940  MRN: [id number]         Primary Care Physician: Sean Fisher MD    Subjective   Subjective  Denies any nausea this morning.  Has no specific complaints.  States she wants to get up to the bedside commode to have a bowel movement today.    Objective   Vital Signs  Temp:  [97 °F (36.1 °C)-98.8 °F (37.1 °C)] 98.8 °F (37.1 °C)  Heart Rate:  [80-93] 81  Resp:  [18-26] 26  BP: (118-140)/(67-78) 136/75  Body mass index is 36.73 kg/m².    Objective:  General Appearance:  Comfortable and in no acute distress (Weak and deconditioned appearing).    Vital signs: (most recent): Blood pressure 136/75, pulse 81, temperature 98.8 °F (37.1 °C), temperature source Oral, resp. rate 26, height 154 cm (60.63\"), weight 87.1 kg (192 lb 0.3 oz), SpO2 97%.    HEENT: (Cortrak in place)    Lungs:  Normal effort and normal respiratory rate.  She is not in respiratory distress.  There are decreased breath sounds.    Heart: Normal rate.  Regular rhythm.    Abdomen: Abdomen is soft.  Bowel sounds are normal.   There is no abdominal tenderness.     Extremities: There is no dependent edema or local swelling.    Neurological: Patient is alert and oriented to person, place and time.    Skin:  Warm and dry.            : Booth catheter in place    Results Review:       I reviewed the patient's new clinical results.    Results from last 7 days   Lab Units 24  0532 24  0443 24  0406 11/10/24  0459 24  0634 24  0441 24  0450   WBC 10*3/mm3 9.22 9.33 9.96 9.81 10.16 8.84 11.80*   HEMOGLOBIN g/dL 8.7* 9.2* 9.7* 9.5* 10.2* 9.4* 9.6*   PLATELETS 10*3/mm3 215 183 151 125* 126* 93* 74*     Results from last 7 days   Lab Units 24  0532 24  0443 24  0406 11/10/24  0459 24  0441 24  0450   SODIUM mmol/L 141 142 147* 143 148* 142   POTASSIUM mmol/L 4.6 4.6 4.7 4.4 3.4* 3.1*   CHLORIDE mmol/L 104 103 " 105 107 111* 108*   CO2 mmol/L 30.6* 29.0 29.4* 28.7 28.0 23.3   BUN mg/dL 32* 37* 36* 37* 51* 50*   CREATININE mg/dL 0.86 0.81 0.83 0.86 1.27* 1.50*   CALCIUM mg/dL 9.1 8.6 8.8 8.6 8.1* 8.3*   GLUCOSE mg/dL 95 147* 133* 144* 143* 173*     Results from last 7 days   Lab Units 11/11/24  0406   INR  1.09             Scheduled Meds:   aspirin, 81 mg, Nasogastric, Daily  First Mouthwash (Magic Mouthwash), 10 mL, Swish & Spit, Q6H  folic acid, 400 mcg, Nasogastric, Daily  ipratropium-albuterol, 3 mL, Nebulization, BID  lansoprazole, 30 mg, Nasogastric, QAM AC  metOLazone, 2.5 mg, Oral, Daily  senna-docusate sodium, 2 tablet, Oral, BID  sodium chloride, 10 mL, Intravenous, Q12H      PRN Meds:     acetaminophen **OR** acetaminophen    calcium carbonate    nitroglycerin    ondansetron    sodium chloride    sodium chloride    sodium chloride    sodium chloride    sodium chloride    traMADol  Continuous Infusions:       Assessment & Plan   Active Hospital Problems    Diagnosis  POA    **Acute pyelonephritis [N10]  Yes    Primary hypertension [I10]  Yes    HLD (hyperlipidemia) [E78.5]  Yes    Chronic diastolic CHF (congestive heart failure) [I50.32]  Yes    Irritable 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 [K58.2]  Yes    Hydronephrosis of right kidney [N13.30]  Yes    Right ureteral stone [N20.1]  Yes    Acute respiratory failure with hypoxia [J96.01]  No    Thrombocytopenia [D69.6]  Yes    Bacteremia due to Klebsiella pneumoniae [R78.81, B96.1]  Yes    Shock, septic [A41.9, R65.21]  Yes    EMMA (acute kidney injury) [N17.9]  Yes    Stage 3a chronic kidney disease [N18.31]  Yes    Hypernatremia [E87.0]  Yes    Superficial thrombophlebitis of left upper extremity [I80.8]  No    Hypokalemia [E87.6]  No      Resolved Hospital Problems   No resolved problems to display.       Assessment & Plan    85yo woman with CKD3a, HTN, HLD, chronic diastolic CHF, IBS, and aortic aneurysm, who was admitted to CCU by Intensivist with  septic shock due to acute right sided pyelonephritis complicated by right ureteral stone/obstruction. She required mechanical ventilation and pressors. We were asked to assume care when pt came out of CCU.     Acute right pyelonephritis  Right ureteral stone/obstruction  S/p cysto with right ureteral stent placement on 11/3 by Dr. Ledesma  Hematuria not unusual given stent.  Resolved.  F/u with  in 2 weeks  Has now finished antibiotics as below  Will attempt voiding trial     Septic shock  Klebsiella bacteremia  Consulted ID  They recommend abx with Rocephin through 11/12 which would complete 10 day course     EMMA/CKD3a  Renal following  Cr normalized     Hypernatremia  Resolved. Nephrology following as above.  Encourage oral water intake     Hypokalemia  Renal replacing     Thrombocytopenia  Due to sepsis  Resolved     Chronic diastolic CHF  Type 2 NSTEMI  Mod aortic insufficiency  LAFB  Ascending aortic aneurysm  Card has followed and signed off--nothing further to add     IBS  Gentle stool softener     Acute resp failure with hypoxia  Right pleural effusion  Weaning O2 as able  Continue scheduled DuoNebs  Pulm ordered thoracentesis but not enough to tap per radiology     LUE superficial thrombophlebitis  Supportive care and ASA     Dysphagia/nausea  Upgraded to mechanical round with thin liquids yesterday.  No further nausea after stopping nocturnal tube feeds.  Will keep cortrak in place today and consider removing tomorrow if she is able to eat a bit better.     Depression  Psych following     SCDs for DVT prophylaxis.  Full code.  Discussed with patient, daughter at bedside and nurse.  Anticipate discharge to acute rehab.  Possibly in 2 days or so      Expected Discharge Date: 11/13/2024; Expected Discharge Time:  3:00 PM     José Becker MD  [facility]  11/13/24  09:30 EST     0

## 2024-11-14 LAB
ANION GAP SERPL CALCULATED.3IONS-SCNC: 9 MMOL/L (ref 5–15)
BUN SERPL-MCNC: 27 MG/DL (ref 8–23)
BUN/CREAT SERPL: 38 (ref 7–25)
CALCIUM SPEC-SCNC: 9.3 MG/DL (ref 8.6–10.5)
CHLORIDE SERPL-SCNC: 100 MMOL/L (ref 98–107)
CO2 SERPL-SCNC: 31 MMOL/L (ref 22–29)
CREAT SERPL-MCNC: 0.71 MG/DL (ref 0.57–1)
EGFRCR SERPLBLD CKD-EPI 2021: 83.4 ML/MIN/1.73
GLUCOSE SERPL-MCNC: 92 MG/DL (ref 65–99)
POTASSIUM SERPL-SCNC: 4.1 MMOL/L (ref 3.5–5.2)
SODIUM SERPL-SCNC: 140 MMOL/L (ref 136–145)

## 2024-11-14 PROCEDURE — 25010000002 ONDANSETRON PER 1 MG

## 2024-11-14 PROCEDURE — 94799 UNLISTED PULMONARY SVC/PX: CPT

## 2024-11-14 PROCEDURE — 94761 N-INVAS EAR/PLS OXIMETRY MLT: CPT

## 2024-11-14 PROCEDURE — 80048 BASIC METABOLIC PNL TOTAL CA: CPT | Performed by: INTERNAL MEDICINE

## 2024-11-14 PROCEDURE — 94664 DEMO&/EVAL PT USE INHALER: CPT

## 2024-11-14 RX ORDER — METOLAZONE 2.5 MG/1
2.5 TABLET ORAL EVERY OTHER DAY
Status: DISCONTINUED | OUTPATIENT
Start: 2024-11-14 | End: 2024-11-15 | Stop reason: HOSPADM

## 2024-11-14 RX ORDER — HYDROXYZINE HYDROCHLORIDE 25 MG/1
25 TABLET, FILM COATED ORAL NIGHTLY PRN
Status: DISCONTINUED | OUTPATIENT
Start: 2024-11-14 | End: 2024-11-15 | Stop reason: HOSPADM

## 2024-11-14 RX ORDER — METOLAZONE 5 MG/1
10 TABLET ORAL ONCE
Status: COMPLETED | OUTPATIENT
Start: 2024-11-14 | End: 2024-11-14

## 2024-11-14 RX ORDER — IPRATROPIUM BROMIDE AND ALBUTEROL SULFATE 2.5; .5 MG/3ML; MG/3ML
3 SOLUTION RESPIRATORY (INHALATION)
Status: DISCONTINUED | OUTPATIENT
Start: 2024-11-14 | End: 2024-11-15 | Stop reason: HOSPADM

## 2024-11-14 RX ADMIN — ONDANSETRON 4 MG: 2 INJECTION, SOLUTION INTRAMUSCULAR; INTRAVENOUS at 20:44

## 2024-11-14 RX ADMIN — POLYETHYLENE GLYCOL 3350 17 G: 17 POWDER, FOR SOLUTION ORAL at 08:36

## 2024-11-14 RX ADMIN — Medication 10 ML: at 20:44

## 2024-11-14 RX ADMIN — FOLIC ACID TAB 400 MCG 400 MCG: 400 TAB at 08:39

## 2024-11-14 RX ADMIN — DIPHENHYDRAMINE HYDROCHLORIDE AND LIDOCAINE HYDROCHLORIDE AND ALUMINUM HYDROXIDE AND MAGNESIUM HYDRO 10 ML: KIT at 08:39

## 2024-11-14 RX ADMIN — FUROSEMIDE 20 MG: 40 TABLET ORAL at 08:37

## 2024-11-14 RX ADMIN — SENNOSIDES AND DOCUSATE SODIUM 2 TABLET: 50; 8.6 TABLET ORAL at 08:39

## 2024-11-14 RX ADMIN — ASPIRIN 81 MG: 81 TABLET, CHEWABLE ORAL at 08:38

## 2024-11-14 RX ADMIN — IPRATROPIUM BROMIDE AND ALBUTEROL SULFATE 3 ML: 2.5; .5 SOLUTION RESPIRATORY (INHALATION) at 15:29

## 2024-11-14 RX ADMIN — IPRATROPIUM BROMIDE AND ALBUTEROL SULFATE 3 ML: 2.5; .5 SOLUTION RESPIRATORY (INHALATION) at 07:18

## 2024-11-14 RX ADMIN — DIPHENHYDRAMINE HYDROCHLORIDE AND LIDOCAINE HYDROCHLORIDE AND ALUMINUM HYDROXIDE AND MAGNESIUM HYDRO 10 ML: KIT at 20:40

## 2024-11-14 RX ADMIN — SENNOSIDES AND DOCUSATE SODIUM 2 TABLET: 50; 8.6 TABLET ORAL at 20:40

## 2024-11-14 RX ADMIN — HYDROXYZINE HYDROCHLORIDE 25 MG: 25 TABLET ORAL at 21:59

## 2024-11-14 RX ADMIN — IPRATROPIUM BROMIDE AND ALBUTEROL SULFATE 3 ML: 2.5; .5 SOLUTION RESPIRATORY (INHALATION) at 19:48

## 2024-11-14 RX ADMIN — Medication 10 ML: at 08:39

## 2024-11-14 RX ADMIN — DIPHENHYDRAMINE HYDROCHLORIDE AND LIDOCAINE HYDROCHLORIDE AND ALUMINUM HYDROXIDE AND MAGNESIUM HYDRO 10 ML: KIT at 13:43

## 2024-11-14 RX ADMIN — METOLAZONE 2.5 MG: 2.5 TABLET ORAL at 08:36

## 2024-11-14 RX ADMIN — METOLAZONE 10 MG: 5 TABLET ORAL at 09:41

## 2024-11-14 RX ADMIN — ANTACID TABLETS 2 TABLET: 500 TABLET, CHEWABLE ORAL at 20:40

## 2024-11-14 NOTE — NURSING NOTE
11/13/2024 1400    Tried to wean to ra. Sats dropped to 88 with any activity. 2 liters replaced.   Loulou PARKER

## 2024-11-14 NOTE — PROGRESS NOTES
Continued Stay Note  [facility]     Patient Name: Kristyn Lugo  MRN: [id number]  Today's Date: 11/14/2024    Admit Date: 11/3/2024    Plan:  [facility]   Discharge Plan       Row Name 11/14/24 1350       Plan    Plan  [facility]    Plan Comments CCP continues to follow pt for DCP to  [facility].  Shari with  [facility] is following pt for admission to rehab when medically ready.                   Discharge Codes    No documentation.                 Expected Discharge Date and Time       Expected Discharge Date Expected Discharge Time    Nov 15, 2024               MACY Hill

## 2024-11-14 NOTE — NURSING NOTE
Access follow-up regards depression:    Pt was found RIB watching TV. She is voicing frustration - she wants to get up out of bed and is awaiting staff to help her. She is a high falls risk per primary RN. She denied current depression and/or anxiety at this time. She denied current SI/HI/AVH. She declined any needs as far as Access is concerned. Pt to discharge to SNF.

## 2024-11-14 NOTE — PROGRESS NOTES
" LOS: 11 days     Name: Kristyn Lugo  Age: 84 y.o.  Sex: female  :  1940  MRN: [id number]         Primary Care Physician: Sean Fisher MD    Subjective   Subjective  Having some nausea and queasiness this morning.  No vomiting or abdominal pain reported.  Not reporting any shortness of breath although she is on supplemental oxygen.  Was wanting to get out of bed to the chair.    Objective   Vital Signs  Temp:  [97.3 °F (36.3 °C)-98.9 °F (37.2 °C)] 98.2 °F (36.8 °C)  Heart Rate:  [] 83  Resp:  [12-25] 25  BP: (114-148)/(54-89) 134/67  Body mass index is 36.73 kg/m².    Objective:  General Appearance:  Comfortable and in no acute distress (Weak and deconditioned appearing).    Vital signs: (most recent): Blood pressure 134/67, pulse 83, temperature 98.2 °F (36.8 °C), temperature source Oral, resp. rate 25, height 154 cm (60.63\"), weight 87.1 kg (192 lb 0.3 oz), SpO2 91%.    HEENT: (Cortrak in place)    Lungs:  Normal effort and normal respiratory rate.  She is not in respiratory distress.  There are decreased breath sounds.  No wheezes.    Heart: Normal rate.  Regular rhythm.    Abdomen: Abdomen is soft and non-distended.  There is no abdominal tenderness.     Extremities: There is no dependent edema or local swelling.    Neurological: Patient is alert.    Skin:  Warm and dry.              Results Review:       I reviewed the patient's new clinical results.    Results from last 7 days   Lab Units 24  0532 24  0443 24  0406 11/10/24  0459 24  0634 24  0441   WBC 10*3/mm3 9.22 9.33 9.96 9.81 10.16 8.84   HEMOGLOBIN g/dL 8.7* 9.2* 9.7* 9.5* 10.2* 9.4*   PLATELETS 10*3/mm3 215 183 151 125* 126* 93*     Results from last 7 days   Lab Units 24  0739 24  0532 24  0443 24  0406 11/10/24  0459 24  0441   SODIUM mmol/L 140 141 142 147* 143 148*   POTASSIUM mmol/L 4.1 4.6 4.6 4.7 4.4 3.4*   CHLORIDE mmol/L 100 104 103 105 107 111*   CO2 " mmol/L 31.0* 30.6* 29.0 29.4* 28.7 28.0   BUN mg/dL 27* 32* 37* 36* 37* 51*   CREATININE mg/dL 0.71 0.86 0.81 0.83 0.86 1.27*   CALCIUM mg/dL 9.3 9.1 8.6 8.8 8.6 8.1*   GLUCOSE mg/dL 92 95 147* 133* 144* 143*     Results from last 7 days   Lab Units 11/11/24  0406   INR  1.09             Scheduled Meds:   aspirin, 81 mg, Oral, Daily  First Mouthwash (Magic Mouthwash), 10 mL, Swish & Spit, Q6H  folic acid, 400 mcg, Oral, Daily  furosemide, 20 mg, Oral, Daily  ipratropium-albuterol, 3 mL, Nebulization, TID - RT  metOLazone, 2.5 mg, Oral, Every Other Day  polyethylene glycol, 17 g, Oral, Daily  senna-docusate sodium, 2 tablet, Oral, BID  sodium chloride, 10 mL, Intravenous, Q12H      PRN Meds:     acetaminophen **OR** acetaminophen    bisacodyl    calcium carbonate    nitroglycerin    ondansetron    sodium chloride    sodium chloride    sodium chloride    sodium chloride    sodium chloride  Continuous Infusions:       Assessment & Plan   Active Hospital Problems    Diagnosis  POA    **Acute pyelonephritis [N10]  Yes    Primary hypertension [I10]  Yes    HLD (hyperlipidemia) [E78.5]  Yes    Chronic diastolic CHF (congestive heart failure) [I50.32]  Yes    Irritable 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 [K58.2]  Yes    Hydronephrosis of right kidney [N13.30]  Yes    Right ureteral stone [N20.1]  Yes    Acute respiratory failure with hypoxia [J96.01]  No    Thrombocytopenia [D69.6]  Yes    Bacteremia due to Klebsiella pneumoniae [R78.81, B96.1]  Yes    Shock, septic [A41.9, R65.21]  Yes    EMMA (acute kidney injury) [N17.9]  Yes    Stage 3a chronic kidney disease [N18.31]  Yes    Hypernatremia [E87.0]  Yes    Superficial thrombophlebitis of left upper extremity [I80.8]  No    Hypokalemia [E87.6]  No      Resolved Hospital Problems   No resolved problems to display.       Assessment & Plan    83yo woman with CKD3a, HTN, HLD, chronic diastolic CHF, IBS, and aortic aneurysm, who was admitted to CCU by Intensivist  with septic shock due to acute right sided pyelonephritis complicated by right ureteral stone/obstruction. She required mechanical ventilation and pressors. We were asked to assume care when pt came out of CCU.     Acute right pyelonephritis  Right ureteral stone/obstruction  S/p cysto with right ureteral stent placement on 11/3 by Dr. Baltazar VARGHESE/ty with  in 2 weeks  Completed antibiotic course     Septic shock  Klebsiella bacteremia  Infectious disease evaluated  Completed Rocephin course.     EMMA/CKD3a  Renal following  Cr normalized  Diuretics initiated by nephrology.     Hypernatremia  Resolved. Nephrology following as above.  Encourage oral water intake      Thrombocytopenia  Due to sepsis  Resolved     Chronic diastolic CHF  Type 2 NSTEMI  Mod aortic insufficiency  LAFB  Ascending aortic aneurysm  Cardiology evaluated     IBS  Gentle stool softener     Acute resp failure with hypoxia  Right pleural effusion  Weaning O2 as able  Continue scheduled DuoNe  Pulm ordered thoracentesis but not enough to tap per radiology     LUE superficial thrombophlebitis  Supportive care and ASA     Dysphagia/nausea  Remove cortrak if able . nutrition following     Depression  Psych following     SCDs for DVT prophylaxis.  Disposition: [facility] IR[facility]/possibly tomorrow      Expected Discharge Date: 11/13/2024; Expected Discharge Time:  3:00 PM     Kain Quezada MD  [facility]  11/14/24  09:30 EST

## 2024-11-14 NOTE — PROGRESS NOTES
"Dr. OBDULIO Siegel    [facility] CORONARY CARE        Patient ID:  Name:  Kristyn Lugo  MRN:  [id number]  1940  84 y.o.  female            CC/Reason for visit: Shortness of breath     Interval hx: Wants to participate with physical therapy.  Wants to get up on commode.  Asking for physical therapy.  On 2 L of oxygen saturating 90%       ROS: No chest pain, no abdominal pain    Vitals:  Vitals:    11/14/24 0725 11/14/24 0824 11/14/24 0836 11/14/24 0941   BP:  124/54  134/67   BP Location:  Right arm     Patient Position:  Lying     Pulse: 76 105 98 83   Resp:  25     Temp:  98.2 °F (36.8 °C)     TempSrc:  Oral     SpO2: 100% 91%     Weight:       Height:         FiO2 (%): 99 %     Body mass index is 36.73 kg/m².    Intake/Output Summary (Last 24 hours) at 11/14/2024 1234  Last data filed at 11/14/2024 0849  Gross per 24 hour   Intake --   Output 2000 ml   Net -2000 ml       Exam:  GEN:               No distress  Alert, oriented x 3.   LUNGS:           Distant diminished breath sound bilat, no use of accessory muscles  CV:                  Normal S1S2, without murmur, edema both legs and arms  ABD:                Non tender, overweight hampers rest of the exam      Scheduled meds:  aspirin, 81 mg, Oral, Daily  First Mouthwash (Magic Mouthwash), 10 mL, Swish & Spit, Q6H  folic acid, 400 mcg, Oral, Daily  furosemide, 20 mg, Oral, Daily  ipratropium-albuterol, 3 mL, Nebulization, TID - RT  metOLazone, 2.5 mg, Oral, Every Other Day  polyethylene glycol, 17 g, Oral, Daily  senna-docusate sodium, 2 tablet, Oral, BID  sodium chloride, 10 mL, Intravenous, Q12H      IV meds:                           Data Review:   I reviewed the patient's medications and new clinical results.    No results found for: \"COVID19\"        Results from last 7 days   Lab Units 11/14/24  0739 11/13/24  0532 11/12/24  0443 11/11/24  0406 11/10/24  0459   SODIUM mmol/L 140 141 142 147* 143   POTASSIUM mmol/L 4.1 4.6 4.6 4.7 " 4.4   CHLORIDE mmol/L 100 104 103 105 107   CO2 mmol/L 31.0* 30.6* 29.0 29.4* 28.7   BUN mg/dL 27* 32* 37* 36* 37*   CREATININE mg/dL 0.71 0.86 0.81 0.83 0.86   CALCIUM mg/dL 9.3 9.1 8.6 8.8 8.6   BILIRUBIN mg/dL  --   --   --  <0.2 <0.2   ALK PHOS U/L  --   --   --  134* 120*   ALT (SGPT) U/L  --   --   --  14 13   AST (SGOT) U/L  --   --   --  20 13   GLUCOSE mg/dL 92 95 147* 133* 144*   WBC 10*3/mm3  --  9.22 9.33 9.96 9.81   HEMOGLOBIN g/dL  --  8.7* 9.2* 9.7* 9.5*   PLATELETS 10*3/mm3  --  215 183 151 125*   INR   --   --   --  1.09  --    PROCALCITONIN ng/mL  --  0.67*  --   --   --               ASSESSMENT:   Dyspnea on exertion  Acute hypoxemia  Septic shock due to urinary sepsis  Chronic kidney disease  Acute kidney injury  Chronic diastolic CHF  Type 2 diabetes      PLAN:  Respiratory status is stable.  Only needs 1 or 2 L of oxygen.  Needs to start mobilizing to improve pulmonary toilet.  We will sign off        Madhu Siegel MD  11/14/2024

## 2024-11-14 NOTE — PROGRESS NOTES
PROGRESS NOTE      Patient Name: Kristyn Lugo  : 1940  MRN: [id number]  Primary Care Physician: Sean Fisher MD  Date of admission: 11/3/2024    Patient Care Team:  Sean Fisher MD as PCP - General (Internal Medicine)        Subjective   Subjective:     Patient seen and examined, no distress  UOP 700mL but already 1.3L UOP today  Not enough fluid for thoracentesis, on 2L NC    Review of systems:  All ROS negative      Allergies:    Allergies   Allergen Reactions    Fenofibrate Unknown - High Severity     Pt is unaware    Penicillins Rash     caused a rash in the 1960s; she says she has tolerated amoxicillin or Augmentin since that time.       Objective   Exam:     Vital Signs  Temp:  [97.3 °F (36.3 °C)-98.9 °F (37.2 °C)] 98.2 °F (36.8 °C)  Heart Rate:  [] 83  Resp:  [12-25] 25  BP: (114-148)/(54-89) 134/67  SpO2:  [91 %-100 %] 91 %  on  Flow (L/min) (Oxygen Therapy):  [2] 2;   Device (Oxygen Therapy): nasal cannula;humidified  Body mass index is 36.73 kg/m².    General:  elderly  female intubated sedated.    Head:      Normocephalic and atraumatic.    Eyes:      PERRL/EOM intact, conjunctivae and sclerae clear without nystagmus.    Neck:      No masses, thyromegaly,  trachea central with normal respiratory effort   Lungs:    Clear bilaterally to auscultation.    Heart:      Regular rate and rhythm, no murmur no gallop  Abd:        Soft, nontender, not distended, bowel sounds positive, no shifting dullness   Pulses:   Pulses palpable  Extr:        No cyanosis or clubbing--mild edema.    Neuro:    sedated  Skin:       Intact without lesions or rashes.    Psych:    Alert and cooperative; normal mood and affect; .      Results Review:  I have personally reviewed most recent Data :  CBC    Results from last 7 days   Lab Units 24  0532 24  0443 24  0406 11/10/24  0459 24  0634 24  0441   WBC 10*3/mm3 9.22 9.33 9.96 9.81 10.16 8.84   HEMOGLOBIN g/dL 8.7*  9.2* 9.7* 9.5* 10.2* 9.4*   PLATELETS 10*3/mm3 215 183 151 125* 126* 93*     CMP   Results from last 7 days   Lab Units 11/14/24  0739 11/13/24  0532 11/12/24  0443 11/11/24  0406 11/10/24  0459 11/08/24  0441   SODIUM mmol/L 140 141 142 147* 143 148*   POTASSIUM mmol/L 4.1 4.6 4.6 4.7 4.4 3.4*   CHLORIDE mmol/L 100 104 103 105 107 111*   CO2 mmol/L 31.0* 30.6* 29.0 29.4* 28.7 28.0   BUN mg/dL 27* 32* 37* 36* 37* 51*   CREATININE mg/dL 0.71 0.86 0.81 0.83 0.86 1.27*   GLUCOSE mg/dL 92 95 147* 133* 144* 143*   ALBUMIN g/dL  --   --   --  2.8* 2.7*  --    BILIRUBIN mg/dL  --   --   --  <0.2 <0.2  --    ALK PHOS U/L  --   --   --  134* 120*  --    AST (SGOT) U/L  --   --   --  20 13  --    ALT (SGPT) U/L  --   --   --  14 13  --      ABG          XR Abdomen KUB    Result Date: 11/12/2024  As above  This report was finalized on 11/12/2024 2:39 PM by Dr. Brent Ortega M.D on Workstation: [id number]       Results for orders placed during the hospital encounter of 11/03/24    Adult Transthoracic Echo Complete W/ Cont if Necessary Per Protocol    Interpretation Summary    Left ventricular systolic function is normal. Calculated left ventricular EF = 52.4%    Left ventricular wall thickness is consistent with mild septal asymmetric hypertrophy.    Left ventricular diastolic function is consistent with age.    Mild aortic valve stenosis is present.    Peak velocity of the flow distal to the aortic valve is 251 cm/s. Aortic valve maximum pressure gradient is 25 mmHg. Aortic valve mean pressure gradient is 14 mmHg. Aortic valve dimensionless index is 0.6 .    Mild mitral valve stenosis is present. The mitral valve mean gradient is 5 mmHg.    Estimated right ventricular systolic pressure from tricuspid regurgitation is normal (<35 mmHg).    Scheduled Meds:aspirin, 81 mg, Oral, Daily  First Mouthwash (Magic Mouthwash), 10 mL, Swish & Spit, Q6H  folic acid, 400 mcg, Oral, Daily  furosemide, 20 mg, Oral,  Daily  ipratropium-albuterol, 3 mL, Nebulization, TID - RT  metOLazone, 2.5 mg, Oral, Every Other Day  polyethylene glycol, 17 g, Oral, Daily  senna-docusate sodium, 2 tablet, Oral, BID  sodium chloride, 10 mL, Intravenous, Q12H      Continuous Infusions:     PRN Meds:  acetaminophen **OR** acetaminophen    bisacodyl    calcium carbonate    nitroglycerin    ondansetron    sodium chloride    sodium chloride    sodium chloride    sodium chloride    sodium chloride    Assessment & Plan   Assessment and Plan:         Acute pyelonephritis    Primary hypertension    HLD (hyperlipidemia)    Chronic diastolic CHF (congestive heart failure)    Irritable 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    Hydronephrosis of right kidney    Right ureteral stone    Acute respiratory failure with hypoxia    Thrombocytopenia    Bacteremia due to Klebsiella pneumoniae    Shock, septic    EMMA (acute kidney injury)    Stage 3a chronic kidney disease    Hypernatremia    Superficial thrombophlebitis of left upper extremity    Hypokalemia    ASSESSMENT:  Acute kidney injury  Chronic kidney disease, stage 3, with baseline creatinine roughly 1.0mg/dL; felt to be secondary to hypertension and advanced age  Metabolic acidosis  Hypokalemia  Right hydronephrosis with nephrolithiasis  Right distal ureteral calculi, s/p cysto with stent insertion 11/3/24  Sepsis  Acute hypoxemic respiratory failure  Chronic diastolic heart failure  Hypernatremia     PLAN :     Renal function improved and at baseline  Will Continue lasix 20mg po daily, continue metolazone 2.5mg po daily but watch electrolytes, alkalosis closely.   Electrolytes stable at this time   EMMA resolved   Proteinuria 1.5 gm, SPEP is equivocal  Continue to follow-up with the volume status closely  Keep MAP>65  Continue I/Os  Septic shock/kelbsiella bacteremia/pyelonephritis: has completed Abx. Follow with urology.    Sylvia Galeas MD  [facility]      [facility]  [phone]  11/14/2024  11:09 EST

## 2024-11-15 VITALS
HEIGHT: 61 IN | TEMPERATURE: 97.9 F | HEART RATE: 86 BPM | DIASTOLIC BLOOD PRESSURE: 80 MMHG | WEIGHT: 192.02 LBS | OXYGEN SATURATION: 96 % | BODY MASS INDEX: 36.25 KG/M2 | SYSTOLIC BLOOD PRESSURE: 133 MMHG | RESPIRATION RATE: 20 BRPM

## 2024-11-15 LAB
ALBUMIN SERPL-MCNC: 2.9 G/DL (ref 3.5–5.2)
ANION GAP SERPL CALCULATED.3IONS-SCNC: 8.5 MMOL/L (ref 5–15)
BUN SERPL-MCNC: 21 MG/DL (ref 8–23)
BUN/CREAT SERPL: 23.1 (ref 7–25)
CALCIUM SPEC-SCNC: 9.6 MG/DL (ref 8.6–10.5)
CHLORIDE SERPL-SCNC: 98 MMOL/L (ref 98–107)
CO2 SERPL-SCNC: 32.5 MMOL/L (ref 22–29)
CREAT SERPL-MCNC: 0.91 MG/DL (ref 0.57–1)
DEPRECATED RDW RBC AUTO: 41.5 FL (ref 37–54)
EGFRCR SERPLBLD CKD-EPI 2021: 62 ML/MIN/1.73
ERYTHROCYTE [DISTWIDTH] IN BLOOD BY AUTOMATED COUNT: 13.4 % (ref 12.3–15.4)
GLUCOSE SERPL-MCNC: 93 MG/DL (ref 65–99)
HCT VFR BLD AUTO: 25.3 % (ref 34–46.6)
HGB BLD-MCNC: 8.3 G/DL (ref 12–15.9)
MAGNESIUM SERPL-MCNC: 1.7 MG/DL (ref 1.6–2.4)
MCH RBC QN AUTO: 28.1 PG (ref 26.6–33)
MCHC RBC AUTO-ENTMCNC: 32.8 G/DL (ref 31.5–35.7)
MCV RBC AUTO: 85.8 FL (ref 79–97)
PHOSPHATE SERPL-MCNC: 4.1 MG/DL (ref 2.5–4.5)
PLATELET # BLD AUTO: 347 10*3/MM3 (ref 140–450)
PMV BLD AUTO: 11.4 FL (ref 6–12)
POTASSIUM SERPL-SCNC: 3.7 MMOL/L (ref 3.5–5.2)
RBC # BLD AUTO: 2.95 10*6/MM3 (ref 3.77–5.28)
SODIUM SERPL-SCNC: 139 MMOL/L (ref 136–145)
WBC NRBC COR # BLD AUTO: 9.36 10*3/MM3 (ref 3.4–10.8)

## 2024-11-15 PROCEDURE — 94664 DEMO&/EVAL PT USE INHALER: CPT

## 2024-11-15 PROCEDURE — 94761 N-INVAS EAR/PLS OXIMETRY MLT: CPT

## 2024-11-15 PROCEDURE — 80069 RENAL FUNCTION PANEL: CPT | Performed by: INTERNAL MEDICINE

## 2024-11-15 PROCEDURE — 83735 ASSAY OF MAGNESIUM: CPT | Performed by: INTERNAL MEDICINE

## 2024-11-15 PROCEDURE — 92610 EVALUATE SWALLOWING FUNCTION: CPT

## 2024-11-15 PROCEDURE — 94799 UNLISTED PULMONARY SVC/PX: CPT

## 2024-11-15 PROCEDURE — 92526 ORAL FUNCTION THERAPY: CPT

## 2024-11-15 PROCEDURE — 85027 COMPLETE CBC AUTOMATED: CPT | Performed by: INTERNAL MEDICINE

## 2024-11-15 RX ORDER — FUROSEMIDE 20 MG/1
20 TABLET ORAL DAILY
Qty: 30 TABLET | Refills: 0 | Status: SHIPPED | OUTPATIENT
Start: 2024-11-16

## 2024-11-15 RX ORDER — METOPROLOL SUCCINATE 25 MG/1
25 TABLET, EXTENDED RELEASE ORAL DAILY
Qty: 30 TABLET | Refills: 0 | Status: SHIPPED | OUTPATIENT
Start: 2024-11-15

## 2024-11-15 RX ORDER — METOLAZONE 2.5 MG/1
2.5 TABLET ORAL EVERY OTHER DAY
Qty: 30 TABLET | Refills: 0 | Status: SHIPPED | OUTPATIENT
Start: 2024-11-16

## 2024-11-15 RX ORDER — IPRATROPIUM BROMIDE AND ALBUTEROL SULFATE 2.5; .5 MG/3ML; MG/3ML
3 SOLUTION RESPIRATORY (INHALATION) EVERY 6 HOURS PRN
Qty: 360 ML | Refills: 0 | Status: SHIPPED | OUTPATIENT
Start: 2024-11-15

## 2024-11-15 RX ADMIN — SENNOSIDES AND DOCUSATE SODIUM 2 TABLET: 50; 8.6 TABLET ORAL at 08:57

## 2024-11-15 RX ADMIN — FUROSEMIDE 20 MG: 40 TABLET ORAL at 08:57

## 2024-11-15 RX ADMIN — ASPIRIN 81 MG: 81 TABLET, CHEWABLE ORAL at 08:57

## 2024-11-15 RX ADMIN — POLYETHYLENE GLYCOL 3350 17 G: 17 POWDER, FOR SOLUTION ORAL at 08:57

## 2024-11-15 RX ADMIN — FOLIC ACID TAB 400 MCG 400 MCG: 400 TAB at 08:57

## 2024-11-15 RX ADMIN — IPRATROPIUM BROMIDE AND ALBUTEROL SULFATE 3 ML: 2.5; .5 SOLUTION RESPIRATORY (INHALATION) at 07:09

## 2024-11-15 NOTE — DISCHARGE SUMMARY
Date of Admission: 11/3/2024  Date of Discharge:  11/15/2024  Primary Care Physician: Sean Fisher MD     Discharge Diagnosis:  Active Hospital Problems    Diagnosis  POA    **Acute pyelonephritis [N10]  Yes    Primary hypertension [I10]  Yes    HLD (hyperlipidemia) [E78.5]  Yes    Chronic diastolic CHF (congestive heart failure) [I50.32]  Yes    Irritable 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 [K58.2]  Yes    Hydronephrosis of right kidney [N13.30]  Yes    Right ureteral stone [N20.1]  Yes    Acute respiratory failure with hypoxia [J96.01]  No    Thrombocytopenia [D69.6]  Yes    Bacteremia due to Klebsiella pneumoniae [R78.81, B96.1]  Yes    Shock, septic [A41.9, R65.21]  Yes    EMMA (acute kidney injury) [N17.9]  Yes    Stage 3a chronic kidney disease [N18.31]  Yes    Hypernatremia [E87.0]  Yes    Superficial thrombophlebitis of left upper extremity [I80.8]  No    Hypokalemia [E87.6]  No      Resolved Hospital Problems   No resolved problems to display.       Presenting Problem/History of Present Illness from H&P:  Acute pyelonephritis [N10]  Kidney stone on right side [N20.0]  Occult GI bleeding [R19.5]  Acute kidney injury [N17.9]  Acute respiratory failure with hypoxia [J96.01]  Septic shock [A41.9, R65.21]     Kristyn Lugo is a 84 y.o. female with no prior history of any chronic kidney disease except having a kidney nodule/cyst for which she was seen by Dr. Carrillo in the past and she sees him once a year.  But she is not aware of any problem with kidney failure  Not aware of any problem with kidney stone before, she does have problem with chronic constipation for which she has to take chronic laxative and had to deal with some occasional diarrhea as a result of that  She does have chronic diastolic heart failure, echocardiogram from [facility] cannot be retrieved on Care Everywhere  She came in because she was not feeling well, she was having diarrhea for the last  24 hours, patient is a poor historian initially denies any problem but after getting the picture from the CAT scan and after talking to her she did admit having some right sided abdominal pain  She denies any dysuria or hematuria but she did notice that she was not making as much urine  She had no fever but she had episodes when she was having chills and she attributed that to the cold temperature in the room  She had some dry heaves and occasional nausea and 1 episode of vomiting but no hematemesis, she denies any blood in the stool  The diarrhea was watery but the last bowel movement was having some formed particles  Patient was found to be hypotensive, she had so for more than 2-1/2 L of crystalloids and she was still in the 80s systolic  She was given Rocephin IV and CT of the abdomen was given, we do not see any obvious GI issues however patient does have dilated right kidney with hydro nephrosis and a stone in the right ureter probably causing the above  We were unable to get a urinalysis to check for UTI because patient has been dehydrated and hypotensive.    Hospital Course:  The patient is a 84 y.o. female who presented with septic shock and was admitted to the ICU.  She had acute right-sided pyelonephritis complicated by right ureteral stone obstruction.  She required pressors and intubation.  She underwent cystoscopy with right ureteral stent placement on 11/3.  She did have Klebsiella septicemia.  She has completed antibiotic course per infectious these recommendations.  She needs to follow-up with urology in 2 weeks in clinic.    She had EMMA on CKD 3a.  Nephrology evaluated.  Her creatinine has improved and diuretics have been resumed.  Her blood pressure is improved and now so I am resuming a low-dose of her previous beta-blocker.  The thiazide component has not been resumed since she is now on other diuretics.    She has chronic diastolic heart failure had a type II NSTEMI secondary to the sepsis.   Cardiology evaluated inpatient.  She needs follow-up with her home cardiologist after discharge.    She had acute respiratory failure with hypoxia and right pleural effusion.  Pulmonology evaluated and her dyspnea has improved.  She still is requiring some supplemental oxygen.  This can continue to be weaned and acute rehab.    She had dysphagia after extubation.  She did have a cortrak and underwent speech evaluation.  Core track removed and on modified diet.  Continue SLP.    Exam Today:  General Appearance:  Comfortable and in no acute distress (Weak and deconditioned appearing).    Lungs:  Normal effort and normal respiratory rate.  She is not in respiratory distress.  There are decreased breath sounds.  No wheezes.    Heart: Normal rate.  Regular rhythm.    Abdomen: Abdomen is soft and non-distended.  There is no abdominal tenderness.     Extremities: There is no dependent edema or local swelling.    Neurological: Patient is alert.    Skin:  Warm and dry.      Results:  CXR  No focal pulmonary consolidation. Cardiomegaly. Follow-up as  clinical indications persist.    CT Abdomen Pelvis  1. There is a 5 mm stone within the distal right ureter just proximal to  the UVJ with mild-moderate right hydroureteronephrosis. There is  moderate bilateral perinephric stranding. There are no left renal or  ureteral stones and there is no hydronephrosis on the left. The  perinephric stranding may be secondary to acute UTI pyelonephritis.  Urinary bladder is collapsed. Please correlate clinically.  Many phleboliths are noted. There are 2 right renal cysts with the  largest measuring 5 cm.     2. Noncontrasted liver, gallbladder, spleen, pancreas, and right adrenal  appear unremarkable. There is nodular hyperplasia of the left adrenal  gland.     3. There is no acute bowel abnormality. There is no evidence for colitis  or appendicitis. Noncontrasted uterus and adnexa appear unremarkable.  There are moderate abdominal aortic  atherosclerotic changes without  aneurysmal dilatation.     4. There is pulmonary vascular congestion and dependent atelectatic  changes at the lung bases.    CXR  FINDINGS: Endotracheal tube removed in the interim, feeding tube and  central venous catheter in position as before.     There is cardiac enlargement with vague opacity at the lung bases  consistent with pleural fluid. There is prominence of the pulmonary  vasculature again seen. No new area of airspace disease has developed  within the interim.     CONCLUSION: Stable imaging of the chest.    KUB  There is a weighted tip feeding tube present with tip located  in the region of the distal stomach pyloric region. There is a right  ureteral stent in place. There are no dilated loops of bowel. Lumbar  spine degenerative changes    TTE    Left ventricular systolic function is normal. Calculated left ventricular EF = 52.4%    Left ventricular wall thickness is consistent with mild septal asymmetric hypertrophy.    Left ventricular diastolic function is consistent with age.    Mild aortic valve stenosis is present.    Peak velocity of the flow distal to the aortic valve is 251 cm/s. Aortic valve maximum pressure gradient is 25 mmHg. Aortic valve mean pressure gradient is 14 mmHg. Aortic valve dimensionless index is 0.6 .    Mild mitral valve stenosis is present. The mitral valve mean gradient is 5 mmHg.    Estimated right ventricular systolic pressure from tricuspid regurgitation is normal (<35 mmHg).    LUE Duplex    Acute left upper extremity superficial thrombophlebitis noted in the cephalic (upper arm) and median cubital.    All other left sided vessels appear normal.    Procedures Performed:  Procedure(s):  CYSTOSCOPY URETERAL CATHETER/STENT INSERTION       Consults:   Consults       Date and Time Order Name Status Description    11/9/2024  7:21 AM Inpatient Infectious Diseases Consult Completed     11/8/2024 12:31 PM Inpatient Internal Medicine Consult  Completed     11/4/2024  2:33 PM Inpatient Cardiology Consult Completed     11/4/2024  5:27 AM Urology (on-call MD unless specified)      11/4/2024  5:25 AM Inpatient Nephrology Consult Completed     11/3/2024  7:59 PM Inpatient Urology Consult Completed     11/3/2024  4:31 PM Pulmonology (on-call MD unless specified)               Discharge Disposition:  Rehab Facility or Unit (DC - External)    Discharge Medications:     Discharge Medications        New Medications        Instructions Start Date   furosemide 20 MG tablet  Commonly known as: LASIX   20 mg, Oral, Daily   Start Date: November 16, 2024     ipratropium-albuterol 0.5-2.5 mg/3 ml nebulizer  Commonly known as: DUO-NEB   3 mL, Nebulization, Every 6 Hours PRN      metOLazone 2.5 MG tablet  Commonly known as: ZAROXOLYN   2.5 mg, Oral, Every Other Day   Start Date: November 16, 2024     metoprolol succinate XL 25 MG 24 hr tablet  Commonly known as: Toprol XL   25 mg, Oral, Daily             Continue These Medications        Instructions Start Date   acetaminophen 500 MG tablet  Commonly known as: TYLENOL   500 mg, Oral, Every 6 Hours PRN      aspirin 81 MG EC tablet   Take 1 tablet by mouth Daily.      cholecalciferol 25 MCG (1000 UT) tablet  Commonly known as: VITAMIN D3   1,000 Units, Daily      fish oil 1200 MG capsule capsule   Take 1 capsule by mouth Daily With Breakfast.      folic acid 400 MCG tablet  Commonly known as: FOLVITE   Take 1 tablet by mouth Daily.      multivitamin with minerals tablet tablet   1 tablet, Daily      simvastatin 40 MG tablet  Commonly known as: ZOCOR   1 tablet, Daily      STOOL SOFTENER PO   1 tablet, Daily             Stop These Medications      metoprolol-hydrochlorothiazide 100-25 MG per tablet  Commonly known as: LOPRESSOR HCT     naproxen sodium 220 MG tablet  Commonly known as: ALEVE              Discharge Diet:   Diet Instructions       Diet: Cardiac Diets; Healthy Heart (2-3 Na+); Mechanical Ground (NDD 2); Thin  (IDDSI 0)      Discharge Diet: Cardiac Diets    Cardiac Diet: Healthy Heart (2-3 Na+)    Texture: Mechanical Ground (NDD 2)    Fluid Consistency: Thin (IDDSI 0)            Activity at Discharge:   Activity Instructions       Activity as Tolerated              Follow-up Appointments:   Contact information for follow-up providers       Sean Fisher MD Follow up.    Specialty: Internal Medicine  Contact information:  [address] [address]  [address]  [facility] [id number] [phone]               Brian Laura MD Follow up.    Specialty: Nephrology  Contact information:  [address] [facility] [address]  [phone]                       Contact information for after-discharge care       [place]       [facility] [place]: [place]  Contact information:  [address] [id number] [phone]                                   Test Results Pending at Discharge:       Kain Quezada MD  11/15/24  12:33 EST    Time Spent on Discharge Activities: >30 minutes    Dictated portions using Dragon dictation software.

## 2024-11-15 NOTE — PROGRESS NOTES
"Dr. OBDULIO Siegel    [facility]        Patient ID:  Name:  Kristyn Lugo  MRN:  [id number]  1940  84 y.o.  female            CC/Reason for visit: Shortness of breath    Interval hx: Has no new complaints.  Says she feels very weak overall and wants to participate with physical therapy  On 1 L of oxygen saturating 92%    ROS: No chest pain, abdominal pain    Vitals:  Vitals:    11/15/24 0709 11/15/24 0715 11/15/24 0814 11/15/24 1141   BP:   139/75 133/80   BP Location:    Right arm   Patient Position:    Sitting   Pulse: 96 86     Resp: 20      Temp:   98.1 °F (36.7 °C) 97.9 °F (36.6 °C)   TempSrc:   Oral Oral   SpO2: 92% 96%     Weight:       Height:         FiO2 (%): 99 %     Body mass index is 36.73 kg/m².    Intake/Output Summary (Last 24 hours) at 11/15/2024 1355  Last data filed at 11/15/2024 0442  Gross per 24 hour   Intake 480 ml   Output 1100 ml   Net -620 ml       Exam:  GEN:  No distress  Alert, oriented x 3.  Moves all 4 extremities on command.  Very hard of hearing  LUNGS: Distant diminished but clear breath sounds bilat, no use of accessory muscles  CV:  Normal S1S2, without murmur, there is edema in both legs  ABD:  Non tender, no enlarged liver or masses      Scheduled meds:  aspirin, 81 mg, Oral, Daily  First Mouthwash (Magic Mouthwash), 10 mL, Swish & Spit, Q6H  folic acid, 400 mcg, Oral, Daily  furosemide, 20 mg, Oral, Daily  ipratropium-albuterol, 3 mL, Nebulization, TID - RT  metOLazone, 2.5 mg, Oral, Every Other Day  polyethylene glycol, 17 g, Oral, Daily  senna-docusate sodium, 2 tablet, Oral, BID  sodium chloride, 10 mL, Intravenous, Q12H      IV meds:                           Data Review:   I reviewed the patient's medications and new clinical results.    No results found for: \"COVID19\"        Results from last 7 days   Lab Units 11/15/24  0645 11/14/24  0739 11/13/24  0532 11/12/24  0443 11/11/24  0406 11/10/24  0459   SODIUM mmol/L 139 140 141 142 " 147* 143   POTASSIUM mmol/L 3.7 4.1 4.6 4.6 4.7 4.4   CHLORIDE mmol/L 98 100 104 103 105 107   CO2 mmol/L 32.5* 31.0* 30.6* 29.0 29.4* 28.7   BUN mg/dL 21 27* 32* 37* 36* 37*   CREATININE mg/dL 0.91 0.71 0.86 0.81 0.83 0.86   CALCIUM mg/dL 9.6 9.3 9.1 8.6 8.8 8.6   BILIRUBIN mg/dL  --   --   --   --  <0.2 <0.2   ALK PHOS U/L  --   --   --   --  134* 120*   ALT (SGPT) U/L  --   --   --   --  14 13   AST (SGOT) U/L  --   --   --   --  20 13   GLUCOSE mg/dL 93 92 95 147* 133* 144*   WBC 10*3/mm3 9.36  --  9.22 9.33 9.96 9.81   HEMOGLOBIN g/dL 8.3*  --  8.7* 9.2* 9.7* 9.5*   PLATELETS 10*3/mm3 347  --  215 183 151 125*   INR   --   --   --   --  1.09  --    PROCALCITONIN ng/mL  --   --  0.67*  --   --   --                 ASSESSMENT:   Dyspnea on exertion  Acute hypoxemia  Septic shock due to urinary sepsis  Chronic kidney disease  Acute kidney injury  Chronic diastolic CHF  Type 2 diabetes      PLAN:  About to wean off of oxygen.  Only requiring 1 L of oxygen.  Increase mobility to improve pulmonary hygiene measures.  We will sign        Madhu Siegel MD  11/15/2024

## 2024-11-15 NOTE — PROGRESS NOTES
PROGRESS NOTE      Patient Name: Kristyn Lugo  : 1940  MRN: [id number]  Primary Care Physician: Sean Fisher MD  Date of admission: 11/3/2024    Patient Care Team:  Sean Fisher MD as PCP - General (Internal Medicine)        Subjective   Subjective:     Patient seen and examined, no distress  UOP 2.4L   Not enough fluid for thoracentesis, on 2L NC    Review of systems:  All ROS negative      Allergies:    Allergies   Allergen Reactions    Fenofibrate Unknown - High Severity     Pt is unaware    Penicillins Rash     caused a rash in the 1960s; she says she has tolerated amoxicillin or Augmentin since that time.       Objective   Exam:     Vital Signs  Temp:  [97.9 °F (36.6 °C)-99 °F (37.2 °C)] 97.9 °F (36.6 °C)  Heart Rate:  [] 86  Resp:  [18-20] 20  BP: (112-139)/(61-80) 133/80  SpO2:  [90 %-100 %] 96 %  on  Flow (L/min) (Oxygen Therapy):  [2] 2;   Device (Oxygen Therapy): humidified;nasal cannula  Body mass index is 36.73 kg/m².    General:  elderly  female intubated sedated.    Head:      Normocephalic and atraumatic.    Eyes:      PERRL/EOM intact, conjunctivae and sclerae clear without nystagmus.    Neck:      No masses, thyromegaly,  trachea central with normal respiratory effort   Lungs:    Clear bilaterally to auscultation.    Heart:      Regular rate and rhythm, no murmur no gallop  Abd:        Soft, nontender, not distended, bowel sounds positive, no shifting dullness   Pulses:   Pulses palpable  Extr:        No cyanosis or clubbing--mild edema.    Neuro:    sedated  Skin:       Intact without lesions or rashes.    Psych:    Alert and cooperative; normal mood and affect; .      Results Review:  I have personally reviewed most recent Data :  CBC    Results from last 7 days   Lab Units 11/15/24  0645 24  0532 24  0443 24  0406 11/10/24  0459 24  0634   WBC 10*3/mm3 9.36 9.22 9.33 9.96 9.81 10.16   HEMOGLOBIN g/dL 8.3* 8.7* 9.2* 9.7* 9.5* 10.2*    PLATELETS 10*3/mm3 347 215 183 151 125* 126*     CMP   Results from last 7 days   Lab Units 11/15/24  0645 11/14/24  0739 11/13/24  0532 11/12/24  0443 11/11/24  0406 11/10/24  0459   SODIUM mmol/L 139 140 141 142 147* 143   POTASSIUM mmol/L 3.7 4.1 4.6 4.6 4.7 4.4   CHLORIDE mmol/L 98 100 104 103 105 107   CO2 mmol/L 32.5* 31.0* 30.6* 29.0 29.4* 28.7   BUN mg/dL 21 27* 32* 37* 36* 37*   CREATININE mg/dL 0.91 0.71 0.86 0.81 0.83 0.86   GLUCOSE mg/dL 93 92 95 147* 133* 144*   ALBUMIN g/dL 2.9*  --   --   --  2.8* 2.7*   BILIRUBIN mg/dL  --   --   --   --  <0.2 <0.2   ALK PHOS U/L  --   --   --   --  134* 120*   AST (SGOT) U/L  --   --   --   --  20 13   ALT (SGPT) U/L  --   --   --   --  14 13     ABG          No radiology results for the last day    Results for orders placed during the hospital encounter of 11/03/24    Adult Transthoracic Echo Complete W/ Cont if Necessary Per Protocol    Interpretation Summary    Left ventricular systolic function is normal. Calculated left ventricular EF = 52.4%    Left ventricular wall thickness is consistent with mild septal asymmetric hypertrophy.    Left ventricular diastolic function is consistent with age.    Mild aortic valve stenosis is present.    Peak velocity of the flow distal to the aortic valve is 251 cm/s. Aortic valve maximum pressure gradient is 25 mmHg. Aortic valve mean pressure gradient is 14 mmHg. Aortic valve dimensionless index is 0.6 .    Mild mitral valve stenosis is present. The mitral valve mean gradient is 5 mmHg.    Estimated right ventricular systolic pressure from tricuspid regurgitation is normal (<35 mmHg).    Scheduled Meds:aspirin, 81 mg, Oral, Daily  First Mouthwash (Magic Mouthwash), 10 mL, Swish & Spit, Q6H  folic acid, 400 mcg, Oral, Daily  furosemide, 20 mg, Oral, Daily  ipratropium-albuterol, 3 mL, Nebulization, TID - RT  metOLazone, 2.5 mg, Oral, Every Other Day  polyethylene glycol, 17 g, Oral, Daily  senna-docusate sodium, 2 tablet,  Oral, BID  sodium chloride, 10 mL, Intravenous, Q12H      Continuous Infusions:     PRN Meds:  acetaminophen **OR** acetaminophen    bisacodyl    calcium carbonate    hydrOXYzine    nitroglycerin    ondansetron    sodium chloride    sodium chloride    sodium chloride    sodium chloride    sodium chloride    Assessment & Plan   Assessment and Plan:         Acute pyelonephritis    Primary hypertension    HLD (hyperlipidemia)    Chronic diastolic CHF (congestive heart failure)    Irritable 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    Hydronephrosis of right kidney    Right ureteral stone    Acute respiratory failure with hypoxia    Thrombocytopenia    Bacteremia due to Klebsiella pneumoniae    Shock, septic    EMMA (acute kidney injury)    Stage 3a chronic kidney disease    Hypernatremia    Superficial thrombophlebitis of left upper extremity    Hypokalemia    ASSESSMENT:  Acute kidney injury  Chronic kidney disease, stage 3, with baseline creatinine roughly 1.0mg/dL; felt to be secondary to hypertension and advanced age  Metabolic acidosis  Hypokalemia  Right hydronephrosis with nephrolithiasis  Right distal ureteral calculi, s/p cysto with stent insertion 11/3/24  Sepsis  Acute hypoxemic respiratory failure  Chronic diastolic heart failure  Hypernatremia     PLAN :     Renal function improved and at baseline  Will Continue lasix 20mg po daily, continue metolazone 2.5mg po daily but watch electrolytes, alkalosis closely.   Electrolytes stable at this time   EMMA resolved   Proteinuria 1.5 gm, SPEP is equivocal  Continue to follow-up with the volume status closely  Keep MAP>65  Continue I/Os  Septic shock/kelbsiella bacteremia/pyelonephritis: has completed Abx. Follow with urology.    Note transcribed for JENNIFER Heard  [facility]    Brian Laura MD.  [facility]  [phone]  11/15/2024  12:25 EST

## 2024-11-15 NOTE — PROGRESS NOTES
Continued Stay Note  [facility]     Patient Name: Kristyn Lugo  MRN: [id number]  Today's Date: 11/15/2024    Admit Date: 11/3/2024    Plan:  [facility]   Discharge Plan       Row Name 11/15/24 0940       Plan    Plan  [facility]    Plan Comments Pt will DC to [facility] today. Comfirmed with Tosin  [facility] can accpet pt today.  Pt has new O2 requirement at 2L and will need transportation arranged at DC.  Attempted to scheduled with  [facility] which had no time avaialble.  Pt is scheduled with [facility] 2LO2 fro transport for today @ 4:00 Ref #[id number].  Pt's daughter MD Marcela and RN updated.                   Discharge Codes    No documentation.                 Expected Discharge Date and Time       Expected Discharge Date Expected Discharge Time    Nov 15, 2024               MACY Hill

## 2024-11-15 NOTE — THERAPY RE-EVALUATION
Acute Care - Speech Language Pathology   Swallow Re-Assessment [facility]     Patient Name: Kristyn Lugo  : 1940  MRN: [id number]  Today's Date: 11/15/2024               Admit Date: 11/3/2024    Visit Dx:     ICD-10-CM ICD-9-CM   1. Septic shock  A41.9 038.9    R65.21 785.52     995.92   2. Acute kidney injury  N17.9 584.9   3. Occult GI bleeding  R19.5 792.1   4. Acute respiratory failure with hypoxia  J96.01 518.81   5. Acute pyelonephritis  N10 590.10   6. Kidney stone on right side, distal UVJ with hydronephrosis  N20.0 592.0     Patient Active Problem List   Diagnosis    Lumbar spinal stenosis    Spondylolisthesis of lumbar region    Acute pyelonephritis    Primary hypertension    HLD (hyperlipidemia)    Chronic diastolic CHF (congestive heart failure)    Irritable 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    Hydronephrosis of right kidney    Right ureteral stone    Acute respiratory failure with hypoxia    Thrombocytopenia    Bacteremia due to Klebsiella pneumoniae    Shock, septic    EMMA (acute kidney injury)    Stage 3a chronic kidney disease    Hypernatremia    Superficial thrombophlebitis of left upper extremity    Hypokalemia     Past Medical History:   Diagnosis Date    Aortic valve regurgitation     Chronic kidney disease     Low back pain      Past Surgical History:   Procedure Laterality Date    CYSTOSCOPY W/ URETERAL STENT PLACEMENT Right 11/3/2024    Procedure: CYSTOSCOPY URETERAL CATHETER/STENT INSERTION;  Surgeon: Tony Ledesma MD;  Location: [facility];  Service: Urology;  Laterality: Right;       SLP Recommendation and Plan                                                                               Outcome Evaluation: Patient seen for re evaluation of swallow function. She denies dysphagia, but reports poor intake d/t lack of appetite. No overt s/s of aspiration with thins, puree, and strained mech soft. Throat clearing persists with mixed. SLP provided  education on function of SLP and dysphagia seen post extubation. SLP recs continued speech therapy follow up. Continue mech soft, no mixed, and thins. Meds whole in puree.      SWALLOW EVALUATION (Last 72 Hours)       SLP Adult Swallow Evaluation       Row Name 11/15/24 1100       Rehab Evaluation    Document Type re-evaluation  -SH    Subjective Information --    Patient Observations --    Patient Effort good  -SH    Symptoms Noted During/After Treatment --       General Information    Patient Profile Reviewed yes  -SH       Pain    Pretreatment Pain Rating 0/10 - no pain  -SH    Posttreatment Pain Rating 0/10 - no pain  -SH       Oral Motor Structure and Function              User Key  (r) = Recorded By, (t) = Taken By, (c) = Cosigned By      Initials Name Effective Dates    OC Colleen Dupont, HASMUKH 08/28/23 -     SH Iman Arreola SLP 01/05/24 -                     EDUCATION  The patient has been educated in the following areas:   Dysphagia (Swallowing Impairment).        SLP GOALS       Row Name 11/15/24 1100 11/12/24 1130          (LTG) Swallow    (LTG) Swallow Pt will tolerate safest and least restrictive diet  -SH Pt will tolerate safest and least restrictive diet  -OC     [place] (Swallow Long Term Goal) independently (over 90% accuracy)  -SH independently (over 90% accuracy)  -OC     Time Frame (Swallow Long Term Goal) by discharge  -SH by discharge  -OC     Progress/Outcomes (Swallow Long Term Goal) goal ongoing  -SH --     Comment (Swallow Long Term Goal) Patient seen for re evaluation of swallow function. She denies dysphagia, but reports poor intake d/t lack of appetite. No overt s/s of aspiration with thins, puree, and strained mech soft. Throat clearing persists with mixed. SLP provided education on function of SLP and dysphagia seen post extubation. SLP recs continued speech therapy follow up. Continue mech soft, no mixed, and thins. Meds whole in puree.  -SH --               User Key  (r) =  Recorded By, (t) = Taken By, (c) = Cosigned By      Initials Name Provider Type    Colleen Alvarado, HASMUKH Speech and Language Pathologist     Iman Arreola SLP Speech and Language Pathologist                         Time Calculation:    Time Calculation- SLP       Row Name 11/15/24 1102             Time Calculation- SLP    SLP Start Time 1000  -      SLP Received On 11/15/24  -                User Key  (r) = Recorded By, (t) = Taken By, (c) = Cosigned By      Initials Name Provider Type     Iman Arreola SLP Speech and Language Pathologist                    Therapy Charges for Today       Code Description Service Date Service Provider Modifiers Qty    [id number] HC ST TREATMENT SWALLOW 4 11/15/2024 Iman Arreola SLP GN 1                 HASMUKH Beltran  11/15/2024

## 2024-11-15 NOTE — PROGRESS NOTES
Nutrition Services    Patient Name:  Kristyn Lugo  YOB: 1940  MRN: [id number]  Admit Date:  11/3/2024    RD discontinued TF order. Adriennek was removed 11/14. Boost Glucose Control BID (Provides 380 kcals, 32 g protein if consumed) and Magic Cup q daily (290 kcals, 9 g protein if consumed) in place. Pt eating 50% at meals per documentation.     Electronically signed by:  Noemy Bennett RD  11/15/24 09:01 EST    Pt. Started having body aches, cough and headache yesterday. Worst pain is headache 10/10.

## 2024-11-15 NOTE — PLAN OF CARE
Problem: Adult Inpatient Plan of Care  Goal: Absence of Hospital-Acquired Illness or Injury  Intervention: Identify and Manage Fall Risk  Description: Perform standard risk assessment on admission using a validated tool or comprehensive approach appropriate to the patient; reassess fall risk frequently, with change in status or transfer to another level of care.  Communicate risk to interprofessional healthcare team; ensure fall risk visible cue.  Determine need for increased observation, equipment and environmental modification, as well as use of supportive, nonskid footwear.  Adjust safety measures to individual needs and identified risk factors.  Reinforce the importance of active participation with fall risk prevention, safety, and physical activity with the patient and family.  Perform regular intentional rounding to assess need for position change, pain assessment and personal needs, including assistance with toileting.  Recent Flowsheet Documentation  Taken 11/15/2024 0857 by Ria George RN  Safety Promotion/Fall Prevention:   room organization consistent   safety round/check completed  Intervention: Prevent Skin Injury  Description: Perform a screening for skin injury risk, such as pressure or moisture-associated skin damage on admission and at regular intervals throughout hospital stay.  Keep all areas of skin (especially folds) clean and dry.  Maintain adequate skin hydration.  Relieve and redistribute pressure and protect bony prominences and skin at risk for injury; implement measures based on patient-specific risk factors.  Match turning and repositioning schedule to clinical condition.  Encourage weight shift frequently; assist with reposition if unable to complete independently.  Float heels off bed; avoid pressure on the Achilles tendon.  Keep skin free from extended contact with medical devices.  Optimize nutrition and hydration.  Encourage functional activity and mobility, as early as  tolerated.  Use aids (e.g., slide boards, mechanical lift) during transfer.  Recent Flowsheet Documentation  Taken 11/15/2024 0857 by Ria George RN  Body Position: position changed independently  Intervention: Prevent and Manage VTE (Venous Thromboembolism) Risk  Description: Assess for VTE (venous thromboembolism) risk.  Promote early mobilization; encourage both active and passive leg exercises, if unable to ambulate.  Initiate and maintain compression or other therapy, as indicated, based on identified risk in accordance with organizational protocol and provider order.  Recognize the patient's individual risk for bleeding before initiating pharmacologic thromboprophylaxis.  Recent Flowsheet Documentation  Taken 11/15/2024 0857 by Ria George RN  VTE Prevention/Management:   SCDs (sequential compression devices) off   patient refused intervention  Intervention: Prevent Infection  Description: Maintain skin and mucous membrane integrity; promote hand, oral and pulmonary hygiene.  Optimize fluid balance, nutrition, sleep and glycemic control to maximize infection resistance.  Identify potential sources of infection early to prevent or mitigate progression of infection (e.g., wound, lines, devices).  Evaluate ongoing need for invasive devices; remove promptly when no longer indicated.  Review vaccination status.  Recent Flowsheet Documentation  Taken 11/15/2024 1200 by Ria George RN  Infection Prevention:   hand hygiene promoted   personal protective equipment utilized   single patient room provided   visitors restricted/screened  Taken 11/15/2024 1000 by Ria George RN  Infection Prevention:   hand hygiene promoted   personal protective equipment utilized   single patient room provided   visitors restricted/screened  Taken 11/15/2024 0800 by Ria George RN  Infection Prevention:   hand hygiene promoted   personal protective equipment utilized   single patient room provided    visitors restricted/screened  Goal: Optimal Comfort and Wellbeing  Outcome: Adequate for Care Transition  Intervention: Provide Person-Centered Care  Description: Use a family-focused approach to care; encourage support system presence and participation.  Develop trust and rapport by proactively providing information, encouraging questions, addressing concerns and offering reassurance.  Acknowledge emotional response to hospitalization.  Recognize and utilize personal coping strategies and strengths; develop goals via shared decision-making.  Honor spiritual and cultural preferences.  Recent Flowsheet Documentation  Taken 11/15/2024 0857 by Ria George RN  Trust Relationship/Rapport:   care explained   choices provided  Goal: Readiness for Transition of Care  Outcome: Adequate for Care Transition   Goal Outcome Evaluation:

## 2024-11-15 NOTE — PLAN OF CARE
Goal Outcome Evaluation:  Plan of Care Reviewed With: patient           Outcome Evaluation: Patient seen for re evaluation of swallow function. She denies dysphagia, but reports poor intake d/t lack of appetite. No overt s/s of aspiration with thins, puree, and strained mech soft. Throat clearing persists with mixed. SLP provided education on function of SLP and dysphagia seen post extubation. SLP recs continued speech therapy follow up. Continue mech soft, no mixed, and thins. Meds whole in puree.

## 2024-11-16 NOTE — CASE MANAGEMENT/SOCIAL WORK
Case Management Discharge Note      Final Note: [facility] acute rehab via [facility]  van with 02    Provided Post Acute Provider List?: Yes  Post Acute Provider List: Nursing Home, DME Supplier, Home Health, Inpatient Rehab  Provided Post Acute Provider Quality & Resource List?: Yes  Post Acute Provider Quality and Resource List: Home Health, Inpatient Rehab, Nursing Home  Delivered To: Support Person  Support Person: daughter  Method of Delivery: In person    Selected Continued Care - Discharged on 11/15/2024 Admission date: 11/3/2024 - Discharge disposition: Rehab Facility or Unit (DC - External)      Destination Coordination complete.      Service Provider Services Address Phone Fax Patient Preferred    [facility] [address] [id number] [phone] --              Durable Medical Equipment    No services have been selected for the patient.                Dialysis/Infusion    No services have been selected for the patient.                Home Medical Care    No services have been selected for the patient.                Therapy    No services have been selected for the patient.                Community Resources    No services have been selected for the patient.                Community & DME    No services have been selected for the patient.                    Transportation Services  W/C Van: [facility]    Final Discharge Disposition Code: 62 - inpatient rehab facility

## 2024-11-27 ENCOUNTER — TRANSCRIBE ORDERS (OUTPATIENT)
Dept: HOME HEALTH SERVICES | Facility: HOME HEALTHCARE | Age: 84
End: 2024-11-27
Payer: MEDICARE

## 2024-11-27 DIAGNOSIS — N12 PYELONEPHRITIS: Primary | ICD-10-CM

## 2024-11-29 ENCOUNTER — HOME HEALTH ADMISSION (OUTPATIENT)
Dept: HOME HEALTH SERVICES | Facility: HOME HEALTHCARE | Age: 84
End: 2024-11-29
Payer: MEDICARE

## 2024-12-02 ENCOUNTER — HOME CARE VISIT (OUTPATIENT)
Dept: HOME HEALTH SERVICES | Facility: HOME HEALTHCARE | Age: 84
End: 2024-12-02
Payer: MEDICARE

## 2024-12-02 VITALS
OXYGEN SATURATION: 93 % | DIASTOLIC BLOOD PRESSURE: 80 MMHG | SYSTOLIC BLOOD PRESSURE: 140 MMHG | RESPIRATION RATE: 16 BRPM | HEART RATE: 74 BPM

## 2024-12-02 VITALS
OXYGEN SATURATION: 96 % | HEART RATE: 82 BPM | TEMPERATURE: 98.2 F | SYSTOLIC BLOOD PRESSURE: 122 MMHG | DIASTOLIC BLOOD PRESSURE: 74 MMHG

## 2024-12-02 PROCEDURE — G0151 HHCP-SERV OF PT,EA 15 MIN: HCPCS

## 2024-12-02 PROCEDURE — G0152 HHCP-SERV OF OT,EA 15 MIN: HCPCS

## 2024-12-02 NOTE — HOME HEALTH
Pt is an 85yo F recently hospitalized from 11/3 to 11/15 secondary to acute pyelonephritis, kindey stone on R side, GI bleeding, ARF with hypoxia, septic shock. She then went to [facility] and returned home on 2024.   PMHX: HTN, HLD, CHF, IBS, CKD 3, hypernatremia, hypokalemia, superficial thrombosis of L UE. Hx of lumbar issues with hx of injections.  Feet have been numb for about a year.   PLOF: lives alone for the last 2.5 years ( ), ranch style home with 1 step, used rollator or regular walker in the house, cane in public, no falls in the last year, driving. Supportive family and friends.   Pt reports discomfort in her back when she walks around.  She has a postural posterior lean with ambulation with the walker. She also reports she would like to get her confidence back.    FOC: nephritis.   Skilled PT necessary to instruct pt in HEP, optimize her gait, and decrease her risk for falls.  OT eval today for ADL assessment.   Plan for PT 2wk2.   Plan for next visit: progress to standing HEP, dynamic standing balance, gait with walker.

## 2024-12-02 NOTE — CASE COMMUNICATION
Please route to attending MD.     SOC OASIS completed today. Pt is an 85yo F recently hospitalized from 11/3 to 11/15 secondary to acute pyelonephritis, kindey stone on R side, GI bleeding, ARF with hypoxia, septic shock. She then went to [facility] and returned home on 2024.   PMHX: HTN, HLD, CHF, IBS, CKD 3, hypernatremia, hypokalemia, superficial thrombosis of L UE. Hx of lumbar issues with hx of injections.  Feet have be en numb for about a year.   PLOF: lives alone for the last 2.5 years ( ), ranch style home with 1 step, used rollator or regular walker in the house, cane in public, no falls in the last year, driving. Supportive family and friends.   Pt reports discomfort in her back when she walks around.  She has a postural posterior lean with ambulation with the walker. She also reports she would like to get her confidence back.    Skille d PT necessary to instruct pt in HEP, optimize her gait, and decrease her risk for falls.  OT eval today for ADL assessment.   Plan for PT 2wk2.   Plan for next visit: progress to standing HEP, dynamic standing balance, gait with walker.

## 2024-12-02 NOTE — HOME HEALTH
"REASON FOR REFERRAL: Pt is a 83 y/o female who was hospitalized from 11/3 to 11/15 due to sepsis secondary to acute right-sided pyelonephritis complicated by right ureteral stone obstruction. She underwent cystoscopy with right ureteral stent placement on 11/3. Pt completed rehab for 2 weeks prior to DC home.  PMHx: HLD, CHF, IBS,   OT's FOCUS OF CARE:  home safety  SUBJECTIVE: \"I'm doing better now, but I hate hospitals and rehab and I dont want to ever go back.\"  SOCIAL & ENVIRONMENTAL SITUATION: Pt lives in a ranch style home alone but her children check on her intermittently since her  passed a few years ago. She uses a RW/rollator intermittently for mobility in the home due to hx of low back pain, but uses a cane when out in public. Pt has a walk in shower with grab bars, shower chair, and HH shower head. She has AE over her commodes and uses a BSC at night.   PATIENT'S &/OR CAREGIVER'S GOAL: Pt wishes to build her strength and stamina.   INTERVENTIONS: home safety edu  ASSESSMENT: No skilled OT services are indicated at this time. Pt is at PLOF with ADL routine.   PLAN: OT eval only.  PLAN FOR NEXT VISIT: N/A"

## 2024-12-04 ENCOUNTER — HOME CARE VISIT (OUTPATIENT)
Dept: HOME HEALTH SERVICES | Facility: HOME HEALTHCARE | Age: 84
End: 2024-12-04
Payer: MEDICARE

## 2024-12-04 VITALS
RESPIRATION RATE: 16 BRPM | DIASTOLIC BLOOD PRESSURE: 80 MMHG | SYSTOLIC BLOOD PRESSURE: 122 MMHG | HEART RATE: 80 BPM | OXYGEN SATURATION: 98 %

## 2024-12-04 PROCEDURE — G0151 HHCP-SERV OF PT,EA 15 MIN: HCPCS

## 2024-12-09 ENCOUNTER — HOME CARE VISIT (OUTPATIENT)
Dept: HOME HEALTH SERVICES | Facility: HOME HEALTHCARE | Age: 84
End: 2024-12-09
Payer: MEDICARE

## 2024-12-09 VITALS
OXYGEN SATURATION: 98 % | RESPIRATION RATE: 16 BRPM | HEART RATE: 88 BPM | SYSTOLIC BLOOD PRESSURE: 118 MMHG | DIASTOLIC BLOOD PRESSURE: 72 MMHG

## 2024-12-09 PROCEDURE — G0151 HHCP-SERV OF PT,EA 15 MIN: HCPCS

## 2024-12-11 ENCOUNTER — HOME CARE VISIT (OUTPATIENT)
Dept: HOME HEALTH SERVICES | Facility: HOME HEALTHCARE | Age: 84
End: 2024-12-11
Payer: MEDICARE

## 2024-12-11 VITALS
OXYGEN SATURATION: 98 % | DIASTOLIC BLOOD PRESSURE: 72 MMHG | HEART RATE: 84 BPM | SYSTOLIC BLOOD PRESSURE: 112 MMHG | RESPIRATION RATE: 16 BRPM

## 2024-12-11 PROCEDURE — G0151 HHCP-SERV OF PT,EA 15 MIN: HCPCS

## 2025-03-10 ENCOUNTER — ANESTHESIA (OUTPATIENT)
Dept: PAIN MEDICINE | Facility: HOSPITAL | Age: 85
End: 2025-03-10
Payer: MEDICARE

## 2025-03-10 ENCOUNTER — HOSPITAL ENCOUNTER (OUTPATIENT)
Dept: GENERAL RADIOLOGY | Facility: HOSPITAL | Age: 85
Discharge: HOME | End: 2025-03-10
Payer: MEDICARE

## 2025-03-10 ENCOUNTER — HOSPITAL ENCOUNTER (OUTPATIENT)
Dept: PAIN MEDICINE | Facility: HOSPITAL | Age: 85
Discharge: HOME | End: 2025-03-10
Payer: MEDICARE

## 2025-03-10 ENCOUNTER — ANESTHESIA EVENT (OUTPATIENT)
Dept: PAIN MEDICINE | Facility: HOSPITAL | Age: 85
End: 2025-03-10
Payer: MEDICARE

## 2025-03-10 VITALS
HEART RATE: 67 BPM | OXYGEN SATURATION: 98 % | RESPIRATION RATE: 16 BRPM | DIASTOLIC BLOOD PRESSURE: 91 MMHG | SYSTOLIC BLOOD PRESSURE: 165 MMHG | TEMPERATURE: 98 F

## 2025-03-10 DIAGNOSIS — R52 PAIN: ICD-10-CM

## 2025-03-10 DIAGNOSIS — M48.062 SPINAL STENOSIS OF LUMBAR REGION WITH NEUROGENIC CLAUDICATION: Primary | ICD-10-CM

## 2025-03-10 PROCEDURE — 77003 FLUOROGUIDE FOR SPINE INJECT: CPT

## 2025-03-10 PROCEDURE — 25010000002 METHYLPREDNISOLONE PER 80 MG: Performed by: ANESTHESIOLOGY

## 2025-03-10 RX ORDER — MIDAZOLAM HYDROCHLORIDE 1 MG/ML
1 INJECTION, SOLUTION INTRAMUSCULAR; INTRAVENOUS ONCE AS NEEDED
Status: DISCONTINUED | OUTPATIENT
Start: 2025-03-10 | End: 2025-03-11 | Stop reason: HOSPADM

## 2025-03-10 RX ORDER — LIDOCAINE HYDROCHLORIDE 10 MG/ML
1 INJECTION, SOLUTION INFILTRATION; PERINEURAL ONCE
Status: DISCONTINUED | OUTPATIENT
Start: 2025-03-10 | End: 2025-03-11 | Stop reason: HOSPADM

## 2025-03-10 RX ORDER — METHYLPREDNISOLONE ACETATE 80 MG/ML
80 INJECTION, SUSPENSION INTRA-ARTICULAR; INTRALESIONAL; INTRAMUSCULAR; SOFT TISSUE ONCE
Status: COMPLETED | OUTPATIENT
Start: 2025-03-10 | End: 2025-03-10

## 2025-03-10 RX ORDER — NAPROXEN SODIUM 220 MG/1
220 TABLET, FILM COATED ORAL DAILY
COMMUNITY

## 2025-03-10 RX ORDER — FENTANYL CITRATE 50 UG/ML
50 INJECTION, SOLUTION INTRAMUSCULAR; INTRAVENOUS ONCE
Status: DISCONTINUED | OUTPATIENT
Start: 2025-03-10 | End: 2025-03-11 | Stop reason: HOSPADM

## 2025-03-10 RX ORDER — IOPAMIDOL 408 MG/ML
10 INJECTION, SOLUTION INTRATHECAL
Status: DISCONTINUED | OUTPATIENT
Start: 2025-03-10 | End: 2025-03-11 | Stop reason: HOSPADM

## 2025-03-10 RX ORDER — HYDROCHLOROTHIAZIDE 25 MG/1
25 TABLET ORAL DAILY
COMMUNITY

## 2025-03-10 RX ADMIN — METHYLPREDNISOLONE ACETATE 80 MG: 80 INJECTION, SUSPENSION INTRA-ARTICULAR; INTRALESIONAL; INTRAMUSCULAR; SOFT TISSUE at 10:24

## 2025-03-10 NOTE — DISCHARGE INSTRUCTIONS
EPIDURAL STEROID INJECTION          An epidural steroid injection is a shot of steroid medicine and numbing medicine that is given into the space between the spinal cord and the bones of the back (epidural space).  The injection helps relieve pain by an irritated or swollen nerve root.    TELL YOUR HEALTH CARE PROVIDER ABOUT:  Any allergies you have  All medicines you are taking including any over the counter medicines  Any blood disorders you have  Any surgeries you have had  Any medical conditions you have  Whether you are pregnant or may be pregnant    WHAT ARE THE RISK?  Generally, this is a safe procedure. However,problems may occur, including  Headache  Bleeding  Infection  Allergic Reaction  Nerve Damage    WHAT CAN I EXPECT AFTER THE PROCEDURE?    INJECTION SITE  Remove the Band-Aid/s after 24 hours  Check your injection site every day for signs of infection.  Check for:             Redness             Bleeding (small amt is normal)             Warmth             Pus or bad odor  Some numbness may be experienced for several hours following the procedure.  Avoid using heat on the injection site for 24 hours. You may use ice intermittently if needed by placing a         towel between your skin and the ice bag and using the ice for 20 minutes 2-3 times a day.  Do not take baths, swim or use a hot tub for 24 hours.    ACTIVITY  No strenuous activity for 24 hours then return to normal activity as tolerated.  If your leg is numb, no driving until full sensation and strength has returned.    GENERAL INSTRUCTIONS:  The injection site may feel numb, use ice with caution if numbness is present and no heat for 24 hours or until numbness is gone.   If you have numbness or weakness in your arm or leg, use those areas with caution until normal sensation returns.  It is not uncommon to notice an increase in discomfort or a change in the location of discomfort for 3-4 days after the procedure.  If discomfort is noticed  at the injection site, ice may be            applied to that area for 20 min 2-3 times a day.  Take the pain medicine your physician has prescribed or over the counter pain relievers as long as you do not have any contraindications.  If you are a diabetic, monitor your blood sugar closely.  The steroids used in your procedure may increase your blood sugar level up to 36 hours after the injection.  If your blood sugar is greater than 250, call the physician that helps you monitor your blood sugar.  Keep all follow-up visits as scheduled by your health care provider. This is important.    CONTACT OUR OFFICE IF:  You have any of these signs of infection            -Redness, swelling, or warmth around your injection site.            -Fluid or blood coming from your injection site (small amt of blood is normal)            -Pus or a bad odor from your injection site            -A fever  You develop a severe headache or a stiff neck  You lose control of your bladder or bowel movements      PAIN MANAGEMENT CENTER HOURS   Monday-Friday 7:30 am. - 4:00 pm.  For any problem related to your procedure we can be reached at [phone].  If you experience an emergency with your procedure, call [phone] or go to the emergency room.    Back Exercises  These exercises help to make your trunk and back strong. They also help to keep the lower back flexible. Doing these exercises can help to prevent or lessen pain in your lower back.  If you have back pain, try to do these exercises 2-3 times each day or as told by your doctor.  As you get better, do the exercises once each day. Repeat the exercises more often as told by your doctor.  To stop back pain from coming back, do the exercises once each day, or as told by your doctor.  Do exercises exactly as told by your doctor. Stop right away if you feel sudden pain or your pain gets worse.  Exercises  Single knee to chest  Do these steps 3-5 times in a row for each leg:  Lie on your  back on a firm bed or the floor with your legs stretched out.  Bring one knee to your chest.  Grab your knee or thigh with both hands and hold it in place.  Pull on your knee until you feel a gentle stretch in your lower back or butt.  Keep doing the stretch for 10-30 seconds.  Slowly let go of your leg and straighten it.  Pelvic tilt  Do these steps 5-10 times in a row:  Lie on your back on a firm bed or the floor with your legs stretched out.  Bend your knees so they point up to the ceiling. Your feet should be flat on the floor.  Tighten your lower belly (abdomen) muscles to press your lower back against the floor. This will make your tailbone point up to the ceiling instead of pointing down to your feet or the floor.  Stay in this position for 5-10 seconds while you gently tighten your muscles and breathe evenly.  Cat-cow  Do these steps until your lower back bends more easily:  Get on your hands and knees on a firm bed or the floor. Keep your hands under your shoulders, and keep your knees under your hips. You may put padding under your knees.  Let your head hang down toward your chest. Tighten (contract) the muscles in your belly. Point your tailbone toward the floor so your lower back becomes rounded like the back of a cat.  Stay in this position for 5 seconds.  Slowly lift your head. Let the muscles of your belly relax. Point your tailbone up toward the ceiling so your back forms a sagging arch like the back of a cow.  Stay in this position for 5 seconds.     Press-ups  Do these steps 5-10 times in a row:  Lie on your belly (face-down) on a firm bed or the floor.  Place your hands near your head, about shoulder-width apart.  While you keep your back relaxed and keep your hips on the floor, slowly straighten your arms to raise the top half of your body and lift your shoulders. Do not use your back muscles. You may change where you place your hands to make yourself more comfortable.  Stay in this position for  5 seconds. Keep your back relaxed.  Slowly return to lying flat on the floor.     Bridges  Do these steps 10 times in a row:  Lie on your back on a firm bed or the floor.  Bend your knees so they point up to the ceiling. Your feet should be flat on the floor. Your arms should be flat at your sides, next to your body.  Tighten your butt muscles and lift your butt off the floor until your waist is almost as high as your knees. If you do not feel the muscles working in your butt and the back of your thighs, slide your feet 1-2 inches (2.5-5 cm) farther away from your butt.  Stay in this position for 3-5 seconds.  Slowly lower your butt to the floor, and let your butt muscles relax.  If this exercise is too easy, try doing it with your arms crossed over your chest.  Belly crunches  Do these steps 5-10 times in a row:  Lie on your back on a firm bed or the floor with your legs stretched out.  Bend your knees so they point up to the ceiling. Your feet should be flat on the floor.  Cross your arms over your chest.  Tip your chin a little bit toward your chest, but do not bend your neck.  Tighten your belly muscles and slowly raise your chest just enough to lift your shoulder blades a tiny bit off the floor. Avoid raising your body higher than that because it can put too much stress on your lower back.  Slowly lower your chest and your head to the floor.  Back lifts  Do these steps 5-10 times in a row:  Lie on your belly (face-down) with your arms at your sides, and rest your forehead on the floor.  Tighten the muscles in your legs and your butt.  Slowly lift your chest off the floor while you keep your hips on the floor. Keep the back of your head in line with the curve in your back. Look at the floor while you do this.  Stay in this position for 3-5 seconds.  Slowly lower your chest and your face to the floor.  Contact a doctor if:  Your back pain gets a lot worse when you do an exercise.  Your back pain does not get  better within 2 hours after you exercise.  If you have any of these problems, stop doing the exercises. Do not do them again unless your doctor says it is okay.  Get help right away if:  You have sudden, very bad back pain. If this happens, stop doing the exercises. Do not do them again unless your doctor says it is okay.  This information is not intended to replace advice given to you by your health care provider. Make sure you discuss any questions you have with your health care provider.  Document Revised: 03/02/2022 Document Reviewed: 03/02/2022  [employer] Patient Education © 2024 [employer]    What is [employer]?  [employer] is a fitness and wellness program offered at no additional cost to seniors 65+ on eligible Medicare plans that helps you get active, get fit, and connect with others.  The program is designed for all levels and abilities and provides access to online and in-person classes, over 15,000 fitness locations, and health & wellness discounts.  Before starting any exercise program, consult with your primary care provider.  For additional information and to check eligibility:  [email or web address]

## 2025-03-10 NOTE — H&P
HISTORY:    The patient returns for another Lumbar epidural steroid injection today.  They have received at least 50% improvement since their last injection with a pain level of 4/10 at its worst recently.    Conservative measures tried in the interim rest with no    Current Outpatient Medications on File Prior to Encounter   Medication Sig Dispense Refill    acetaminophen (TYLENOL) 500 MG tablet Take 1 tablet by mouth Every 6 (Six) Hours As Needed for Mild Pain, Fever or Headache. Indications: Pain      aspirin 81 MG EC tablet Take 1 tablet by mouth Daily.      cholecalciferol (Vitamin D, Cholecalciferol,) 25 MCG (1000 UT) tablet Take 1 tablet by mouth Daily.      Docusate Calcium (STOOL SOFTENER PO) Take 1 tablet by mouth Daily.      ferrous sulfate 325 (65 FE) MG tablet Take 325 mg by mouth Daily With Breakfast. Indications: Anemia From Inadequate Iron in the Body      folic acid (FOLVITE) 400 MCG tablet Take 1 tablet by mouth Daily.      furosemide (LASIX) 20 MG tablet Take 1 tablet by mouth Daily. 30 tablet 0    ipratropium-albuterol (DUO-NEB) 0.5-2.5 mg/3 ml nebulizer Take 3 mL by nebulization Every 6 (Six) Hours As Needed for Wheezing or Shortness of Air. 360 mL 0    Lactobacillus 0.05-0.05 MG tablet Take 1 tablet by mouth Daily. Indications: promote normal bacterial joni.      metOLazone (ZAROXOLYN) 2.5 MG tablet Take 1 tablet by mouth Every Other Day. 30 tablet 0    metoprolol succinate XL (Toprol XL) 25 MG 24 hr tablet Take 1 tablet by mouth Daily. 30 tablet 0    multivitamin with minerals (CENTRUM SILVER PO) Take 1 tablet by mouth Daily.      Omega-3 Fatty Acids (fish oil) 1200 MG capsule capsule Take 1 capsule by mouth Daily With Breakfast.      PANTOPRAZOLE SODIUM PO Take 40 mg by mouth Daily. Indications: GERD      simvastatin (ZOCOR) 40 MG tablet Take 1 tablet by mouth Daily. Indications: High Amount of Fats in the Blood       No current facility-administered medications on file prior to encounter.        Past Medical History:   Diagnosis Date    Aortic valve regurgitation     Chronic kidney disease     Low back pain        No hematologic infectious or constitutional symptoms  Negative screen for DALILA      Exam:  There were no vitals taken for this visit.  Airway Mallampatti 2  Alert and oriented      Diagnosis:  Post-Op Diagnosis Codes:     * Lumbar radiculopathy [M54.16]    Plan:  Lumbar 5 epidural steroid injection under fluoroscopic guidance    I have encouraged them to continue:  1.  Physical therapy exercises at home as prescribed by physical therapy or from the pain clinic handout.  Continuation of these exercises every day, or multiple times per week, even when the patient has good pain relief, was stressed to the patient as a preventative measure to decrease the frequency and severity of future pain episodes.  2.  Continue pain medicines as already prescribed.  If patient not currently taking any, it is recommended to begin Acetaminophen 1000 mg po q 8 hours.  If other medicines containing Acetaminophen are currently prescribed, maintain daily dose at 3000mg.    3.  If they can tolerate NSAIDS, it is recommended to take Ibuprofen 600 mg po q 6 hours for 7 days during pain exacerbations.   Alternatively, they may substitute an NSAID of their choice (e.g. Aleve)  4.  Heat and ice to the affected area as tolerated for pain control.   5.  Low impact exercise such as walking or water exercise was recommended to maintain overall health and aid in weight control.   6.  Follow up as needed for subsequent injections.

## 2025-03-10 NOTE — ANESTHESIA PROCEDURE NOTES
PAIN Epidural block      Patient reassessed immediately prior to procedure    Patient location during procedure: pain clinic  Indication:procedure for pain  Performed By  Anesthesiologist: Perez Puente MD  Preanesthetic Checklist  Completed: patient identified and risks and benefits discussed  Additional Notes  DIAGNOSIS:   Post-Op Diagnosis Codes:     * Lumbar radiculopathy [M54.16]    Sedation: none    Sedation time:    No dye due to kidney disease    A lumbar epidural steroid injection under fluoroscopic guidance was performed.  Under fluoroscopic guidance, the epidural space was identified and accessed, confirmed by loss of resistance to saline.  The above medications were injected uneventfully.    Prep:  Pt Position:prone  Sterile Tech:cap, gloves, mask and sterile barrier  Prep:chlorhexidine gluconate and isopropyl alcohol  Monitoring:blood pressure monitoring, continuous pulse oximetry and EKG  Procedure:Sedation: no     Approach:midline  Guidance: fluoroscopy  Location:lumbar  Interspace: L5-S1.  Needle Type:Tuohy  Needle Gauge:20  Aspiration:negative  Medications:  Preservative Free Saline:1mL  Comments:Depomedrol 80 mg  Post Assessment:  Pt Tolerance:patient tolerated the procedure well with no apparent complications  Complications:no

## 2025-07-01 ENCOUNTER — HOSPITAL ENCOUNTER (OUTPATIENT)
Dept: GENERAL RADIOLOGY | Facility: HOSPITAL | Age: 85
Discharge: HOME | End: 2025-07-01
Payer: MEDICARE

## 2025-07-01 ENCOUNTER — ANESTHESIA EVENT (OUTPATIENT)
Dept: PAIN MEDICINE | Facility: HOSPITAL | Age: 85
End: 2025-07-01
Payer: MEDICARE

## 2025-07-01 ENCOUNTER — HOSPITAL ENCOUNTER (OUTPATIENT)
Dept: PAIN MEDICINE | Facility: HOSPITAL | Age: 85
Discharge: HOME | End: 2025-07-01
Payer: MEDICARE

## 2025-07-01 ENCOUNTER — ANESTHESIA (OUTPATIENT)
Dept: PAIN MEDICINE | Facility: HOSPITAL | Age: 85
End: 2025-07-01
Payer: MEDICARE

## 2025-07-01 VITALS
HEART RATE: 71 BPM | DIASTOLIC BLOOD PRESSURE: 90 MMHG | TEMPERATURE: 97.3 F | SYSTOLIC BLOOD PRESSURE: 168 MMHG | OXYGEN SATURATION: 96 % | RESPIRATION RATE: 16 BRPM

## 2025-07-01 DIAGNOSIS — M48.062 SPINAL STENOSIS OF LUMBAR REGION WITH NEUROGENIC CLAUDICATION: ICD-10-CM

## 2025-07-01 DIAGNOSIS — R52 PAIN: ICD-10-CM

## 2025-07-01 PROCEDURE — 25010000002 METHYLPREDNISOLONE PER 80 MG: Performed by: ANESTHESIOLOGY

## 2025-07-01 PROCEDURE — 77003 FLUOROGUIDE FOR SPINE INJECT: CPT

## 2025-07-01 RX ORDER — MIDAZOLAM HYDROCHLORIDE 1 MG/ML
1 INJECTION, SOLUTION INTRAMUSCULAR; INTRAVENOUS ONCE AS NEEDED
Status: DISCONTINUED | OUTPATIENT
Start: 2025-07-01 | End: 2025-07-02 | Stop reason: HOSPADM

## 2025-07-01 RX ORDER — LIDOCAINE HYDROCHLORIDE 10 MG/ML
1 INJECTION, SOLUTION INFILTRATION; PERINEURAL ONCE
Status: DISCONTINUED | OUTPATIENT
Start: 2025-07-01 | End: 2025-07-02 | Stop reason: HOSPADM

## 2025-07-01 RX ORDER — FENTANYL CITRATE 50 UG/ML
50 INJECTION, SOLUTION INTRAMUSCULAR; INTRAVENOUS ONCE
Status: DISCONTINUED | OUTPATIENT
Start: 2025-07-01 | End: 2025-07-02 | Stop reason: HOSPADM

## 2025-07-01 RX ORDER — METHYLPREDNISOLONE ACETATE 80 MG/ML
80 INJECTION, SUSPENSION INTRA-ARTICULAR; INTRALESIONAL; INTRAMUSCULAR; SOFT TISSUE ONCE
Status: COMPLETED | OUTPATIENT
Start: 2025-07-01 | End: 2025-07-01

## 2025-07-01 RX ADMIN — METHYLPREDNISOLONE ACETATE 80 MG: 80 INJECTION, SUSPENSION INTRA-ARTICULAR; INTRALESIONAL; INTRAMUSCULAR; SOFT TISSUE at 09:35

## 2025-07-01 NOTE — H&P
[facility]    History and Physical    Patient Name: Kristyn Lugo  :  1940  MRN:  [id number]  Date of Admission: 2025    Subjective     Patient is a 85 y.o. female presents with chief complaint of chronic, moderate low back pain.  Onset of symptoms was gradual starting several years ago.  Symptoms are associated/aggravated by activity. Symptoms improve with injection - more than 50% relief w/ prior lesi.  Presents today for lesi.      The following portions of the patients history were reviewed and updated as appropriate: current medications, allergies, past medical history, past surgical history, past family history, past social history, and problem list                Objective     Past Medical History:   Past Medical History:   Diagnosis Date    Aortic valve regurgitation     Chronic kidney disease     Low back pain      Past Surgical History:   Past Surgical History:   Procedure Laterality Date    CYSTOSCOPY W/ URETERAL STENT PLACEMENT Right 11/3/2024    Procedure: CYSTOSCOPY URETERAL CATHETER/STENT INSERTION;  Surgeon: Tony Ledesma MD;  Location: [facility];  Service: Urology;  Laterality: Right;     Family History: History reviewed. No pertinent family history.  Social History:   Social History     Socioeconomic History    Marital status:    Tobacco Use    Smoking status: Former     Types: Cigarettes    Smokeless tobacco: Never   Vaping Use    Vaping status: Never Used   Substance and Sexual Activity    Alcohol use: Never    Drug use: Never    Sexual activity: Not Currently       Vital Signs Range for the last 24 hours  Temperature: Temp:  [36.3 °C (97.3 °F)] 36.3 °C (97.3 °F)   Temp Source: Temp src: Tympanic   BP: BP: (181)/(86) 181/86   Pulse: Heart Rate:  [71] 71   Respirations: Resp:  [18] 18   SPO2: SpO2:  [95 %] 95 %   O2 Amount (l/min):     O2 Devices Device (Oxygen Therapy): room air   Weight:            --------------------------------------------------------------------------------    Current Outpatient Medications   Medication Sig Dispense Refill    acetaminophen (TYLENOL) 500 MG tablet Take 1 tablet by mouth Every 6 (Six) Hours As Needed for Mild Pain, Fever or Headache. Indications: Pain      aspirin 81 MG EC tablet Take 1 tablet by mouth Daily.      cholecalciferol (Vitamin D, Cholecalciferol,) 25 MCG (1000 UT) tablet Take 1 tablet by mouth Daily.      Docusate Calcium (STOOL SOFTENER PO) Take 1 tablet by mouth Daily.      ferrous sulfate 325 (65 FE) MG tablet Take 1 tablet by mouth Daily With Breakfast. Indications: Anemia From Inadequate Iron in the Body      folic acid (FOLVITE) 400 MCG tablet Take 1 tablet by mouth Daily.      hydroCHLOROthiazide 25 MG tablet Take 1 tablet by mouth Daily.      Lactobacillus 0.05-0.05 MG tablet Take 1 tablet by mouth Daily. Indications: promote normal bacterial joni.      metoprolol succinate XL (Toprol XL) 25 MG 24 hr tablet Take 1 tablet by mouth Daily. 30 tablet 0    multivitamin with minerals (CENTRUM SILVER PO) Take 1 tablet by mouth Daily.      naproxen sodium (ALEVE) 220 MG tablet Take 1 tablet by mouth Daily.      Omega-3 Fatty Acids (fish oil) 1200 MG capsule capsule Take 1 capsule by mouth Daily With Breakfast.      simvastatin (ZOCOR) 40 MG tablet Take 1 tablet by mouth Daily. Indications: High Amount of Fats in the Blood       Current Facility-Administered Medications   Medication Dose Route Frequency Provider Last Rate Last Admin    fentaNYL citrate (PF) (SUBLIMAZE) injection 50 mcg  50 mcg Intravenous Once Ria Augustine MD        lidocaine (XYLOCAINE) 1 % injection 1 mL  1 mL Intradermal Once Ria Augustine MD        methylPREDNISolone acetate (DEPO-medrol) injection 80 mg  80 mg Epidural Once Ria Augustine MD        midazolam (VERSED) injection 1 mg  1 mg Intravenous Once PRN Ria Augustine MD            --------------------------------------------------------------------------------  Assessment & Plan      Anesthesia Evaluation     Patient summary reviewed and Nursing notes reviewed   no history of anesthetic complications:                Airway   Mallampati: II  Dental      Pulmonary - negative pulmonary ROS   Cardiovascular     ECG reviewed    (+) hypertension, CHF , hyperlipidemia    ROS comment: Sinus rhythm  Left axis deviation  Nonspecific  intraventricular conduction delay  Left ventricular hypertrophy  Anterior  Q waves, possibly due to LVH  When compared with ECG of 03-Nov-2024 21:06:56,      Neuro/Psych- negative ROS  GI/Hepatic/Renal/Endo    (+) renal disease- CRI    Musculoskeletal     (+) back pain, chronic pain, radiculopathy  Abdominal    Substance History - negative use     OB/GYN negative ob/gyn ROS         Other                     Diagnosis and Plan    Treatment Plan  ASA 3   Patient has had previous injection/procedure with 50-75% improvement.   Procedures: Lumbar Epidural Steroid Injection(LESI), With fluoroscopy,      Anesthetic plan and risks discussed with patient.          Diagnosis     * Lumbar radiculopathy [M54.16]

## 2025-07-01 NOTE — ANESTHESIA PROCEDURE NOTES
PAIN Epidural block    Pre-sedation assessment completed: 7/1/2025 9:28 AM    Patient reassessed immediately prior to procedure    Patient location during procedure: pain clinic  Start Time: 7/1/2025 9:28 AM  Stop Time: 7/1/2025 9:38 AM  Indication:procedure for pain  Performed By  Anesthesiologist: Ria Augustine MD  Preanesthetic Checklist  Completed: patient identified, IV checked, site marked, risks and benefits discussed, surgical consent, monitors and equipment checked, pre-op evaluation and timeout performed  Additional Notes  Fluoro used.    Prep:  Pt Position:prone  Sterile Tech:cap, gloves, mask and sterile barrier  Prep:chlorhexidine gluconate and isopropyl alcohol  Monitoring:blood pressure monitoring, continuous pulse oximetry and EKG  Procedure:Sedation: no     Approach:midline  Guidance: fluoroscopy  Location:lumbar  Level:L5-S1  Needle Type:Tuohy  Needle Gauge:20  Aspiration:negative  Medications:  Preservative Free Saline:3mL  Isovue:0mL  Comments:No dye d/t CRI  Dx: lumbar radiculopathyDepomedrol:80  Post Assessment:  Dressing:occlusive dressing applied  Pt Tolerance:patient tolerated the procedure well with no apparent complications  Complications:no

## 2025-07-01 NOTE — DISCHARGE INSTRUCTIONS
EPIDURAL STEROID INJECTION          An epidural steroid injection is a shot of steroid medicine and numbing medicine that is given into the space between the spinal cord and the bones of the back (epidural space).  The injection helps relieve pain by an irritated or swollen nerve root.    TELL YOUR HEALTH CARE PROVIDER ABOUT:  Any allergies you have  All medicines you are taking including any over the counter medicines  Any blood disorders you have  Any surgeries you have had  Any medical conditions you have  Whether you are pregnant or may be pregnant    WHAT ARE THE RISK?  Generally, this is a safe procedure. However,problems may occur, including  Headache  Bleeding  Infection  Allergic Reaction  Nerve Damage    WHAT CAN I EXPECT AFTER THE PROCEDURE?    INJECTION SITE  Remove the Band-Aid/s after 24 hours  Check your injection site every day for signs of infection.  Check for:             Redness             Bleeding (small amt is normal)             Warmth             Pus or bad odor  Some numbness may be experienced for several hours following the procedure.  Avoid using heat on the injection site for 24 hours. You may use ice intermittently if needed by placing a         towel between your skin and the ice bag and using the ice for 20 minutes 2-3 times a day.  Do not take baths, swim or use a hot tub for 24 hours.    ACTIVITY  No strenuous activity for 24 hours then return to normal activity as tolerated.  If your leg is numb, no driving until full sensation and strength has returned.    GENERAL INSTRUCTIONS:  The injection site may feel numb, use ice with caution if numbness is present and no heat for 24 hours or until numbness is gone.   If you have numbness or weakness in your arm or leg, use those areas with caution until normal sensation returns.  It is not uncommon to notice an increase in discomfort or a change in the location of discomfort for 3-4 days after the procedure.  If discomfort is noticed  at the injection site, ice may be            applied to that area for 20 min 2-3 times a day.  Take the pain medicine your physician has prescribed or over the counter pain relievers as long as you do not have any contraindications.  If you are a diabetic, monitor your blood sugar closely.  The steroids used in your procedure may increase your blood sugar level up to 36 hours after the injection.  If your blood sugar is greater than 250, call the physician that helps you monitor your blood sugar.  Keep all follow-up visits as scheduled by your health care provider. This is important.    CONTACT OUR OFFICE IF:  You have any of these signs of infection            -Redness, swelling, or warmth around your injection site.            -Fluid or blood coming from your injection site (small amt of blood is normal)            -Pus or a bad odor from your injection site            -A fever  You develop a severe headache or a stiff neck  You lose control of your bladder or bowel movements      [facility] HOURS   Monday-Friday 7:30 am. - 4:00 pm.  For any problem related to your procedure we can be reached at [phone].  If you experience an emergency with your procedure, call [phone] or go to the emergency room.    Back Exercises  These exercises help to make your trunk and back strong. They also help to keep the lower back flexible. Doing these exercises can help to prevent or lessen pain in your lower back.  If you have back pain, try to do these exercises 2-3 times each day or as told by your doctor.  As you get better, do the exercises once each day. Repeat the exercises more often as told by your doctor.  To stop back pain from coming back, do the exercises once each day, or as told by your doctor.  Do exercises exactly as told by your doctor. Stop right away if you feel sudden pain or your pain gets worse.  Exercises  Single knee to chest  Do these steps 3-5 times in a row for each leg:  Lie on your  back on a firm bed or the floor with your legs stretched out.  Bring one knee to your chest.  Grab your knee or thigh with both hands and hold it in place.  Pull on your knee until you feel a gentle stretch in your lower back or butt.  Keep doing the stretch for 10-30 seconds.  Slowly let go of your leg and straighten it.  Pelvic tilt  Do these steps 5-10 times in a row:  Lie on your back on a firm bed or the floor with your legs stretched out.  Bend your knees so they point up to the ceiling. Your feet should be flat on the floor.  Tighten your lower belly (abdomen) muscles to press your lower back against the floor. This will make your tailbone point up to the ceiling instead of pointing down to your feet or the floor.  Stay in this position for 5-10 seconds while you gently tighten your muscles and breathe evenly.  Cat-cow  Do these steps until your lower back bends more easily:  Get on your hands and knees on a firm bed or the floor. Keep your hands under your shoulders, and keep your knees under your hips. You may put padding under your knees.  Let your head hang down toward your chest. Tighten (contract) the muscles in your belly. Point your tailbone toward the floor so your lower back becomes rounded like the back of a cat.  Stay in this position for 5 seconds.  Slowly lift your head. Let the muscles of your belly relax. Point your tailbone up toward the ceiling so your back forms a sagging arch like the back of a cow.  Stay in this position for 5 seconds.     Press-ups  Do these steps 5-10 times in a row:  Lie on your belly (face-down) on a firm bed or the floor.  Place your hands near your head, about shoulder-width apart.  While you keep your back relaxed and keep your hips on the floor, slowly straighten your arms to raise the top half of your body and lift your shoulders. Do not use your back muscles. You may change where you place your hands to make yourself more comfortable.  Stay in this position for  5 seconds. Keep your back relaxed.  Slowly return to lying flat on the floor.     Bridges  Do these steps 10 times in a row:  Lie on your back on a firm bed or the floor.  Bend your knees so they point up to the ceiling. Your feet should be flat on the floor. Your arms should be flat at your sides, next to your body.  Tighten your butt muscles and lift your butt off the floor until your waist is almost as high as your knees. If you do not feel the muscles working in your butt and the back of your thighs, slide your feet 1-2 inches (2.5-5 cm) farther away from your butt.  Stay in this position for 3-5 seconds.  Slowly lower your butt to the floor, and let your butt muscles relax.  If this exercise is too easy, try doing it with your arms crossed over your chest.  Belly crunches  Do these steps 5-10 times in a row:  Lie on your back on a firm bed or the floor with your legs stretched out.  Bend your knees so they point up to the ceiling. Your feet should be flat on the floor.  Cross your arms over your chest.  Tip your chin a little bit toward your chest, but do not bend your neck.  Tighten your belly muscles and slowly raise your chest just enough to lift your shoulder blades a tiny bit off the floor. Avoid raising your body higher than that because it can put too much stress on your lower back.  Slowly lower your chest and your head to the floor.  Back lifts  Do these steps 5-10 times in a row:  Lie on your belly (face-down) with your arms at your sides, and rest your forehead on the floor.  Tighten the muscles in your legs and your butt.  Slowly lift your chest off the floor while you keep your hips on the floor. Keep the back of your head in line with the curve in your back. Look at the floor while you do this.  Stay in this position for 3-5 seconds.  Slowly lower your chest and your face to the floor.  Contact a doctor if:  Your back pain gets a lot worse when you do an exercise.  Your back pain does not get  better within 2 hours after you exercise.  If you have any of these problems, stop doing the exercises. Do not do them again unless your doctor says it is okay.  Get help right away if:  You have sudden, very bad back pain. If this happens, stop doing the exercises. Do not do them again unless your doctor says it is okay.  This information is not intended to replace advice given to you by your health care provider. Make sure you discuss any questions you have with your health care provider.  Document Revised: 03/02/2022 Document Reviewed: 03/02/2022  [employer] Patient Education © 2024 [employer]    What is [employer]?  [employer] is a fitness and wellness program offered at no additional cost to seniors 65+ on eligible Medicare plans that helps you get active, get fit, and connect with others.  The program is designed for all levels and abilities and provides access to online and in-person classes, over 15,000 fitness locations, and health & wellness discounts.  Before starting any exercise program, consult with your primary care provider.  For additional information and to check eligibility:  tools.[employer]                      EPIDURAL STEROID INJECTION          An epidural steroid injection is a shot of steroid medicine and numbing medicine that is given into the space between the spinal cord and the bones of the back (epidural space).  The injection helps relieve pain by an irritated or swollen nerve root.    TELL YOUR HEALTH CARE PROVIDER ABOUT:  Any allergies you have  All medicines you are taking including any over the counter medicines  Any blood disorders you have  Any surgeries you have had  Any medical conditions you have  Whether you are pregnant or may be pregnant    WHAT ARE THE RISK?  Generally, this is a safe procedure. However,problems may occur, including  Headache  Bleeding  Infection  Allergic Reaction  Nerve Damage    WHAT CAN I EXPECT AFTER THE PROCEDURE?    INJECTION  SITE  Remove the Band-Aid/s after 24 hours  Check your injection site every day for signs of infection.  Check for:             Redness             Bleeding (small amt is normal)             Warmth             Pus or bad odor  Some numbness may be experienced for several hours following the procedure.  Avoid using heat on the injection site for 24 hours. You may use ice intermittently if needed by placing a         towel between your skin and the ice bag and using the ice for 20 minutes 2-3 times a day.  Do not take baths, swim or use a hot tub for 24 hours.    ACTIVITY  No strenuous activity for 24 hours then return to normal activity as tolerated.  If your leg is numb, no driving until full sensation and strength has returned.    GENERAL INSTRUCTIONS:  The injection site may feel numb, use ice with caution if numbness is present and no heat for 24 hours or until numbness is gone.   If you have numbness or weakness in your arm or leg, use those areas with caution until normal sensation returns.  It is not uncommon to notice an increase in discomfort or a change in the location of discomfort for 3-4 days after the procedure.  If discomfort is noticed at the injection site, ice may be            applied to that area for 20 min 2-3 times a day.  Take the pain medicine your physician has prescribed or over the counter pain relievers as long as you do not have any contraindications.  If you are a diabetic, monitor your blood sugar closely.  The steroids used in your procedure may increase your blood sugar level up to 36 hours after the injection.  If your blood sugar is greater than 250, call the physician that helps you monitor your blood sugar.  Keep all follow-up visits as scheduled by your health care provider. This is important.    CONTACT OUR OFFICE IF:  You have any of these signs of infection            -Redness, swelling, or warmth around your injection site.            -Fluid or blood coming from your  injection site (small amt of blood is normal)            -Pus or a bad odor from your injection site            -A fever  You develop a severe headache or a stiff neck  You lose control of your bladder or bowel movements      PAIN [facility]NA[facility]EMENT [facility]ENTER HOURS   Monday-Friday 7:30 am. - 4:00 pm.  For any problem related to your procedure we can be reached at [phone].  If you experience an emergency with your procedure, call [phone] or go to the emergency room.    Back Exercises  These exercises help to make your trunk and back strong. They also help to keep the lower back flexible. Doing these exercises can help to prevent or lessen pain in your lower back.  If you have back pain, try to do these exercises 2-3 times each day or as told by your doctor.  As you get better, do the exercises once each day. Repeat the exercises more often as told by your doctor.  To stop back pain from coming back, do the exercises once each day, or as told by your doctor.  Do exercises exactly as told by your doctor. Stop right away if you feel sudden pain or your pain gets worse.  Exercises  Single knee to chest  Do these steps 3-5 times in a row for each leg:  Lie on your back on a firm bed or the floor with your legs stretched out.  Bring one knee to your chest.  Grab your knee or thigh with both hands and hold it in place.  Pull on your knee until you feel a gentle stretch in your lower back or butt.  Keep doing the stretch for 10-30 seconds.  Slowly let go of your leg and straighten it.  Pelvic tilt  Do these steps 5-10 times in a row:  Lie on your back on a firm bed or the floor with your legs stretched out.  Bend your knees so they point up to the ceiling. Your feet should be flat on the floor.  Tighten your lower belly (abdomen) muscles to press your lower back against the floor. This will make your tailbone point up to the ceiling instead of pointing down to your feet or the floor.  Stay in this position for 5-10 seconds  while you gently tighten your muscles and breathe evenly.  Cat-cow  Do these steps until your lower back bends more easily:  Get on your hands and knees on a firm bed or the floor. Keep your hands under your shoulders, and keep your knees under your hips. You may put padding under your knees.  Let your head hang down toward your chest. Tighten (contract) the muscles in your belly. Point your tailbone toward the floor so your lower back becomes rounded like the back of a cat.  Stay in this position for 5 seconds.  Slowly lift your head. Let the muscles of your belly relax. Point your tailbone up toward the ceiling so your back forms a sagging arch like the back of a cow.  Stay in this position for 5 seconds.     Press-ups  Do these steps 5-10 times in a row:  Lie on your belly (face-down) on a firm bed or the floor.  Place your hands near your head, about shoulder-width apart.  While you keep your back relaxed and keep your hips on the floor, slowly straighten your arms to raise the top half of your body and lift your shoulders. Do not use your back muscles. You may change where you place your hands to make yourself more comfortable.  Stay in this position for 5 seconds. Keep your back relaxed.  Slowly return to lying flat on the floor.     Bridges  Do these steps 10 times in a row:  Lie on your back on a firm bed or the floor.  Bend your knees so they point up to the ceiling. Your feet should be flat on the floor. Your arms should be flat at your sides, next to your body.  Tighten your butt muscles and lift your butt off the floor until your waist is almost as high as your knees. If you do not feel the muscles working in your butt and the back of your thighs, slide your feet 1-2 inches (2.5-5 cm) farther away from your butt.  Stay in this position for 3-5 seconds.  Slowly lower your butt to the floor, and let your butt muscles relax.  If this exercise is too easy, try doing it with your arms crossed over your  chest.  Belly crunches  Do these steps 5-10 times in a row:  Lie on your back on a firm bed or the floor with your legs stretched out.  Bend your knees so they point up to the ceiling. Your feet should be flat on the floor.  Cross your arms over your chest.  Tip your chin a little bit toward your chest, but do not bend your neck.  Tighten your belly muscles and slowly raise your chest just enough to lift your shoulder blades a tiny bit off the floor. Avoid raising your body higher than that because it can put too much stress on your lower back.  Slowly lower your chest and your head to the floor.  Back lifts  Do these steps 5-10 times in a row:  Lie on your belly (face-down) with your arms at your sides, and rest your forehead on the floor.  Tighten the muscles in your legs and your butt.  Slowly lift your chest off the floor while you keep your hips on the floor. Keep the back of your head in line with the curve in your back. Look at the floor while you do this.  Stay in this position for 3-5 seconds.  Slowly lower your chest and your face to the floor.  Contact a doctor if:  Your back pain gets a lot worse when you do an exercise.  Your back pain does not get better within 2 hours after you exercise.  If you have any of these problems, stop doing the exercises. Do not do them again unless your doctor says it is okay.  Get help right away if:  You have sudden, very bad back pain. If this happens, stop doing the exercises. Do not do them again unless your doctor says it is okay.  This information is not intended to replace advice given to you by your health care provider. Make sure you discuss any questions you have with your health care provider.  Document Revised: 03/02/2022 Document Reviewed: 03/02/2022  [employer] Patient Education © 2024 [employer]    What is [employer]?  [employer] is a fitness and wellness program offered at no additional cost to seniors 65+ on eligible Medicare plans that helps you  get active, get fit, and connect with others.  The program is designed for all levels and abilities and provides access to online and in-person classes, over 15,000 fitness locations, and health & wellness discounts.  Before starting any exercise program, consult with your primary care provider.  For additional information and to check eligibility:  tools[employer]

## 2025-08-21 ENCOUNTER — HOSPITAL ENCOUNTER (OUTPATIENT)
Dept: SURGERY | Facility: HOSPITAL | Age: 85
Discharge: HOME | End: 2025-08-21
Payer: MEDICARE

## 2025-08-21 ENCOUNTER — TELEPHONE (OUTPATIENT)
Dept: SURGERY | Facility: CLINIC | Age: 85
End: 2025-08-21
Payer: MEDICARE

## 2025-08-21 ENCOUNTER — OFFICE VISIT (OUTPATIENT)
Dept: SURGERY | Facility: CLINIC | Age: 85
End: 2025-08-21
Payer: MEDICARE

## 2025-08-21 VITALS
HEIGHT: 61 IN | OXYGEN SATURATION: 97 % | BODY MASS INDEX: 27.38 KG/M2 | SYSTOLIC BLOOD PRESSURE: 160 MMHG | DIASTOLIC BLOOD PRESSURE: 78 MMHG | HEART RATE: 69 BPM | RESPIRATION RATE: 17 BRPM | WEIGHT: 145 LBS

## 2025-08-21 DIAGNOSIS — Z17.0 MALIGNANT NEOPLASM OF OVERLAPPING SITES OF LEFT BREAST IN FEMALE, ESTROGEN RECEPTOR POSITIVE: Primary | ICD-10-CM

## 2025-08-21 DIAGNOSIS — C50.812 MALIGNANT NEOPLASM OF OVERLAPPING SITES OF LEFT BREAST IN FEMALE, ESTROGEN RECEPTOR POSITIVE: Primary | ICD-10-CM

## 2025-08-25 ENCOUNTER — TELEPHONE (OUTPATIENT)
Dept: SURGERY | Facility: CLINIC | Age: 85
End: 2025-08-25
Payer: MEDICARE

## 2025-08-25 ENCOUNTER — PATIENT OUTREACH (OUTPATIENT)
Dept: RADIATION ONCOLOGY | Facility: HOSPITAL | Age: 85
End: 2025-08-25
Payer: MEDICARE

## 2025-08-25 DIAGNOSIS — Z17.0 MALIGNANT NEOPLASM OF OVERLAPPING SITES OF LEFT BREAST IN FEMALE, ESTROGEN RECEPTOR POSITIVE: Primary | ICD-10-CM

## 2025-08-25 DIAGNOSIS — C50.812 MALIGNANT NEOPLASM OF OVERLAPPING SITES OF LEFT BREAST IN FEMALE, ESTROGEN RECEPTOR POSITIVE: Primary | ICD-10-CM

## 2025-08-26 ENCOUNTER — PRE-ADMISSION TESTING (OUTPATIENT)
Dept: PREADMISSION TESTING | Facility: HOSPITAL | Age: 85
End: 2025-08-26
Payer: MEDICARE

## 2025-08-26 VITALS
BODY MASS INDEX: 27.75 KG/M2 | HEART RATE: 70 BPM | TEMPERATURE: 97 F | DIASTOLIC BLOOD PRESSURE: 80 MMHG | SYSTOLIC BLOOD PRESSURE: 147 MMHG | RESPIRATION RATE: 20 BRPM | WEIGHT: 147 LBS | OXYGEN SATURATION: 97 % | HEIGHT: 61 IN

## 2025-08-26 DIAGNOSIS — C50.812 MALIGNANT NEOPLASM OF OVERLAPPING SITES OF LEFT BREAST IN FEMALE, ESTROGEN RECEPTOR POSITIVE: ICD-10-CM

## 2025-08-26 DIAGNOSIS — Z17.0 MALIGNANT NEOPLASM OF OVERLAPPING SITES OF LEFT BREAST IN FEMALE, ESTROGEN RECEPTOR POSITIVE: ICD-10-CM

## 2025-08-26 LAB
ANION GAP SERPL CALCULATED.3IONS-SCNC: 13.8 MMOL/L (ref 5–15)
BASOPHILS # BLD AUTO: 0.06 10*3/MM3 (ref 0–0.2)
BASOPHILS NFR BLD AUTO: 0.8 % (ref 0–1.5)
BUN SERPL-MCNC: 22 MG/DL (ref 8–23)
BUN/CREAT SERPL: 19.8 (ref 7–25)
CALCIUM SPEC-SCNC: 9.4 MG/DL (ref 8.6–10.5)
CHLORIDE SERPL-SCNC: 104 MMOL/L (ref 98–107)
CO2 SERPL-SCNC: 23.2 MMOL/L (ref 22–29)
CREAT SERPL-MCNC: 1.11 MG/DL (ref 0.57–1)
DEPRECATED RDW RBC AUTO: 41.6 FL (ref 37–54)
EGFRCR SERPLBLD CKD-EPI 2021: 48.5 ML/MIN/1.73
EOSINOPHIL # BLD AUTO: 0.28 10*3/MM3 (ref 0–0.4)
EOSINOPHIL NFR BLD AUTO: 3.6 % (ref 0.3–6.2)
ERYTHROCYTE [DISTWIDTH] IN BLOOD BY AUTOMATED COUNT: 12.5 % (ref 12.3–15.4)
GLUCOSE SERPL-MCNC: 104 MG/DL (ref 65–99)
HCT VFR BLD AUTO: 40.1 % (ref 34–46.6)
HGB BLD-MCNC: 13 G/DL (ref 12–15.9)
IMM GRANULOCYTES # BLD AUTO: 0.02 10*3/MM3 (ref 0–0.05)
IMM GRANULOCYTES NFR BLD AUTO: 0.3 % (ref 0–0.5)
LYMPHOCYTES # BLD AUTO: 2.15 10*3/MM3 (ref 0.7–3.1)
LYMPHOCYTES NFR BLD AUTO: 27.7 % (ref 19.6–45.3)
MCH RBC QN AUTO: 29.3 PG (ref 26.6–33)
MCHC RBC AUTO-ENTMCNC: 32.4 G/DL (ref 31.5–35.7)
MCV RBC AUTO: 90.5 FL (ref 79–97)
MONOCYTES # BLD AUTO: 0.56 10*3/MM3 (ref 0.1–0.9)
MONOCYTES NFR BLD AUTO: 7.2 % (ref 5–12)
NEUTROPHILS NFR BLD AUTO: 4.69 10*3/MM3 (ref 1.7–7)
NEUTROPHILS NFR BLD AUTO: 60.4 % (ref 42.7–76)
NRBC BLD AUTO-RTO: 0 /100 WBC (ref 0–0.2)
PLATELET # BLD AUTO: 200 10*3/MM3 (ref 140–450)
PMV BLD AUTO: 12.2 FL (ref 6–12)
POTASSIUM SERPL-SCNC: 3.7 MMOL/L (ref 3.5–5.2)
RBC # BLD AUTO: 4.43 10*6/MM3 (ref 3.77–5.28)
SODIUM SERPL-SCNC: 141 MMOL/L (ref 136–145)
WBC NRBC COR # BLD AUTO: 7.76 10*3/MM3 (ref 3.4–10.8)

## 2025-08-26 PROCEDURE — 80048 BASIC METABOLIC PNL TOTAL CA: CPT

## 2025-08-26 PROCEDURE — 36415 COLL VENOUS BLD VENIPUNCTURE: CPT

## 2025-08-26 PROCEDURE — 85025 COMPLETE CBC W/AUTO DIFF WBC: CPT

## 2025-08-26 RX ORDER — NAPROXEN SODIUM 220 MG/1
220 TABLET, FILM COATED ORAL 2 TIMES DAILY PRN
COMMUNITY
End: 2025-08-26

## 2025-08-26 RX ORDER — CHLORHEXIDINE GLUCONATE ORAL RINSE 1.2 MG/ML
15 SOLUTION DENTAL 2 TIMES DAILY
COMMUNITY

## 2025-08-29 ENCOUNTER — PATIENT OUTREACH (OUTPATIENT)
Dept: OTHER | Facility: HOSPITAL | Age: 85
End: 2025-08-29
Payer: MEDICARE

## (undated) DEVICE — LOU CYSTO: Brand: MEDLINE INDUSTRIES, INC.

## (undated) DEVICE — TOTAL TRAY, 16FR 10ML SIL FOLEY, URN: Brand: MEDLINE

## (undated) DEVICE — CATH URETRL FLXITP POLLACK STD 5F 70CM

## (undated) DEVICE — TIDISHIELD UROLOGY DRAIN BAGS FROSTY VINYL STERILE FITS SIEMENS UROSKOP ACCESS 20 PER CASE: Brand: TIDISHIELD

## (undated) DEVICE — SYR LUERLOK 20CC BX/50

## (undated) DEVICE — NITINOL WIRE WITH HYDROPHILIC TIP: Brand: SENSOR